# Patient Record
Sex: FEMALE | Race: WHITE | NOT HISPANIC OR LATINO | Employment: FULL TIME | URBAN - METROPOLITAN AREA
[De-identification: names, ages, dates, MRNs, and addresses within clinical notes are randomized per-mention and may not be internally consistent; named-entity substitution may affect disease eponyms.]

---

## 2023-08-15 ENCOUNTER — APPOINTMENT (OUTPATIENT)
Dept: RADIOLOGY | Facility: MEDICAL CENTER | Age: 83
End: 2023-08-15
Attending: EMERGENCY MEDICINE
Payer: MEDICARE

## 2023-08-15 ENCOUNTER — HOSPITAL ENCOUNTER (OUTPATIENT)
Facility: MEDICAL CENTER | Age: 83
End: 2023-09-02
Attending: EMERGENCY MEDICINE | Admitting: STUDENT IN AN ORGANIZED HEALTH CARE EDUCATION/TRAINING PROGRAM
Payer: MEDICARE

## 2023-08-15 DIAGNOSIS — E43 SEVERE PROTEIN-CALORIE MALNUTRITION (HCC): ICD-10-CM

## 2023-08-15 DIAGNOSIS — S09.90XA CLOSED HEAD INJURY, INITIAL ENCOUNTER: ICD-10-CM

## 2023-08-15 DIAGNOSIS — N30.00 ACUTE CYSTITIS WITHOUT HEMATURIA: ICD-10-CM

## 2023-08-15 DIAGNOSIS — R11.0 CHRONIC NAUSEA: ICD-10-CM

## 2023-08-15 DIAGNOSIS — I10 PRIMARY HYPERTENSION: ICD-10-CM

## 2023-08-15 DIAGNOSIS — F32.1 CURRENT MODERATE EPISODE OF MAJOR DEPRESSIVE DISORDER WITHOUT PRIOR EPISODE (HCC): ICD-10-CM

## 2023-08-15 DIAGNOSIS — I48.0 PAROXYSMAL ATRIAL FIBRILLATION (HCC): ICD-10-CM

## 2023-08-15 DIAGNOSIS — R62.7 FAILURE TO THRIVE IN ADULT: ICD-10-CM
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
## 2023-08-15 DIAGNOSIS — F32.2 CURRENT SEVERE EPISODE OF MAJOR DEPRESSIVE DISORDER WITHOUT PSYCHOTIC FEATURES WITHOUT PRIOR EPISODE (HCC): ICD-10-CM

## 2023-08-15 DIAGNOSIS — R55 NEAR SYNCOPE: ICD-10-CM

## 2023-08-15 PROBLEM — I16.0 HYPERTENSIVE URGENCY: Status: ACTIVE | Noted: 2023-08-15

## 2023-08-15 PROBLEM — F43.9 SITUATIONAL STRESS: Status: ACTIVE | Noted: 2023-08-15

## 2023-08-15 PROBLEM — G47.33 OSA (OBSTRUCTIVE SLEEP APNEA): Status: ACTIVE | Noted: 2023-08-15

## 2023-08-15 PROBLEM — R73.9 HYPERGLYCEMIA: Status: ACTIVE | Noted: 2023-08-15

## 2023-08-15 PROBLEM — I16.0 HYPERTENSIVE URGENCY: Status: RESOLVED | Noted: 2023-08-15 | Resolved: 2023-08-15

## 2023-08-15 LAB
ALBUMIN SERPL BCP-MCNC: 4.4 G/DL (ref 3.2–4.9)
ALBUMIN/GLOB SERPL: 1.7 G/DL
ALP SERPL-CCNC: 95 U/L (ref 30–99)
ALT SERPL-CCNC: 15 U/L (ref 2–50)
ANION GAP SERPL CALC-SCNC: 10 MMOL/L (ref 7–16)
AST SERPL-CCNC: 21 U/L (ref 12–45)
BASOPHILS # BLD AUTO: 0.3 % (ref 0–1.8)
BASOPHILS # BLD: 0.03 K/UL (ref 0–0.12)
BILIRUB SERPL-MCNC: 1 MG/DL (ref 0.1–1.5)
BUN SERPL-MCNC: 24 MG/DL (ref 8–22)
CALCIUM ALBUM COR SERPL-MCNC: 9.4 MG/DL (ref 8.5–10.5)
CALCIUM SERPL-MCNC: 9.7 MG/DL (ref 8.5–10.5)
CHLORIDE SERPL-SCNC: 99 MMOL/L (ref 96–112)
CO2 SERPL-SCNC: 28 MMOL/L (ref 20–33)
CREAT SERPL-MCNC: 1.04 MG/DL (ref 0.5–1.4)
EKG IMPRESSION: NORMAL
EOSINOPHIL # BLD AUTO: 0.16 K/UL (ref 0–0.51)
EOSINOPHIL NFR BLD: 1.6 % (ref 0–6.9)
ERYTHROCYTE [DISTWIDTH] IN BLOOD BY AUTOMATED COUNT: 48.4 FL (ref 35.9–50)
GFR SERPLBLD CREATININE-BSD FMLA CKD-EPI: 53 ML/MIN/1.73 M 2
GLOBULIN SER CALC-MCNC: 2.6 G/DL (ref 1.9–3.5)
GLUCOSE SERPL-MCNC: 127 MG/DL (ref 65–99)
HCT VFR BLD AUTO: 39.9 % (ref 37–47)
HGB BLD-MCNC: 12.8 G/DL (ref 12–16)
IMM GRANULOCYTES # BLD AUTO: 0.04 K/UL (ref 0–0.11)
IMM GRANULOCYTES NFR BLD AUTO: 0.4 % (ref 0–0.9)
LYMPHOCYTES # BLD AUTO: 1.52 K/UL (ref 1–4.8)
LYMPHOCYTES NFR BLD: 15.3 % (ref 22–41)
MCH RBC QN AUTO: 31 PG (ref 27–33)
MCHC RBC AUTO-ENTMCNC: 32.1 G/DL (ref 32.2–35.5)
MCV RBC AUTO: 96.6 FL (ref 81.4–97.8)
MONOCYTES # BLD AUTO: 0.65 K/UL (ref 0–0.85)
MONOCYTES NFR BLD AUTO: 6.5 % (ref 0–13.4)
NEUTROPHILS # BLD AUTO: 7.54 K/UL (ref 1.82–7.42)
NEUTROPHILS NFR BLD: 75.9 % (ref 44–72)
NRBC # BLD AUTO: 0 K/UL
NRBC BLD-RTO: 0 /100 WBC (ref 0–0.2)
PLATELET # BLD AUTO: 259 K/UL (ref 164–446)
PMV BLD AUTO: 9.4 FL (ref 9–12.9)
POTASSIUM SERPL-SCNC: 4.8 MMOL/L (ref 3.6–5.5)
PROT SERPL-MCNC: 7 G/DL (ref 6–8.2)
RBC # BLD AUTO: 4.13 M/UL (ref 4.2–5.4)
SODIUM SERPL-SCNC: 137 MMOL/L (ref 135–145)
TROPONIN T SERPL-MCNC: 13 NG/L (ref 6–19)
WBC # BLD AUTO: 9.9 K/UL (ref 4.8–10.8)

## 2023-08-15 PROCEDURE — 36415 COLL VENOUS BLD VENIPUNCTURE: CPT

## 2023-08-15 PROCEDURE — 99285 EMERGENCY DEPT VISIT HI MDM: CPT

## 2023-08-15 PROCEDURE — G0378 HOSPITAL OBSERVATION PER HR: HCPCS

## 2023-08-15 PROCEDURE — A9270 NON-COVERED ITEM OR SERVICE: HCPCS | Performed by: NURSE PRACTITIONER

## 2023-08-15 PROCEDURE — 700111 HCHG RX REV CODE 636 W/ 250 OVERRIDE (IP): Performed by: NURSE PRACTITIONER

## 2023-08-15 PROCEDURE — 80053 COMPREHEN METABOLIC PANEL: CPT

## 2023-08-15 PROCEDURE — 93005 ELECTROCARDIOGRAM TRACING: CPT | Performed by: EMERGENCY MEDICINE

## 2023-08-15 PROCEDURE — 70450 CT HEAD/BRAIN W/O DYE: CPT

## 2023-08-15 PROCEDURE — 96372 THER/PROPH/DIAG INJ SC/IM: CPT

## 2023-08-15 PROCEDURE — 99222 1ST HOSP IP/OBS MODERATE 55: CPT | Mod: AI | Performed by: NURSE PRACTITIONER

## 2023-08-15 PROCEDURE — 71045 X-RAY EXAM CHEST 1 VIEW: CPT

## 2023-08-15 PROCEDURE — 85025 COMPLETE CBC W/AUTO DIFF WBC: CPT

## 2023-08-15 PROCEDURE — 84484 ASSAY OF TROPONIN QUANT: CPT

## 2023-08-15 PROCEDURE — 700102 HCHG RX REV CODE 250 W/ 637 OVERRIDE(OP): Performed by: NURSE PRACTITIONER

## 2023-08-15 PROCEDURE — 94760 N-INVAS EAR/PLS OXIMETRY 1: CPT

## 2023-08-15 PROCEDURE — 72125 CT NECK SPINE W/O DYE: CPT

## 2023-08-15 RX ORDER — OMEPRAZOLE 20 MG/1
20 CAPSULE, DELAYED RELEASE ORAL 2 TIMES DAILY
Status: DISCONTINUED | OUTPATIENT
Start: 2023-08-15 | End: 2023-09-02 | Stop reason: HOSPADM

## 2023-08-15 RX ORDER — POLYETHYLENE GLYCOL 3350 17 G/17G
1 POWDER, FOR SOLUTION ORAL
Status: DISCONTINUED | OUTPATIENT
Start: 2023-08-15 | End: 2023-08-19

## 2023-08-15 RX ORDER — FLUTICASONE PROPIONATE 50 MCG
1 SPRAY, SUSPENSION (ML) NASAL
Status: DISCONTINUED | OUTPATIENT
Start: 2023-08-15 | End: 2023-08-15

## 2023-08-15 RX ORDER — HYDRALAZINE HYDROCHLORIDE 20 MG/ML
10 INJECTION INTRAMUSCULAR; INTRAVENOUS EVERY 4 HOURS PRN
Status: DISCONTINUED | OUTPATIENT
Start: 2023-08-15 | End: 2023-08-26

## 2023-08-15 RX ORDER — LORAZEPAM 0.5 MG/1
0.5 TABLET ORAL 2 TIMES DAILY PRN
Status: DISCONTINUED | OUTPATIENT
Start: 2023-08-15 | End: 2023-08-26

## 2023-08-15 RX ORDER — VITAMIN B COMPLEX
1000 TABLET ORAL DAILY
Status: DISCONTINUED | OUTPATIENT
Start: 2023-08-16 | End: 2023-09-02 | Stop reason: HOSPADM

## 2023-08-15 RX ORDER — ONDANSETRON 4 MG/1
4 TABLET, ORALLY DISINTEGRATING ORAL EVERY 8 HOURS PRN
COMMUNITY

## 2023-08-15 RX ORDER — ONDANSETRON 4 MG/1
4 TABLET, ORALLY DISINTEGRATING ORAL EVERY 6 HOURS PRN
Status: DISCONTINUED | OUTPATIENT
Start: 2023-08-15 | End: 2023-09-02 | Stop reason: HOSPADM

## 2023-08-15 RX ORDER — ONDANSETRON 2 MG/ML
4 INJECTION INTRAMUSCULAR; INTRAVENOUS EVERY 6 HOURS PRN
Status: DISCONTINUED | OUTPATIENT
Start: 2023-08-15 | End: 2023-09-02 | Stop reason: HOSPADM

## 2023-08-15 RX ORDER — LOSARTAN POTASSIUM 100 MG/1
100 TABLET ORAL DAILY
Status: ON HOLD | COMMUNITY
End: 2023-08-28

## 2023-08-15 RX ORDER — HEPARIN SODIUM 5000 [USP'U]/ML
5000 INJECTION, SOLUTION INTRAVENOUS; SUBCUTANEOUS EVERY 8 HOURS
Status: DISCONTINUED | OUTPATIENT
Start: 2023-08-15 | End: 2023-08-17

## 2023-08-15 RX ORDER — LEVOTHYROXINE SODIUM 0.1 MG/1
100 TABLET ORAL
COMMUNITY

## 2023-08-15 RX ORDER — DOCUSATE SODIUM 100 MG/1
100 CAPSULE, LIQUID FILLED ORAL
Status: ON HOLD | COMMUNITY
End: 2023-08-28

## 2023-08-15 RX ORDER — PANTOPRAZOLE SODIUM 40 MG/1
40 TABLET, DELAYED RELEASE ORAL 2 TIMES DAILY
COMMUNITY

## 2023-08-15 RX ORDER — DABIGATRAN ETEXILATE MESYLATE 110 MG/1
110 CAPSULE ORAL 2 TIMES DAILY
COMMUNITY

## 2023-08-15 RX ORDER — BISACODYL 10 MG
10 SUPPOSITORY, RECTAL RECTAL
Status: DISCONTINUED | OUTPATIENT
Start: 2023-08-15 | End: 2023-09-02 | Stop reason: HOSPADM

## 2023-08-15 RX ORDER — AMOXICILLIN 250 MG
2 CAPSULE ORAL 2 TIMES DAILY PRN
Status: DISCONTINUED | OUTPATIENT
Start: 2023-08-15 | End: 2023-08-19

## 2023-08-15 RX ORDER — FLUTICASONE PROPIONATE 50 MCG
1 SPRAY, SUSPENSION (ML) NASAL
COMMUNITY

## 2023-08-15 RX ORDER — LEVOTHYROXINE SODIUM 0.1 MG/1
100 TABLET ORAL
Status: DISCONTINUED | OUTPATIENT
Start: 2023-08-16 | End: 2023-09-02 | Stop reason: HOSPADM

## 2023-08-15 RX ADMIN — HEPARIN SODIUM 5000 UNITS: 5000 INJECTION, SOLUTION INTRAVENOUS; SUBCUTANEOUS at 21:33

## 2023-08-15 RX ADMIN — OMEPRAZOLE 20 MG: 20 CAPSULE, DELAYED RELEASE ORAL at 21:33

## 2023-08-15 ASSESSMENT — COGNITIVE AND FUNCTIONAL STATUS - GENERAL
TURNING FROM BACK TO SIDE WHILE IN FLAT BAD: A LITTLE
MOVING TO AND FROM BED TO CHAIR: A LITTLE
STANDING UP FROM CHAIR USING ARMS: A LITTLE
TOILETING: A LITTLE
SUGGESTED CMS G CODE MODIFIER DAILY ACTIVITY: CJ
SUGGESTED CMS G CODE MODIFIER MOBILITY: CK
DRESSING REGULAR LOWER BODY CLOTHING: A LITTLE
DAILY ACTIVITIY SCORE: 20
CLIMB 3 TO 5 STEPS WITH RAILING: A LOT
WALKING IN HOSPITAL ROOM: A LITTLE
HELP NEEDED FOR BATHING: A LITTLE
MOVING FROM LYING ON BACK TO SITTING ON SIDE OF FLAT BED: A LOT
MOBILITY SCORE: 16
DRESSING REGULAR UPPER BODY CLOTHING: A LITTLE

## 2023-08-15 ASSESSMENT — LIFESTYLE VARIABLES
HAVE YOU EVER FELT YOU SHOULD CUT DOWN ON YOUR DRINKING: NO
EVER FELT BAD OR GUILTY ABOUT YOUR DRINKING: NO
ON A TYPICAL DAY WHEN YOU DRINK ALCOHOL HOW MANY DRINKS DO YOU HAVE: 0
TOTAL SCORE: 0
ALCOHOL_USE: NO
EVER HAD A DRINK FIRST THING IN THE MORNING TO STEADY YOUR NERVES TO GET RID OF A HANGOVER: NO
CONSUMPTION TOTAL: NEGATIVE
AVERAGE NUMBER OF DAYS PER WEEK YOU HAVE A DRINK CONTAINING ALCOHOL: 0
TOTAL SCORE: 0
TOTAL SCORE: 0
HOW MANY TIMES IN THE PAST YEAR HAVE YOU HAD 5 OR MORE DRINKS IN A DAY: 0
HAVE PEOPLE ANNOYED YOU BY CRITICIZING YOUR DRINKING: NO

## 2023-08-15 ASSESSMENT — ENCOUNTER SYMPTOMS
DIAPHORESIS: 0
SORE THROAT: 0
ORTHOPNEA: 0
FALLS: 1
BLOOD IN STOOL: 0
WEAKNESS: 0
HEADACHES: 0
FLANK PAIN: 0
SHORTNESS OF BREATH: 0
FEVER: 0
MYALGIAS: 0
HEARTBURN: 1
VOMITING: 0
WEIGHT LOSS: 1
SPUTUM PRODUCTION: 0
NAUSEA: 1
PALPITATIONS: 0
NECK PAIN: 0
EYES NEGATIVE: 1
COUGH: 0
DIZZINESS: 1
CONSTIPATION: 1
WHEEZING: 0
ABDOMINAL PAIN: 0
CHILLS: 0
NERVOUS/ANXIOUS: 1
SINUS PAIN: 0
BACK PAIN: 0
DEPRESSION: 1

## 2023-08-15 ASSESSMENT — PATIENT HEALTH QUESTIONNAIRE - PHQ9
4. FEELING TIRED OR HAVING LITTLE ENERGY: NEARLY EVERY DAY
5. POOR APPETITE OR OVEREATING: NEARLY EVERY DAY
SUM OF ALL RESPONSES TO PHQ9 QUESTIONS 1 AND 2: 6
9. THOUGHTS THAT YOU WOULD BE BETTER OFF DEAD, OR OF HURTING YOURSELF: NOT AT ALL
SUM OF ALL RESPONSES TO PHQ QUESTIONS 1-9: 19
3. TROUBLE FALLING OR STAYING ASLEEP OR SLEEPING TOO MUCH: NEARLY EVERY DAY
6. FEELING BAD ABOUT YOURSELF - OR THAT YOU ARE A FAILURE OR HAVE LET YOURSELF OR YOUR FAMILY DOWN: SEVERAL DAYS
2. FEELING DOWN, DEPRESSED, IRRITABLE, OR HOPELESS: NEARLY EVERY DAY
1. LITTLE INTEREST OR PLEASURE IN DOING THINGS: NEARLY EVERY DAY
7. TROUBLE CONCENTRATING ON THINGS, SUCH AS READING THE NEWSPAPER OR WATCHING TELEVISION: NEARLY EVERY DAY
8. MOVING OR SPEAKING SO SLOWLY THAT OTHER PEOPLE COULD HAVE NOTICED. OR THE OPPOSITE, BEING SO FIGETY OR RESTLESS THAT YOU HAVE BEEN MOVING AROUND A LOT MORE THAN USUAL: NOT AT ALL

## 2023-08-15 ASSESSMENT — FIBROSIS 4 INDEX: FIB4 SCORE: 1.76

## 2023-08-15 ASSESSMENT — PAIN DESCRIPTION - PAIN TYPE: TYPE: ACUTE PAIN

## 2023-08-15 NOTE — ED NOTES
Med Rec complete per patient and med list on phone   Allergies reviewed  No oral antibiotics in the last 30 days  Preferred pharmacy: [facility] on [address]    Pt states she took a Supplement yesterday evening that helps digest food, pt unable to verify name

## 2023-08-15 NOTE — ED NOTES
Pt reports losing ~30 lbs in the last 6 months. Pt also reports having frequent dizzy spells over the last few months.

## 2023-08-15 NOTE — ED TRIAGE NOTES
Pt bib ems c/o episode of nausea and dizziness that caused her to fall down hit her head. -loc +blood thinners. Pt states she has been having these episodes of nausea and dizziness for months. Pt states she take zofran daily for this. Nad. No obvious trauma. Denies pain. Report off to Tom SINGLETON

## 2023-08-15 NOTE — ED PROVIDER NOTES
ER Provider Note    Scribed for Jelani Oakes D.O. by Elisabet Dick. 8/15/2023  1:08 PM    Primary Care Provider: No primary care provider noted.    CHIEF COMPLAINT  Chief Complaint   Patient presents with    Fall     HPI/ROS  LIMITATION TO HISTORY   None    OUTSIDE HISTORIAN(S)  EMS who gave report    Felipa Chavez is a 82 y.o. female who presents to the Emergency Department as a code TBI. The patient was standing at the [facility] when she suddenly felt nauseous and dizzy causing her to fall on her face. No loss of consciousness and she remembers the incident. This is her third near syncopal episode in the last three months and this is the first time she is getting evaluated at the hospital. She did not lose consciousness in the previous two episodes and her family has been able to catch her before she falls to the ground. She is not on any blood thinners.     ROS as per HPI.    PAST MEDICAL HISTORY  Past Medical History:   Diagnosis Date    Chronic low back pain 11/10/2010    Esophageal stricture 11/10/2010    GERD (gastroesophageal reflux disease) 11/10/2010    HTN (hypertension) 11/10/2010    Hypothyroidism 11/10/2010       SURGICAL HISTORY  Past Surgical History:   Procedure Laterality Date    ABDOMINAL HYSTERECTOMY TOTAL      APPENDECTOMY      CHOLECYSTECTOMY      KNEE ARTHROSCOPY      LAMINOTOMY      ROTATOR CUFF REPAIR         FAMILY HISTORY  Family History   Problem Relation Age of Onset    Cancer Mother         breast    Heart Disease Father 61        mi       SOCIAL HISTORY   reports that she has never smoked. She has never used smokeless tobacco. She reports that she does not drink alcohol and does not use drugs.    CURRENT MEDICATIONS  Previous Medications    LEVOTHYROXINE (SYNTHROID) 75 MCG TABS    TAKE ONE TABLET BY MOUTH EVERY DAY    LISINOPRIL (PRINIVIL) 20 MG TABS    Take 1 Tab by mouth every day. Needs visit before next refill.    OMEPRAZOLE (PRILOSEC) 20 MG CPDR    Take 20 mg by  "mouth 2 times a day.    ONDANSETRON (ZOFRAN ODT) 4 MG TABLET DISPERSIBLE    Take 4 mg by mouth every 6 hours as needed for Nausea/Vomiting.    PROBIOTIC PRODUCT (ALIGN PO)    Take  by mouth every day.       ALLERGIES  Codeine and Pcn [penicillins]    PHYSICAL EXAM  BP (!) 178/74   Pulse 72   Temp 36.7 °C (98 °F) (Temporal)   Resp 17   Ht 1.499 m (4' 11\")   Wt 47.6 kg (105 lb)   SpO2 91%   BMI 21.21 kg/m²     General: No acute distress.  HENT: Normocephalic, Mucus membranes are moist.   Chest: Lungs have even and unlabored respirations, Clear to auscultation.   Cardiovascular: Regular rate and regular rhythm, No peripheral cyanosis.  Abdomen: Non distended.  Neuro: Awake, Conversive, Able to relay recent events.  Psychiatric: Calm and cooperative.     EXTERNAL RECORDS REVIEWED  Review of patient's past medical records show patient has a history of hypertension.     INITIAL ASSESSMENT  Patient presents as a code TBI and was seen stat at the charge desk. History was obtained by paramedics. Patient has a GCS of 15 and is oriented. There are no areas where she is complaining of pain. She will be sent stat to CT to scan her head and cervical spine to evaluate for bleed and fracture due to the mechanism of injury. She has had two episodes of near syncope like this so cardiac evaluation will be done.     ED Observation Status? No; Patient does not meet criteria for ED Observation.     DIAGNOSTIC STUDIES    Labs:   Results for orders placed or performed during the hospital encounter of 08/15/23   CBC WITH DIFFERENTIAL   Result Value Ref Range    WBC 9.9 4.8 - 10.8 K/uL    RBC 4.13 (L) 4.20 - 5.40 M/uL    Hemoglobin 12.8 12.0 - 16.0 g/dL    Hematocrit 39.9 37.0 - 47.0 %    MCV 96.6 81.4 - 97.8 fL    MCH 31.0 27.0 - 33.0 pg    MCHC 32.1 (L) 32.2 - 35.5 g/dL    RDW 48.4 35.9 - 50.0 fL    Platelet Count 259 164 - 446 K/uL    MPV 9.4 9.0 - 12.9 fL    Neutrophils-Polys 75.90 (H) 44.00 - 72.00 %    Lymphocytes 15.30 (L) " 22.00 - 41.00 %    Monocytes 6.50 0.00 - 13.40 %    Eosinophils 1.60 0.00 - 6.90 %    Basophils 0.30 0.00 - 1.80 %    Immature Granulocytes 0.40 0.00 - 0.90 %    Nucleated RBC 0.00 0.00 - 0.20 /100 WBC    Neutrophils (Absolute) 7.54 (H) 1.82 - 7.42 K/uL    Lymphs (Absolute) 1.52 1.00 - 4.80 K/uL    Monos (Absolute) 0.65 0.00 - 0.85 K/uL    Eos (Absolute) 0.16 0.00 - 0.51 K/uL    Baso (Absolute) 0.03 0.00 - 0.12 K/uL    Immature Granulocytes (abs) 0.04 0.00 - 0.11 K/uL    NRBC (Absolute) 0.00 K/uL   COMP METABOLIC PANEL   Result Value Ref Range    Sodium 137 135 - 145 mmol/L    Potassium 4.8 3.6 - 5.5 mmol/L    Chloride 99 96 - 112 mmol/L    Co2 28 20 - 33 mmol/L    Anion Gap 10.0 7.0 - 16.0    Glucose 127 (H) 65 - 99 mg/dL    Bun 24 (H) 8 - 22 mg/dL    Creatinine 1.04 0.50 - 1.40 mg/dL    Calcium 9.7 8.5 - 10.5 mg/dL    Correct Calcium 9.4 8.5 - 10.5 mg/dL    AST(SGOT) 21 12 - 45 U/L    ALT(SGPT) 15 2 - 50 U/L    Alkaline Phosphatase 95 30 - 99 U/L    Total Bilirubin 1.0 0.1 - 1.5 mg/dL    Albumin 4.4 3.2 - 4.9 g/dL    Total Protein 7.0 6.0 - 8.2 g/dL    Globulin 2.6 1.9 - 3.5 g/dL    A-G Ratio 1.7 g/dL   TROPONIN   Result Value Ref Range    Troponin T 13 6 - 19 ng/L   ESTIMATED GFR   Result Value Ref Range    GFR (CKD-EPI) 53 (A) >60 mL/min/1.73 m 2   EKG (NOW)   Result Value Ref Range    Report       [facility] Emergency Dept.    Test Date:  2023-08-15  Pt Name:    TEREZA CLAIRE                   Department: ER  MRN:        [id number]                      Room:       BL 22  Gender:     Female                       Technician: [id number]  :        [id number]                   Requested By:MICHELE LEAVITT  Order #:    [id number]                    Reading MD: MICHELE LEAVITT D.O.    Measurements  Intervals                                Axis  Rate:       67                           P:          52  CA:         201                          QRS:        -16  QRSD:       96                           T:           41  QT:         377  QTc:        398    Interpretive Statements  Sinus rhythm  Borderline left axis deviation  No previous ECG available for comparison  Electronically Signed On 08- 15:51:51 PDT by MICHELE LEAVITT D.O.        All labs reviewed by me.      EKG:   I have independently interpreted the above EKG.    Radiology:   The attending emergency physician has independently interpreted the diagnostic imaging associated with this visit and am waiting the final reading from the radiologist.   Preliminary interpretation is as follows: CT-Head shows no bleed.   Radiologist interpretation:   DX-CHEST-PORTABLE (1 VIEW)   Final Result      No acute cardiopulmonary abnormality.      CT-CSPINE WITHOUT PLUS RECONS   Final Result      No acute fracture or dislocation of the cervical spine.      Multilevel disc and facet degeneration.      CT-HEAD W/O   Final Result      1.  Chronic microvascular ischemic type changes.   2.  No acute intracranial abnormality.   3.  Right sphenoid sinusitis         EC-ECHOCARDIOGRAM COMPLETE W/O CONT    (Results Pending)      COURSE & MEDICAL DECISION MAKING     COURSE AND PLAN  1:08 PM - Patient seen and examined at the charge desk as a Code TBI. Discussed plan of care, including imaging and labs. Patient agrees to the plan of care. Ordered for EKG, CT-Head without contrast, CT-C Spine without contrast, DX-Chest, Troponin, CMP, Estimated GFR, and CBC w/ Diff to evaluate her symptoms.     3:40 PM - Patient was reevaluated at bedside. Discussed lab and radiology results with the patient. The patient had the opportunity to ask any questions. The plan for hospitalization was discussed with the patient given their current presentation and diagnostic study results. Patient is understanding and agreeable to the plan for hospitalization.     4:24 PM - I discussed the patient's case and the above findings with Dr. Campbell (Hospitalist) who agrees to evaluate the patient for  hospitalization.    ED Summary: Patient presented as code TBI.  She had a syncope versus near syncopal episode.  The patient had head and C-spine which was negative.  A cardiac evaluation was done to evaluate for her syncopal episodes.  In discussing this with patient and the daughter further, she been having increased episodes of this.  Happens at rest, happens with mild exertion, not specifically from orthostatic changes.  With this I see there is no cardiac evaluation having done in the chart.  The patient will be admitted for further evaluation for syncope versus near syncope.    CRITICAL CARE  The very real possibilty of a deterioration of this patient's condition required the highest level of my preparedness for sudden, emergent intervention. I provided critical care services, which included medication orders, frequent reevaluations of the patient's condition and response to treatment, ordering and reviewing test results, and discussing the case with various consultants. The critical care time associated with the care of the patient was 35 minutes. Review chart for interventions. This time is exclusive of any other billable procedures.      DISPOSITION AND DISCUSSIONS  I have discussed management of the patient with the following physicians and SHAYLA's: Dr. Campbell (Hospitalist).     Discussion of management with other [facility] or appropriate source(s): None.     Barriers to care at this time, including but not limited to: None.     The patient will return for new or worsening symptoms and is stable at the time of discharge.    The patient is referred to a primary physician for blood pressure management, diabetic screening, and for all other preventative health concerns.    DISPOSITION:  Patient will be hospitalized by Dr. Campbell in guarded condition.     FINAL DIAGNOSIS  1. Fall, initial encounter    2. Closed head injury, initial encounter    3. Near syncope      Total Critical Care Time was 35 minutes, as outlined  above.     Elisabet VILLA (Scribe), am scribing for, and in the presence of, Jelani Oakes D.O..    Electronically signed by: Elisabet Dick (Garretibjanice), 8/15/2023    Jelani VILLA D.O. personally performed the services described in this documentation, as scribed by Elisabet Dick in my presence, and it is both accurate and complete.     The note accurately reflects work and decisions made by me.  Jelani Oakes D.O.  8/15/2023  7:12 PM

## 2023-08-16 ENCOUNTER — APPOINTMENT (OUTPATIENT)
Dept: CARDIOLOGY | Facility: MEDICAL CENTER | Age: 83
End: 2023-08-16
Attending: NURSE PRACTITIONER
Payer: MEDICARE

## 2023-08-16 LAB
ANION GAP SERPL CALC-SCNC: 9 MMOL/L (ref 7–16)
BASOPHILS # BLD AUTO: 0.5 % (ref 0–1.8)
BASOPHILS # BLD: 0.04 K/UL (ref 0–0.12)
BUN SERPL-MCNC: 27 MG/DL (ref 8–22)
CALCIUM SERPL-MCNC: 8.8 MG/DL (ref 8.5–10.5)
CHLORIDE SERPL-SCNC: 100 MMOL/L (ref 96–112)
CO2 SERPL-SCNC: 27 MMOL/L (ref 20–33)
CREAT SERPL-MCNC: 1.11 MG/DL (ref 0.5–1.4)
EOSINOPHIL # BLD AUTO: 0.18 K/UL (ref 0–0.51)
EOSINOPHIL NFR BLD: 2.1 % (ref 0–6.9)
ERYTHROCYTE [DISTWIDTH] IN BLOOD BY AUTOMATED COUNT: 48.7 FL (ref 35.9–50)
EST. AVERAGE GLUCOSE BLD GHB EST-MCNC: 123 MG/DL
GFR SERPLBLD CREATININE-BSD FMLA CKD-EPI: 49 ML/MIN/1.73 M 2
GLUCOSE SERPL-MCNC: 97 MG/DL (ref 65–99)
HBA1C MFR BLD: 5.9 % (ref 4–5.6)
HCT VFR BLD AUTO: 37.6 % (ref 37–47)
HGB BLD-MCNC: 12 G/DL (ref 12–16)
IMM GRANULOCYTES # BLD AUTO: 0.01 K/UL (ref 0–0.11)
IMM GRANULOCYTES NFR BLD AUTO: 0.1 % (ref 0–0.9)
LV EJECT FRACT  99904: 65
LV EJECT FRACT MOD 2C 99903: 62.52
LV EJECT FRACT MOD 4C 99902: 66.72
LV EJECT FRACT MOD BP 99901: 64.93
LYMPHOCYTES # BLD AUTO: 3.05 K/UL (ref 1–4.8)
LYMPHOCYTES NFR BLD: 35.1 % (ref 22–41)
MAGNESIUM SERPL-MCNC: 2 MG/DL (ref 1.5–2.5)
MCH RBC QN AUTO: 30.8 PG (ref 27–33)
MCHC RBC AUTO-ENTMCNC: 31.9 G/DL (ref 32.2–35.5)
MCV RBC AUTO: 96.7 FL (ref 81.4–97.8)
MONOCYTES # BLD AUTO: 1.17 K/UL (ref 0–0.85)
MONOCYTES NFR BLD AUTO: 13.5 % (ref 0–13.4)
NEUTROPHILS # BLD AUTO: 4.24 K/UL (ref 1.82–7.42)
NEUTROPHILS NFR BLD: 48.7 % (ref 44–72)
NRBC # BLD AUTO: 0 K/UL
NRBC BLD-RTO: 0 /100 WBC (ref 0–0.2)
PLATELET # BLD AUTO: 239 K/UL (ref 164–446)
PMV BLD AUTO: 9.6 FL (ref 9–12.9)
POTASSIUM SERPL-SCNC: 4.8 MMOL/L (ref 3.6–5.5)
RBC # BLD AUTO: 3.89 M/UL (ref 4.2–5.4)
SODIUM SERPL-SCNC: 136 MMOL/L (ref 135–145)
TSH SERPL DL<=0.005 MIU/L-ACNC: 1.07 UIU/ML (ref 0.38–5.33)
VIT B12 SERPL-MCNC: 1038 PG/ML (ref 211–911)
WBC # BLD AUTO: 8.7 K/UL (ref 4.8–10.8)

## 2023-08-16 PROCEDURE — G0378 HOSPITAL OBSERVATION PER HR: HCPCS

## 2023-08-16 PROCEDURE — 83036 HEMOGLOBIN GLYCOSYLATED A1C: CPT

## 2023-08-16 PROCEDURE — A9270 NON-COVERED ITEM OR SERVICE: HCPCS | Performed by: NURSE PRACTITIONER

## 2023-08-16 PROCEDURE — 36415 COLL VENOUS BLD VENIPUNCTURE: CPT

## 2023-08-16 PROCEDURE — 93306 TTE W/DOPPLER COMPLETE: CPT | Mod: 26 | Performed by: INTERNAL MEDICINE

## 2023-08-16 PROCEDURE — A9270 NON-COVERED ITEM OR SERVICE: HCPCS | Performed by: INTERNAL MEDICINE

## 2023-08-16 PROCEDURE — 96372 THER/PROPH/DIAG INJ SC/IM: CPT

## 2023-08-16 PROCEDURE — 83735 ASSAY OF MAGNESIUM: CPT

## 2023-08-16 PROCEDURE — 82652 VIT D 1 25-DIHYDROXY: CPT

## 2023-08-16 PROCEDURE — 700102 HCHG RX REV CODE 250 W/ 637 OVERRIDE(OP): Performed by: INTERNAL MEDICINE

## 2023-08-16 PROCEDURE — 700111 HCHG RX REV CODE 636 W/ 250 OVERRIDE (IP): Performed by: NURSE PRACTITIONER

## 2023-08-16 PROCEDURE — 82607 VITAMIN B-12: CPT

## 2023-08-16 PROCEDURE — 700102 HCHG RX REV CODE 250 W/ 637 OVERRIDE(OP): Performed by: NURSE PRACTITIONER

## 2023-08-16 PROCEDURE — 80048 BASIC METABOLIC PNL TOTAL CA: CPT

## 2023-08-16 PROCEDURE — 99232 SBSQ HOSP IP/OBS MODERATE 35: CPT | Performed by: INTERNAL MEDICINE

## 2023-08-16 PROCEDURE — 84443 ASSAY THYROID STIM HORMONE: CPT

## 2023-08-16 PROCEDURE — 85025 COMPLETE CBC W/AUTO DIFF WBC: CPT

## 2023-08-16 PROCEDURE — 93306 TTE W/DOPPLER COMPLETE: CPT

## 2023-08-16 RX ORDER — AMLODIPINE BESYLATE 5 MG/1
2.5 TABLET ORAL
Status: DISCONTINUED | OUTPATIENT
Start: 2023-08-16 | End: 2023-08-17

## 2023-08-16 RX ORDER — PROCHLORPERAZINE EDISYLATE 5 MG/ML
10 INJECTION INTRAMUSCULAR; INTRAVENOUS EVERY 6 HOURS PRN
Status: DISCONTINUED | OUTPATIENT
Start: 2023-08-16 | End: 2023-08-26

## 2023-08-16 RX ORDER — ACETAMINOPHEN 325 MG/1
650 TABLET ORAL EVERY 6 HOURS PRN
Status: DISCONTINUED | OUTPATIENT
Start: 2023-08-16 | End: 2023-08-26

## 2023-08-16 RX ADMIN — OMEPRAZOLE 20 MG: 20 CAPSULE, DELAYED RELEASE ORAL at 05:26

## 2023-08-16 RX ADMIN — HEPARIN SODIUM 5000 UNITS: 5000 INJECTION, SOLUTION INTRAVENOUS; SUBCUTANEOUS at 22:00

## 2023-08-16 RX ADMIN — AMLODIPINE BESYLATE 2.5 MG: 5 TABLET ORAL at 15:00

## 2023-08-16 RX ADMIN — ONDANSETRON 4 MG: 4 TABLET, ORALLY DISINTEGRATING ORAL at 05:24

## 2023-08-16 RX ADMIN — LEVOTHYROXINE SODIUM 100 MCG: 0.1 TABLET ORAL at 05:27

## 2023-08-16 RX ADMIN — HEPARIN SODIUM 5000 UNITS: 5000 INJECTION, SOLUTION INTRAVENOUS; SUBCUTANEOUS at 15:00

## 2023-08-16 RX ADMIN — ACETAMINOPHEN 650 MG: 325 TABLET, FILM COATED ORAL at 23:42

## 2023-08-16 RX ADMIN — HEPARIN SODIUM 5000 UNITS: 5000 INJECTION, SOLUTION INTRAVENOUS; SUBCUTANEOUS at 05:25

## 2023-08-16 RX ADMIN — OMEPRAZOLE 20 MG: 20 CAPSULE, DELAYED RELEASE ORAL at 17:08

## 2023-08-16 RX ADMIN — ACETAMINOPHEN 650 MG: 325 TABLET, FILM COATED ORAL at 17:08

## 2023-08-16 RX ADMIN — SENNOSIDES AND DOCUSATE SODIUM 2 TABLET: 50; 8.6 TABLET ORAL at 05:26

## 2023-08-16 RX ADMIN — Medication 1000 UNITS: at 05:26

## 2023-08-16 ASSESSMENT — ENCOUNTER SYMPTOMS
SINUS PAIN: 0
SHORTNESS OF BREATH: 0
NECK PAIN: 0
EYES NEGATIVE: 1
HEADACHES: 0
SORE THROAT: 0
VOMITING: 0
WEAKNESS: 0
WHEEZING: 0
WEIGHT LOSS: 1
FEVER: 0
DEPRESSION: 1
DIAPHORESIS: 0
HEARTBURN: 1
PALPITATIONS: 0
NERVOUS/ANXIOUS: 1
NAUSEA: 1
MYALGIAS: 0
FLANK PAIN: 0
BACK PAIN: 0
BLOOD IN STOOL: 0
COUGH: 0
ORTHOPNEA: 0
CONSTIPATION: 1
DIZZINESS: 1
FALLS: 1
SPUTUM PRODUCTION: 0
ABDOMINAL PAIN: 0
CHILLS: 0

## 2023-08-16 ASSESSMENT — PAIN DESCRIPTION - PAIN TYPE: TYPE: ACUTE PAIN

## 2023-08-16 ASSESSMENT — FIBROSIS 4 INDEX: FIB4 SCORE: 1.91

## 2023-08-16 NOTE — ASSESSMENT & PLAN NOTE
Per patient report and security footage at bank she did not lose consciousness  Recurrent near-syncope events precipitated by nausea  Likely vasovagal due to chronic constipation and nausea  CTH negative for acute process  TTE without significant valvular disease  EKG and telemetry NSR without Afib RVR

## 2023-08-16 NOTE — DIETARY
"Nutrition services: Day 0 of admit.  Felipa Chavez is a 82 y.o. female with admitting DX of syncope and collapse    Consult received for MST 4 on nutrition screening. RD attempted to meet with pt at bedside but pt was on the phone. RD communicated with RN. Per RN, pt has been eating okay and no nutrition concerns that RN is aware of.     Assessment:  Height: 149.9 cm (4' 11\")  Weight: 45.8 kg (100 lb 15.5 oz)- stand up scale  Body mass index is 20.39 kg/m²., BMI classification: normal weight  Diet/Intake: Cardiac Diet. PO intake >50% x2 meals per ADL's    Evaluation:   Pt admitted for syncope and collapse with stress, hyperglycemia, JEREMIAH, chronic nausea, HTN, GERD, hypothyroidism  Based on weight history in chart, no other weight history to review in the past 1 year.   Labs: Bun 27, GFR 49, A1C 5.9  Meds: Synthroid, Ativan, omeprazole, Zofran, Compazine, bowel regimen, vitamin D3  Skin: No staged wounds. 1+ edema RLE and LLE  GI: Last BM PTA    Malnutrition Risk: Unable to fully assess at this time,     Recommendations/Plan:  Continue current PO diet   Encourage intake of meals  Document intake of all meals as % taken in ADL's to provide interdisciplinary communication across all shifts.   Monitor weight.  Nutrition rep will continue to see patient for ongoing meal and snack preferences.     RD following.     "

## 2023-08-16 NOTE — CARE PLAN
The patient is Stable - Low risk of patient condition declining or worsening    Shift Goals  Clinical Goals: tele monitor, promote sleep, fall prevention  Patient Goals: Sleep    Progress made toward(s) clinical / shift goals:  ortho Bps      Problem: Knowledge Deficit - Standard  Goal: Patient and family/care givers will demonstrate understanding of plan of care, disease process/condition, diagnostic tests and medications  Outcome: Progressing     Problem: Fall Risk  Goal: Patient will remain free from falls  Outcome: Progressing     Problem: Depression  Goal: Patient and family/caregiver will verbalize accurate information about at least two of the possible causes of depression, three-four of the signs and symptoms of depression  Outcome: Progressing       Patient is not progressing towards the following goals:

## 2023-08-16 NOTE — CARE PLAN
The patient is Stable - Low risk of patient condition declining or worsening    Shift Goals  Clinical Goals: tele monitor, promote sleep, fall prevention  Patient Goals: Sleep    Progress made toward(s) clinical / shift goals:    Problem: Knowledge Deficit - Standard  Goal: Patient and family/care givers will demonstrate understanding of plan of care, disease process/condition, diagnostic tests and medications  Outcome: Progressing     Problem: Fall Risk  Goal: Patient will remain free from falls  Outcome: Progressing     Problem: Depression  Goal: Patient and family/caregiver will verbalize accurate information about at least two of the possible causes of depression, three-four of the signs and symptoms of depression  Outcome: Progressing       Patient is not progressing towards the following goals:

## 2023-08-16 NOTE — DISCHARGE PLANNING
In the case of an emergency, pt's legal NOK are her 6 children, but she provided the information for her granddaughter Airam [phone].     RN NAVI met with Felipa at bedside and obtained the information used in this assessment. She currently lives in a home with her granddaughter, but plans to return to [facility] soon. She sold her home approx 4 years ago and was moving around with her . She was living with her sister for close to 3 years in [place], then spent 3-4 months with a grandson in [place] where her  passed away, then a few months back in [place], prior to coming here to live with her granddaughter about a month ago. Prior to current hospitalization, pt was independent with ADLS/IADLS. Pt drives and is able to attend necessary MD appointments. She does not have a PCP given her frequent moving. She plans to return to [place] after this admission. She has a place to stay in [place] and her friend Erik from [place] will drive with her in her camper back to [facility]. Felipa denies any needs/questions at this time.      Care Transition Team Assessment    Information Source  Orientation Level: Oriented X4  Information Given By: Patient  Who is responsible for making decisions for patient? : Patient    Readmission Evaluation  Is this a readmission?: No    Elopement Risk  Legal Hold: No  Ambulatory or Self Mobile in Wheelchair: Yes  Disoriented: No  Psychiatric Symptoms: None  History of Wandering: No  Elopement this Admit: No  Vocalizing Wanting to Leave: No  Displays Behaviors, Body Language Wanting to Leave: No-Not at Risk for Elopement  Elopement Risk: Not at Risk for Elopement    Interdisciplinary Discharge Planning  Does Admitting Nurse Feel This Could be a Complex Discharge?: No  Primary Care Physician: n/a  Lives with - Patient's Self Care Capacity: Related Adult (grand daughter, Airam)  Patient or legal guardian wants to designate a caregiver: No  Support Systems: Children, Family Member(s)  Housing /  Facility: [address] [place] [address]  Do You Take your Prescribed Medications Regularly: Yes  Able to Return to Previous ADL's: Yes  Mobility Issues: No  Prior Services: None  Patient Prefers to be Discharged to:: home  Assistance Needed: No  Durable Medical Equipment: Not Applicable    Discharge Preparedness  What is your plan after discharge?: Home with help  What are your discharge supports?: Child  Prior Functional Level: Ambulatory, Drives Self, Independent with Activities of Daily Living, Independent with Medication Management    Functional Assesment  Prior Functional Level: Ambulatory, Drives Self, Independent with Activities of Daily Living, Independent with Medication Management    Finances  Financial Barriers to Discharge: No  Prescription Coverage: Yes    Vision / Hearing Impairment  Vision Impairment : Yes  Right Eye Vision: Impaired, Wears Glasses  Left Eye Vision: Impaired, Wears Glasses  Hearing Impairment : No    Values / Beliefs / Concerns  Values / Beliefs Concerns : No    Advance Directive  Advance Directive?: None  Advance Directive offered?: AD Booklet refused    Domestic Abuse  Have you ever been the victim of abuse or violence?: No  Physical Abuse or Sexual Abuse: No  Verbal Abuse or Emotional Abuse: No  Possible Abuse/Neglect Reported to:: Not Applicable    Psychological Assessment  History of Substance Abuse: None  History of Psychiatric Problems: No  Non-compliant with Treatment: No    Discharge Risks or Barriers  Discharge risks or barriers?: No PCP    Anticipated Discharge Information  Discharge Disposition: Discharged to home/self care (01)

## 2023-08-16 NOTE — PROGRESS NOTES
[facility] Medicine Daily Progress Note    Date of Service  2023    Chief Complaint  Felipa Chavez is a 82 y.o. female admitted 8/15/2023 with fall     Hospital Course  This is a 81 y/o F with PMHX of hypothyroidism, hypertension, CAD, JEREMIAH with intermittent use of CPAP and depression who presented to the ED on 8/15/2023 after having an episode of syncope and collapse.  she was [place] the bank, she suddenly felt nauseated and dizzy and then passed out.  She recalls lying on the floor feeling confused, unclear what happened. She denies experiencing anything other than dizziness or nausea prior to falling, denies lightheadedness, chest pain, palpitations, shortness of breath, or diaphoresis  She reports significant stress since her   recently and has lost 30 pounds over the last 6-month since he passed away.  She has since had to live with different daughters and grandkids, but this has not been working out, and she has been significantly depressed over feeling like she has no place to live. She has not seen a provider since last February or March when she lived in [place].  She is currently living locally with her granddaughter, but home life is incredibly stressful for her.  She reports of being compliant with her medications.    CT head  obtained in the ED is negative for any acute findings, but shows right sphenoid sinusitis.  CT of her cervical spine is negative for acute fracture or dislocation, but does show multilevel disc and facet degeneration.  Chest x-ray is negative  Patient admitted for further work up and treatment     Interval Problem Update  Patient seen and examind, afebrile, still feels weak and tired, has some nausea , denies chest pain  BP still not well controlled.adding amlodipine  Echo pending    I have discussed this patient's plan of care and discharge plan at IDT rounds today with Case Management, Nursing, Nursing leadership, and other members of the IDT  team.    Consultants/Specialty  None     Code Status  Full Code    Disposition  The patient is not medically cleared for discharge to home or a post-acute facility.      I have placed the appropriate orders for post-discharge needs.    Review of Systems  Review of Systems   Constitutional:  Positive for malaise/fatigue and weight loss. Negative for chills, diaphoresis and fever.   HENT:  Negative for congestion, ear pain, sinus pain and sore throat.    Eyes: Negative.    Respiratory:  Negative for cough, sputum production, shortness of breath and wheezing.    Cardiovascular:  Positive for leg swelling (Reports intermittent leg swelling that resolves after she lays down for a while). Negative for chest pain, palpitations and orthopnea.   Gastrointestinal:  Positive for constipation (Chronic), heartburn (Chronic) and nausea. Negative for abdominal pain, blood in stool and vomiting.   Genitourinary:  Negative for dysuria, flank pain, frequency, hematuria and urgency.   Musculoskeletal:  Positive for falls (Positive for syncope and collapse). Negative for back pain, myalgias and neck pain.   Skin:  Negative for itching and rash.   Neurological:  Positive for dizziness. Negative for weakness and headaches.   Psychiatric/Behavioral:  Positive for depression. The patient is nervous/anxious.    All other systems reviewed and are negative.       Physical Exam  Temp:  [36 °C (96.8 °F)-37.1 °C (98.8 °F)] 36 °C (96.8 °F)  Pulse:  [67-86] 74  Resp:  [18-25] 18  BP: (110-213)/(56-82) 144/56  SpO2:  [88 %-99 %] 94 %    Physical Exam  Vitals and nursing note reviewed.   Constitutional:       General: She is not in acute distress.     Appearance: She is not ill-appearing.      Comments: Frail   HENT:      Head: Normocephalic and atraumatic.      Nose: Nose normal. No congestion or rhinorrhea.      Mouth/Throat:      Mouth: Mucous membranes are dry.      Pharynx: Oropharynx is clear.   Eyes:      Extraocular Movements: Extraocular  movements intact.      Pupils: Pupils are equal, round, and reactive to light.   Cardiovascular:      Rate and Rhythm: Normal rate and regular rhythm.      Pulses: Normal pulses.      Heart sounds: No murmur heard.     No friction rub. No gallop.   Pulmonary:      Effort: Pulmonary effort is normal. No respiratory distress.      Breath sounds: No wheezing, rhonchi or rales.   Abdominal:      General: Abdomen is flat. There is no distension.      Palpations: Abdomen is soft.      Tenderness: There is no abdominal tenderness. There is no guarding or rebound.   Musculoskeletal:         General: Normal range of motion.      Cervical back: Normal range of motion.      Right lower leg: No edema.      Left lower leg: No edema.   Skin:     General: Skin is warm and dry.      Capillary Refill: Capillary refill takes less than 2 seconds.      Coloration: Skin is not jaundiced.      Findings: No bruising or lesion.   Neurological:      Mental Status: She is alert and oriented to person, place, and time.   Psychiatric:         Mood and Affect: Mood normal.         Behavior: Behavior normal.         Fluids    Intake/Output Summary (Last 24 hours) at 8/16/2023 1416  Last data filed at 8/16/2023 0501  Gross per 24 hour   Intake --   Output 150 ml   Net -150 ml       Laboratory  Recent Labs     08/15/23  1345 08/16/23  0206   WBC 9.9 8.7   RBC 4.13* 3.89*   HEMOGLOBIN 12.8 12.0   HEMATOCRIT 39.9 37.6   MCV 96.6 96.7   MCH 31.0 30.8   MCHC 32.1* 31.9*   RDW 48.4 48.7   PLATELETCT 259 239   MPV 9.4 9.6     Recent Labs     08/15/23  1345 08/16/23  0206   SODIUM 137 136   POTASSIUM 4.8 4.8   CHLORIDE 99 100   CO2 28 27   GLUCOSE 127* 97   BUN 24* 27*   CREATININE 1.04 1.11   CALCIUM 9.7 8.8                   Imaging  DX-CHEST-PORTABLE (1 VIEW)   Final Result      No acute cardiopulmonary abnormality.      CT-CSPINE WITHOUT PLUS RECONS   Final Result      No acute fracture or dislocation of the cervical spine.      Multilevel disc and  facet degeneration.      CT-HEAD W/O   Final Result      1.  Chronic microvascular ischemic type changes.   2.  No acute intracranial abnormality.   3.  Right sphenoid sinusitis         EC-ECHOCARDIOGRAM COMPLETE W/O CONT    (Results Pending)        Assessment/Plan  * Syncope and collapse- (present on admission)  Assessment & Plan  Episode of syncope and collapse while at the bank today.  2-month history of near syncope.  All episodes associated with nausea  -continue close monitoring on telemetry for any arrhythmias and/or decompensation  Echo has been ordered and pending     JEREMIAH (obstructive sleep apnea)- (present on admission)  Assessment & Plan  Noncompliant with CPAP, as she she says she is unable to sleep with the mask over her face.  She reports waking with extreme fatigue and is tired throughout the day.  Prior to the CPAP, she was using oxygen at night.  Request not to use CPAP here as well  - Supplemental oxygen 2 L at at bedtime  - Encourage follow-up for CPAP adjustment    Hyperglycemia- (present on admission)  Assessment & Plan  Blood sugar mildly elevated.  Reports intermittent peripheral neuropathy, which she was suffering from during my exam  -A1c    Situational stress- (present on admission)  Assessment & Plan  Patient's   recently, and she has not really had a place to live since, and has been living with various daughters and grandchildren.  She has had weight loss of about 30 pounds over the last 6 months, has had months of nausea and now with syncope.  Stress may be contributing to her current symptoms.  -Patient would benefit from case management intervention  -Ativan p.o. as needed     Hypothyroidism- (present on admission)  Assessment & Plan  - Continue Synthroid  -TSH levl    GERD (gastroesophageal reflux disease)- (present on admission)  Assessment & Plan  - Continue Protonix    HTN (hypertension)- (present on admission)  Assessment & Plan  BP still not well controlled SBP  140-150  adding amlodipine to her regimen   Close monitoring and adjust as needed           VTE prophylaxis:    heparin ppx      I have performed a physical exam and reviewed and updated ROS and Plan today (8/16/2023). In review of yesterday's note (8/15/2023), there are no changes except as documented above.

## 2023-08-16 NOTE — H&P
Hospital Medicine History & Physical Note    Date of Service  8/15/2023    Primary Care Physician  Pcp Not In Computer        Code Status  Full Code    Chief Complaint  Chief Complaint   Patient presents with    Fall       History of Presenting Illness  Felipa Chavez is a 82 y.o. female with a past medical history of hypothyroidism, hypertension, CAD, JEREMIAH with intermittent use of CPAP and depression who presented to the ED on 8/15/2023 after having an episode of syncope and collapse.  The patient tells me that while she was at the bank, she suddenly felt nauseated and dizzy and then passed out.  She recalls lying on the floor feeling confused, unclear what happened. She denies experiencing anything other than dizziness or nausea prior to falling, denies lightheadedness, chest pain, palpitations, shortness of breath, or diaphoresis. She reports a history of intermittent nausea over the last couple years that she has had multiple evaluations for, including EGD and colonoscopy. She rarely has vomiting associated with this. Over the last couple months, her nausea has been associated with dizziness and, most recently, syncope. Last month, she had 2 episodes of nausea and dizziness and near syncope, both episodes within a week of part. The last episode, she was leaning on a shopping cart while at [facility] when she suddenly had nausea and dizziness and feeling like she was going to pass out.  She was able to get assistance from another customer in the store, and did not pass out or fall on that occasion.  She denies recent cold or flulike symptoms.  She has not been eating well, has chronic constipation.  She denies noting blood in her stool.  She denies dysuria.  She has been told in the past that she has had an irregular heart beat. She reports significant stress since her   recently and has lost 30 pounds over the last 6-month since he passed away.  She has since had to live with different daughters and  grandkids, but this has not been working out, and she has been significantly depressed over feeling like she has no place to live. She has not seen a provider since last February or March when she lived in [place].  She is currently living locally with her granddaughter, but home life is incredibly stressful for her.  She reports of being compliant with her medications.  However, she is supposed to use a CPAP at night for her sleep apnea, but feels she is unable to get any sleep with this.  She wakes feeling very tired and fatigued.  She has not seen a provider to adjust any of her settings or follow-up since last November.    Head CT obtained in the ED is negative for any acute findings, but shows right sphenoid sinusitis.  CT of her cervical spine is negative for acute fracture or dislocation, but does show multilevel disc and facet degeneration.  Chest x-ray is negative.  CBC is unremarkable.  CMP shows glucose 127, BUN 24, eGFR 53.  Troponin is normal.  EKG is sinus rhythm, 67 bpm, no acute ST-T changes or arrhythmias.    I discussed the plan of care with patient.    Review of Systems  Review of Systems   Constitutional:  Positive for malaise/fatigue and weight loss. Negative for chills, diaphoresis and fever.   HENT:  Negative for congestion, ear pain, sinus pain and sore throat.    Eyes: Negative.    Respiratory:  Negative for cough, sputum production, shortness of breath and wheezing.    Cardiovascular:  Positive for leg swelling (Reports intermittent leg swelling that resolves after she lays down for a while). Negative for chest pain, palpitations and orthopnea.   Gastrointestinal:  Positive for constipation (Chronic), heartburn (Chronic) and nausea. Negative for abdominal pain, blood in stool and vomiting.   Genitourinary:  Negative for dysuria, flank pain, frequency, hematuria and urgency.   Musculoskeletal:  Positive for falls (Positive for syncope and collapse). Negative for back pain, myalgias and  neck pain.   Skin:  Negative for itching and rash.   Neurological:  Positive for dizziness. Negative for weakness and headaches.   Psychiatric/Behavioral:  Positive for depression. The patient is nervous/anxious.        Past Medical History   has a past medical history of Chronic low back pain (11/10/2010), Esophageal stricture (11/10/2010), GERD (gastroesophageal reflux disease) (11/10/2010), HTN (hypertension) (11/10/2010), and Hypothyroidism (11/10/2010).  JEREMIAH, Depression    Surgical History   has a past surgical history that includes laminotomy; knee arthroscopy; abdominal hysterectomy total; rotator cuff repair; cholecystectomy; and appendectomy.     Family History  family history includes Cancer in her mother; Heart Disease (age of onset: 61) in her father.   Family history reviewed with patient. There is no family history that is pertinent to the chief complaint.     Social History   reports that she has never smoked. She has never used smokeless tobacco. She reports that she does not drink alcohol and does not use drugs.    Allergies  Allergies   Allergen Reactions    Codeine     Pcn [Penicillins]      As a child       Medications  Prior to Admission Medications   Prescriptions Last Dose Informant Patient Reported? Taking?   Cholecalciferol (VITAMIN D3 PO) 8/14/2023 at 1500 Patient Yes Yes   Sig: Take 1 Tablet by mouth every day.   Cyanocobalamin (VITAMIN B 12 PO) 8/14/2023 at 1500 Patient Yes Yes   Sig: Take 1 Tablet by mouth every day.   Dabigatran Etexilate Mesylate (PRADAXA) 110 MG Cap 8/15/2023 at AM Patient Yes Yes   Sig: Take 110 mg by mouth 2 times a day.   MAGNESIUM PO 8/14/2023 at 1500 Patient Yes Yes   Sig: Take 1 Tablet by mouth every day.   docusate sodium (COLACE) 100 MG Cap 8/14/2023 at PM Patient Yes Yes   Sig: Take 100 mg by mouth at bedtime.   fluticasone (FLONASE) 50 MCG/ACT nasal spray 8/14/2023 at PM Patient Yes Yes   Sig: Administer 1 Spray into affected nostril(S) at bedtime.    levothyroxine (SYNTHROID) 100 MCG Tab 8/15/2023 at AM Patient Yes Yes   Sig: Take 100 mcg by mouth every morning on an empty stomach.   losartan (COZAAR) 100 MG Tab 8/15/2023 at AM Patient Yes Yes   Sig: Take 100 mg by mouth every day.   metoprolol tartrate (LOPRESSOR) 25 MG Tab 8/15/2023 at AM Patient Yes Yes   Sig: Take 25 mg by mouth 2 times a day.   ondansetron (ZOFRAN ODT) 4 MG TABLET DISPERSIBLE 8/15/2023 at AM Patient Yes Yes   Sig: Take 4 mg by mouth every 8 hours as needed for Nausea/Vomiting.   pantoprazole (PROTONIX) 40 MG Tablet Delayed Response 8/14/2023 at PM Patient Yes Yes   Sig: Take 40 mg by mouth 2 times a day.      Facility-Administered Medications: None       Physical Exam  Temp:  [36.7 °C (98 °F)-36.8 °C (98.2 °F)] 36.8 °C (98.2 °F)  Pulse:  [67-86] 86  Resp:  [17-25] 20  BP: (115-213)/(59-82) 140/61  SpO2:  [88 %-96 %] 91 %  Blood Pressure : 138/66   Temperature: 36.7 °C (98 °F)   Pulse: 79   Respiration: (!) 21   Pulse Oximetry: 88 %       Physical Exam  Constitutional:       General: She is not in acute distress.     Appearance: She is not ill-appearing.      Comments: Frail   HENT:      Head: Normocephalic and atraumatic.      Nose: Nose normal. No congestion or rhinorrhea.      Mouth/Throat:      Mouth: Mucous membranes are dry.      Pharynx: Oropharynx is clear.   Eyes:      Extraocular Movements: Extraocular movements intact.      Pupils: Pupils are equal, round, and reactive to light.   Cardiovascular:      Rate and Rhythm: Normal rate and regular rhythm.      Pulses: Normal pulses.      Heart sounds: No murmur heard.     No friction rub. No gallop.   Pulmonary:      Effort: Pulmonary effort is normal. No respiratory distress.      Breath sounds: No wheezing, rhonchi or rales.   Abdominal:      General: Abdomen is flat. There is no distension.      Palpations: Abdomen is soft.      Tenderness: There is no abdominal tenderness. There is no guarding.   Musculoskeletal:         General:  Normal range of motion.      Cervical back: Normal range of motion.      Right lower leg: No edema.      Left lower leg: No edema.   Skin:     General: Skin is warm and dry.      Capillary Refill: Capillary refill takes less than 2 seconds.      Coloration: Skin is not jaundiced.      Findings: No bruising or lesion.   Neurological:      Mental Status: She is alert and oriented to person, place, and time.   Psychiatric:         Mood and Affect: Mood normal.         Behavior: Behavior normal.         Laboratory:  Recent Labs     08/15/23  1345   WBC 9.9   RBC 4.13*   HEMOGLOBIN 12.8   HEMATOCRIT 39.9   MCV 96.6   MCH 31.0   MCHC 32.1*   RDW 48.4   PLATELETCT 259   MPV 9.4     Recent Labs     08/15/23  1345   SODIUM 137   POTASSIUM 4.8   CHLORIDE 99   CO2 28   GLUCOSE 127*   BUN 24*   CREATININE 1.04   CALCIUM 9.7     Recent Labs     08/15/23  1345   ALTSGPT 15   ASTSGOT 21   ALKPHOSPHAT 95   TBILIRUBIN 1.0   GLUCOSE 127*         No results for input(s): NTPROBNP in the last 72 hours.      Recent Labs     08/15/23  1345   TROPONINT 13       Imaging:  DX-CHEST-PORTABLE (1 VIEW)   Final Result      No acute cardiopulmonary abnormality.      CT-CSPINE WITHOUT PLUS RECONS   Final Result      No acute fracture or dislocation of the cervical spine.      Multilevel disc and facet degeneration.      CT-HEAD W/O   Final Result      1.  Chronic microvascular ischemic type changes.   2.  No acute intracranial abnormality.   3.  Right sphenoid sinusitis         EC-ECHOCARDIOGRAM COMPLETE W/O CONT    (Results Pending)     EKG 8/15/2023  Measurements   Intervals                                Axis   Rate:      67                           P:          52   KY:         201                         QRS:     -16   QRSD:    96                           T:          41   QT:         377   QTc:       398     Interpretive Statements   Sinus rhythm   Borderline left axis deviation         ASSESSMENT/PLAN:  * Syncope and collapse- (present on  admission)  Assessment & Plan  Episode of syncope and collapse while at the bank today.  2-month history of near syncope.  All episodes associated with nausea  -Telemetry monitor  -Orthostatic blood pressures every shift  -Echocardiogram  -TSH, B12, folate  - recommend Holter monitor, as patient reports a history of being told she has arrhythmias    HTN (hypertension)- (present on admission)  Assessment & Plan  Blood pressure in the ED up to 213/77, followed by 115/59.  Blood pressure returned to normal on its own without intervention in the ED. She does report significant amount of stress, so this may be related.  - Continue home antihypertensives    JEREMIAH (obstructive sleep apnea)- (present on admission)  Assessment & Plan  Noncompliant with CPAP, as she she says she is unable to sleep with the mask over her face.  She reports waking with extreme fatigue and is tired throughout the day.  Prior to the CPAP, she was using oxygen at night.  Request not to use CPAP here as well  - Supplemental oxygen 2 L at at bedtime  - Encourage follow-up for CPAP adjustment    Situational stress- (present on admission)  Assessment & Plan  Patient's   recently, and she has not really had a place to live since, and has been living with various daughters and grandchildren.  She has had weight loss of about 30 pounds over the last 6 months, has had months of nausea and now with syncope.  Stress may be contributing to her current symptoms.  -Patient would benefit from case management intervention  -Ativan p.o. as needed     GERD (gastroesophageal reflux disease)- (present on admission)  Assessment & Plan  - Continue Protonix    Hyperglycemia- (present on admission)  Assessment & Plan  Blood sugar mildly elevated.  Reports intermittent peripheral neuropathy, which she was suffering from during my exam  -A1c    Hypothyroidism- (present on admission)  Assessment & Plan  - Continue Synthroid  -TSH levl      Justification for  Admission Status  I anticipate this patient is appropriate for observation status at this time because need for overnight observation with telemetry monitor, labs and echocardiogram to determine etiology of syncope and collapse    Patient will need a Telemetry bed on MEDICAL service. The need is secondary to need to evaluate arrhythmia contributing to recent syncope and collapse.    VTE prophylaxis: SCDs/TEDs and heparin ppx

## 2023-08-16 NOTE — ASSESSMENT & PLAN NOTE
Episode of syncope and collapse while at the bank today.  2-month history of near syncope.  All episodes associated with nausea, likely vasovagal  No arrhythmias noted on telemetry  Echo revealed LVEF 65% aortic valve sclerosis without stenosis  PT OT

## 2023-08-16 NOTE — ASSESSMENT & PLAN NOTE
A1c 5.9  Weight loss not indicated due to severe protein-calorie malnutrition  Repeat A1c in 3 months with PCP

## 2023-08-16 NOTE — PROGRESS NOTES
0700 - Report received from Zaira SINGLETON at patient's bedside. Patient resting in bed quietly with no complaints at this time. Telemetry monitor intact et functioning. Call light and belongings within reach, safety measures intact, white board updated.     0900 - Ambulated to bathroom. C/o generalized weakness, tolerated ambulation with personal walker.     1300 - Ambulated to bathroom, then sitting up in chair. Tolerated well.     1625 - Patient returned to bed. C/o severe pain to right neck/shoulder. Requested pain medication order from provider.     1700 - Patient resting in bed quietly.    1900 - Reported off to Morena SINGLETON

## 2023-08-17 PROBLEM — I48.91 ATRIAL FIBRILLATION (HCC): Status: ACTIVE | Noted: 2023-08-17

## 2023-08-17 LAB
ANION GAP SERPL CALC-SCNC: 9 MMOL/L (ref 7–16)
BUN SERPL-MCNC: 23 MG/DL (ref 8–22)
CALCIUM SERPL-MCNC: 8.7 MG/DL (ref 8.5–10.5)
CHLORIDE SERPL-SCNC: 97 MMOL/L (ref 96–112)
CO2 SERPL-SCNC: 27 MMOL/L (ref 20–33)
CREAT SERPL-MCNC: 1 MG/DL (ref 0.5–1.4)
ERYTHROCYTE [DISTWIDTH] IN BLOOD BY AUTOMATED COUNT: 48.1 FL (ref 35.9–50)
GFR SERPLBLD CREATININE-BSD FMLA CKD-EPI: 55 ML/MIN/1.73 M 2
GLUCOSE SERPL-MCNC: 115 MG/DL (ref 65–99)
HCT VFR BLD AUTO: 38.2 % (ref 37–47)
HGB BLD-MCNC: 12.4 G/DL (ref 12–16)
MCH RBC QN AUTO: 31.2 PG (ref 27–33)
MCHC RBC AUTO-ENTMCNC: 32.5 G/DL (ref 32.2–35.5)
MCV RBC AUTO: 96 FL (ref 81.4–97.8)
PLATELET # BLD AUTO: 242 K/UL (ref 164–446)
PMV BLD AUTO: 9.7 FL (ref 9–12.9)
POTASSIUM SERPL-SCNC: 4.5 MMOL/L (ref 3.6–5.5)
RBC # BLD AUTO: 3.98 M/UL (ref 4.2–5.4)
SODIUM SERPL-SCNC: 133 MMOL/L (ref 135–145)
WBC # BLD AUTO: 6 K/UL (ref 4.8–10.8)

## 2023-08-17 PROCEDURE — 36415 COLL VENOUS BLD VENIPUNCTURE: CPT

## 2023-08-17 PROCEDURE — G0378 HOSPITAL OBSERVATION PER HR: HCPCS

## 2023-08-17 PROCEDURE — 96374 THER/PROPH/DIAG INJ IV PUSH: CPT

## 2023-08-17 PROCEDURE — 99232 SBSQ HOSP IP/OBS MODERATE 35: CPT | Performed by: INTERNAL MEDICINE

## 2023-08-17 PROCEDURE — A9270 NON-COVERED ITEM OR SERVICE: HCPCS | Performed by: INTERNAL MEDICINE

## 2023-08-17 PROCEDURE — 700102 HCHG RX REV CODE 250 W/ 637 OVERRIDE(OP): Performed by: INTERNAL MEDICINE

## 2023-08-17 PROCEDURE — 85027 COMPLETE CBC AUTOMATED: CPT

## 2023-08-17 PROCEDURE — 96372 THER/PROPH/DIAG INJ SC/IM: CPT | Mod: XU

## 2023-08-17 PROCEDURE — 80048 BASIC METABOLIC PNL TOTAL CA: CPT

## 2023-08-17 PROCEDURE — 700102 HCHG RX REV CODE 250 W/ 637 OVERRIDE(OP): Performed by: NURSE PRACTITIONER

## 2023-08-17 PROCEDURE — A9270 NON-COVERED ITEM OR SERVICE: HCPCS | Performed by: NURSE PRACTITIONER

## 2023-08-17 PROCEDURE — 700111 HCHG RX REV CODE 636 W/ 250 OVERRIDE (IP): Performed by: NURSE PRACTITIONER

## 2023-08-17 RX ORDER — DABIGATRAN ETEXILATE 150 MG/1
150 CAPSULE ORAL 2 TIMES DAILY
Status: DISCONTINUED | OUTPATIENT
Start: 2023-08-17 | End: 2023-08-22

## 2023-08-17 RX ORDER — IBUPROFEN 600 MG/1
600 TABLET ORAL
Status: DISCONTINUED | OUTPATIENT
Start: 2023-08-17 | End: 2023-08-26

## 2023-08-17 RX ORDER — LOSARTAN POTASSIUM 50 MG/1
100 TABLET ORAL
Status: DISCONTINUED | OUTPATIENT
Start: 2023-08-17 | End: 2023-08-17

## 2023-08-17 RX ORDER — AMLODIPINE BESYLATE 5 MG/1
5 TABLET ORAL ONCE
Status: COMPLETED | OUTPATIENT
Start: 2023-08-17 | End: 2023-08-17

## 2023-08-17 RX ORDER — AMLODIPINE BESYLATE 10 MG/1
10 TABLET ORAL
Status: DISCONTINUED | OUTPATIENT
Start: 2023-08-18 | End: 2023-09-02 | Stop reason: HOSPADM

## 2023-08-17 RX ADMIN — AMLODIPINE BESYLATE 5 MG: 5 TABLET ORAL at 10:01

## 2023-08-17 RX ADMIN — SENNOSIDES AND DOCUSATE SODIUM 2 TABLET: 50; 8.6 TABLET ORAL at 05:37

## 2023-08-17 RX ADMIN — HEPARIN SODIUM 5000 UNITS: 5000 INJECTION, SOLUTION INTRAVENOUS; SUBCUTANEOUS at 05:42

## 2023-08-17 RX ADMIN — ACETAMINOPHEN 650 MG: 325 TABLET, FILM COATED ORAL at 08:44

## 2023-08-17 RX ADMIN — OMEPRAZOLE 20 MG: 20 CAPSULE, DELAYED RELEASE ORAL at 17:23

## 2023-08-17 RX ADMIN — AMLODIPINE BESYLATE 2.5 MG: 5 TABLET ORAL at 05:42

## 2023-08-17 RX ADMIN — ACETAMINOPHEN 650 MG: 325 TABLET, FILM COATED ORAL at 15:27

## 2023-08-17 RX ADMIN — ONDANSETRON 4 MG: 4 TABLET, ORALLY DISINTEGRATING ORAL at 22:52

## 2023-08-17 RX ADMIN — DABIGATRAN ETEXILATE MESYLATE 150 MG: 150 CAPSULE ORAL at 17:23

## 2023-08-17 RX ADMIN — METOPROLOL TARTRATE 25 MG: 25 TABLET, FILM COATED ORAL at 12:29

## 2023-08-17 RX ADMIN — ONDANSETRON 4 MG: 2 INJECTION INTRAMUSCULAR; INTRAVENOUS at 08:40

## 2023-08-17 RX ADMIN — ACETAMINOPHEN 650 MG: 325 TABLET, FILM COATED ORAL at 22:52

## 2023-08-17 RX ADMIN — OMEPRAZOLE 20 MG: 20 CAPSULE, DELAYED RELEASE ORAL at 05:42

## 2023-08-17 RX ADMIN — LEVOTHYROXINE SODIUM 100 MCG: 0.1 TABLET ORAL at 05:42

## 2023-08-17 RX ADMIN — SENNOSIDES AND DOCUSATE SODIUM 2 TABLET: 50; 8.6 TABLET ORAL at 14:17

## 2023-08-17 RX ADMIN — Medication 1000 UNITS: at 05:43

## 2023-08-17 RX ADMIN — METOPROLOL TARTRATE 25 MG: 25 TABLET, FILM COATED ORAL at 17:23

## 2023-08-17 ASSESSMENT — PAIN DESCRIPTION - PAIN TYPE
TYPE: ACUTE PAIN

## 2023-08-17 ASSESSMENT — ENCOUNTER SYMPTOMS
COUGH: 0
CONSTIPATION: 1
FEVER: 0
MYALGIAS: 0
CHILLS: 0
EYES NEGATIVE: 1
SHORTNESS OF BREATH: 0
DIZZINESS: 1
WEIGHT LOSS: 1
NAUSEA: 1
WHEEZING: 0
ORTHOPNEA: 0
HEADACHES: 0
PALPITATIONS: 0
WEAKNESS: 0
SPUTUM PRODUCTION: 0
DEPRESSION: 1
NERVOUS/ANXIOUS: 1
SINUS PAIN: 0
ABDOMINAL PAIN: 0
BLOOD IN STOOL: 0
DIAPHORESIS: 0
SORE THROAT: 0
NECK PAIN: 0
BACK PAIN: 0
HEARTBURN: 1
FALLS: 1
FLANK PAIN: 0
VOMITING: 0

## 2023-08-17 ASSESSMENT — FIBROSIS 4 INDEX: FIB4 SCORE: 1.88

## 2023-08-17 NOTE — PROGRESS NOTES
Assumed care of patient. Bedside report received from Antolin SINGLETON. Updated POC, call light within reach, fall precautions in place. Bed locked, and in lowest position, bed alarm on and working. Assessment completed, patient is A&Ox4, states no pain at this time. Patient instructed to call for assistance. All questions answered, no further needs at this time.

## 2023-08-17 NOTE — CARE PLAN
The patient is Stable - Low risk of patient condition declining or worsening    Shift Goals  Clinical Goals: hemodynamic stability, montior heart rate and vitals  Patient Goals: Pain control  Family Goals: VILLA      Problem: Knowledge Deficit - Standard  Goal: Patient and family/care givers will demonstrate understanding of plan of care, disease process/condition, diagnostic tests and medications  Outcome: Progressing  Note: Educated patient on POC, verbalizes understanding.      Problem: Fall Risk  Goal: Patient will remain free from falls  Outcome: Progressing     Problem: Pain - Standard  Goal: Alleviation of pain or a reduction in pain to the patient’s comfort goal  Outcome: Progressing  Note: New meds ordered, resting, and using food for pain relief.        Progress made toward(s) clinical / shift goals:  Progressing

## 2023-08-17 NOTE — CARE PLAN
The patient is Stable - Low risk of patient condition declining or worsening    Shift Goals  Clinical Goals: monitor BP, monitor pain  Patient Goals: pain control, rest  Family Goals: VILLA    Progress made toward(s) clinical / shift goals:    Problem: Knowledge Deficit - Standard  Goal: Patient and family/care givers will demonstrate understanding of plan of care, disease process/condition, diagnostic tests and medications  Description: Target End Date:  1-3 days or as soon as patient condition allows    Document in Patient Education    1.  Patient and family/caregiver oriented to unit, equipment, visitation policy and means for communicating concern  2.  Complete/review Learning Assessment  3.  Assess knowledge level of disease process/condition, treatment plan, diagnostic tests and medications  4.  Explain disease process/condition, treatment plan, diagnostic tests and medications  Outcome: Progressing  Note: Pt updated on POC, all questions answered at this time.     Problem: Fall Risk  Goal: Patient will remain free from falls  Description: Target End Date:  Prior to discharge or change in level of care    Document interventions on the [facility] Fall Risk Assessment    1.  Assess for fall risk factors  2.  Implement fall precautions  Outcome: Progressing  Note: Patient utilizing four wheeled, personal walker. Patient is a 1x assist. Patient educated to use call light if she needs to use the restroom. Bed in lowest, locked position. Treaded socks in place.     Problem: Pain - Standard  Goal: Alleviation of pain or a reduction in pain to the patient’s comfort goal  Description: Target End Date:  Prior to discharge or change in level of care    Document on Vitals flowsheet    1.  Document pain using the appropriate pain scale per order or unit policy  2.  Educate and implement non-pharmacologic comfort measures (i.e. relaxation, distraction, massage, cold/heat therapy, etc.)  3.  Pain management medications as  ordered  4.  Reassess pain after pain med administration per policy  5.  If opiods administered assess patient's response to pain medication is appropriate per POSS sedation scale  6.  Follow pain management plan developed in collaboration with patient and interdisciplinary team (including palliative care or pain specialists if applicable)  Outcome: Progressing  Flowsheets (Taken 8/17/2023 1527)  Pain Rating Scale (NPRS): 6  Note: Pt declaring moderate pain all day in neck, back, hip and thigh regions. See MAR for implemented pharmacological therapy. Patient repositioning self to alleviate pain as well.

## 2023-08-17 NOTE — PROGRESS NOTES
Pt having difficulty with chewing and swallowing meals. Diet altered to soft and bite size -- level 6.

## 2023-08-17 NOTE — PROGRESS NOTES
Bedside report received from night RN, pt care assumed. Pt is resting comfortably, A&Ox4, SR on the monitor. Updated on POC, questions answered. Bed in lowest, locked position, treaded socks on, call light and belongings within reach.

## 2023-08-17 NOTE — PROGRESS NOTES
Monitor Summary:   Rhythm: Sinus Rhythm  Rate: 59-93  Measurement: .17/.09/.44  Ectopy: PSVT up to 180, rare PVC's    12 hour chart check complete

## 2023-08-17 NOTE — PROGRESS NOTES
[facility] Medicine Daily Progress Note    Date of Service  2023    Chief Complaint  Felipa Chavez is a 82 y.o. female admitted 8/15/2023 with fall     Hospital Course  This is a 81 y/o F with PMHX of hypothyroidism, hypertension, CAD, JEREMIAH with intermittent use of CPAP and depression who presented to the ED on 8/15/2023 after having an episode of syncope and collapse.  [place], she suddenly felt nauseated and dizzy and then passed out.  She recalls lying on the floor feeling confused, unclear what happened. She denies experiencing anything other than dizziness or nausea prior to falling, denies lightheadedness, chest pain, palpitations, shortness of breath, or diaphoresis  She reports significant stress since her   recently and has lost 30 pounds over the last 6-month since he passed away.  She has since had to live with different daughters and grandkids, but this has not been working out, and she has been significantly depressed over feeling like she has no place to live. She has not seen a provider since last February or March when she lived in [place].  She is currently living locally with her granddaughter, but home life is incredibly stressful for her.  She reports of being compliant with her medications.    CT head  obtained in the ED is negative for any acute findings, but shows right sphenoid sinusitis.  CT of her cervical spine is negative for acute fracture or dislocation, but does show multilevel disc and facet degeneration.  Chest x-ray is negative  Patient admitted for further work up and treatment     Interval Problem Update  Patient seen and examind, afebrile, still feels weak and tired, has some nausea , denies chest pain  BP still not well controlled.adding amlodipine  Echo pending  : Patient resting in bed afebrile, BP not well controlled today, SBP of 180 increased amlodipine dose, also starting back metoprolol also for her A.fibb restarting pradaxa.  PT/OT eval   I  have discussed this patient's plan of care and discharge plan at IDT rounds today with Case Management, Nursing, Nursing leadership, and other members of the IDT team.    Consultants/Specialty  None     Code Status  Full Code    Disposition  The patient is not medically cleared for discharge to home or a post-acute facility.      I have placed the appropriate orders for post-discharge needs.    Review of Systems  Review of Systems   Constitutional:  Positive for malaise/fatigue and weight loss. Negative for chills, diaphoresis and fever.   HENT:  Negative for congestion, ear pain, sinus pain and sore throat.    Eyes: Negative.    Respiratory:  Negative for cough, sputum production, shortness of breath and wheezing.    Cardiovascular:  Positive for leg swelling (Reports intermittent leg swelling that resolves after she lays down for a while). Negative for chest pain, palpitations and orthopnea.   Gastrointestinal:  Positive for constipation (Chronic), heartburn (Chronic) and nausea. Negative for abdominal pain, blood in stool and vomiting.   Genitourinary:  Negative for dysuria, flank pain, frequency, hematuria and urgency.   Musculoskeletal:  Positive for falls (Positive for syncope and collapse). Negative for back pain, myalgias and neck pain.   Skin:  Negative for itching and rash.   Neurological:  Positive for dizziness. Negative for weakness and headaches.   Psychiatric/Behavioral:  Positive for depression. The patient is nervous/anxious.    All other systems reviewed and are negative.       Physical Exam  Temp:  [36.2 °C (97.2 °F)-37.1 °C (98.8 °F)] 36.5 °C (97.7 °F)  Pulse:  [71-87] 84  Resp:  [18-20] 18  BP: (118-184)/(65-92) 156/74  SpO2:  [90 %-98 %] 92 %    Physical Exam  Vitals and nursing note reviewed.   Constitutional:       General: She is not in acute distress.     Appearance: She is not ill-appearing.      Comments: Frail   HENT:      Head: Normocephalic and atraumatic.      Nose: Nose normal. No  congestion or rhinorrhea.      Mouth/Throat:      Mouth: Mucous membranes are dry.      Pharynx: Oropharynx is clear.   Eyes:      Extraocular Movements: Extraocular movements intact.      Pupils: Pupils are equal, round, and reactive to light.   Cardiovascular:      Rate and Rhythm: Normal rate and regular rhythm.      Pulses: Normal pulses.      Heart sounds: No murmur heard.     No friction rub. No gallop.   Pulmonary:      Effort: Pulmonary effort is normal. No respiratory distress.      Breath sounds: No wheezing, rhonchi or rales.   Abdominal:      General: Abdomen is flat. There is no distension.      Palpations: Abdomen is soft.      Tenderness: There is no abdominal tenderness. There is no guarding or rebound.   Musculoskeletal:         General: Normal range of motion.      Cervical back: Normal range of motion.      Right lower leg: No edema.      Left lower leg: No edema.   Skin:     General: Skin is warm and dry.      Capillary Refill: Capillary refill takes less than 2 seconds.      Coloration: Skin is not jaundiced.      Findings: No bruising or lesion.   Neurological:      Mental Status: She is alert and oriented to person, place, and time.   Psychiatric:         Mood and Affect: Mood normal.         Behavior: Behavior normal.         Fluids    Intake/Output Summary (Last 24 hours) at 8/17/2023 1531  Last data filed at 8/16/2023 2100  Gross per 24 hour   Intake 1220 ml   Output --   Net 1220 ml       Laboratory  Recent Labs     08/15/23  1345 08/16/23  0206 08/17/23  0244   WBC 9.9 8.7 6.0   RBC 4.13* 3.89* 3.98*   HEMOGLOBIN 12.8 12.0 12.4   HEMATOCRIT 39.9 37.6 38.2   MCV 96.6 96.7 96.0   MCH 31.0 30.8 31.2   MCHC 32.1* 31.9* 32.5   RDW 48.4 48.7 48.1   PLATELETCT 259 239 242   MPV 9.4 9.6 9.7     Recent Labs     08/15/23  1345 08/16/23  0206 08/17/23  0244   SODIUM 137 136 133*   POTASSIUM 4.8 4.8 4.5   CHLORIDE 99 100 97   CO2 28 27 27   GLUCOSE 127* 97 115*   BUN 24* 27* 23*   CREATININE 1.04  1.11 1.00   CALCIUM 9.7 8.8 8.7                   Imaging  EC-ECHOCARDIOGRAM COMPLETE W/O CONT   Final Result      DX-CHEST-PORTABLE (1 VIEW)   Final Result      No acute cardiopulmonary abnormality.      CT-CSPINE WITHOUT PLUS RECONS   Final Result      No acute fracture or dislocation of the cervical spine.      Multilevel disc and facet degeneration.      CT-HEAD W/O   Final Result      1.  Chronic microvascular ischemic type changes.   2.  No acute intracranial abnormality.   3.  Right sphenoid sinusitis              Assessment/Plan  * Syncope and collapse- (present on admission)  Assessment & Plan  Episode of syncope and collapse while at the bank today.  2-month history of near syncope.  All episodes associated with nausea  -continue close monitoring on telemetry for any arrhythmias and/or decompensation  Echo has been ordered and pending     Atrial fibrillation (HCC)  Assessment & Plan  Restarting back her metoprolol  Also restarting back her pradaxa     JEREMIAH (obstructive sleep apnea)- (present on admission)  Assessment & Plan  Noncompliant with CPAP, as she she says she is unable to sleep with the mask over her face.  She reports waking with extreme fatigue and is tired throughout the day.  Prior to the CPAP, she was using oxygen at night.  Request not to use CPAP here as well  - Supplemental oxygen 2 L at at bedtime  - Encourage follow-up for CPAP adjustment    Hyperglycemia- (present on admission)  Assessment & Plan  Blood sugar mildly elevated.  Reports intermittent peripheral neuropathy, which she was suffering from during my exam  -A1c    Situational stress- (present on admission)  Assessment & Plan  Patient's   recently, and she has not really had a place to live since, and has been living with various daughters and grandchildren.  She has had weight loss of about 30 pounds over the last 6 months, has had months of nausea and now with syncope.  Stress may be contributing to her current  symptoms.  -Patient would benefit from case management intervention  -Ativan p.o. as needed     Hypothyroidism- (present on admission)  Assessment & Plan  - Continue Synthroid  -TSH levl    GERD (gastroesophageal reflux disease)- (present on admission)  Assessment & Plan  - Continue Protonix    HTN (hypertension)- (present on admission)  Assessment & Plan  BP still not well controlled   Increased amlodipine and starting back metoprolol   Close monitoring and adjust as needed           VTE prophylaxis:    therapeutic anticoagulation with pradaxa 150 mg BID      I have performed a physical exam and reviewed and updated ROS and Plan today (8/17/2023). In review of yesterday's note (8/16/2023), there are no changes except as documented above.

## 2023-08-18 LAB
1,25(OH)2D3 SERPL-MCNC: 53.2 PG/ML (ref 19.9–79.3)
ANION GAP SERPL CALC-SCNC: 8 MMOL/L (ref 7–16)
BUN SERPL-MCNC: 21 MG/DL (ref 8–22)
CALCIUM SERPL-MCNC: 8.7 MG/DL (ref 8.5–10.5)
CHLORIDE SERPL-SCNC: 97 MMOL/L (ref 96–112)
CO2 SERPL-SCNC: 27 MMOL/L (ref 20–33)
CREAT SERPL-MCNC: 1.06 MG/DL (ref 0.5–1.4)
ERYTHROCYTE [DISTWIDTH] IN BLOOD BY AUTOMATED COUNT: 45.7 FL (ref 35.9–50)
GFR SERPLBLD CREATININE-BSD FMLA CKD-EPI: 52 ML/MIN/1.73 M 2
GLUCOSE SERPL-MCNC: 122 MG/DL (ref 65–99)
HCT VFR BLD AUTO: 38.3 % (ref 37–47)
HGB BLD-MCNC: 12.8 G/DL (ref 12–16)
MCH RBC QN AUTO: 31.1 PG (ref 27–33)
MCHC RBC AUTO-ENTMCNC: 33.4 G/DL (ref 32.2–35.5)
MCV RBC AUTO: 93.2 FL (ref 81.4–97.8)
PLATELET # BLD AUTO: 238 K/UL (ref 164–446)
PMV BLD AUTO: 9.1 FL (ref 9–12.9)
POTASSIUM SERPL-SCNC: 4.2 MMOL/L (ref 3.6–5.5)
RBC # BLD AUTO: 4.11 M/UL (ref 4.2–5.4)
SODIUM SERPL-SCNC: 132 MMOL/L (ref 135–145)
WBC # BLD AUTO: 5.3 K/UL (ref 4.8–10.8)

## 2023-08-18 PROCEDURE — A9270 NON-COVERED ITEM OR SERVICE: HCPCS | Performed by: NURSE PRACTITIONER

## 2023-08-18 PROCEDURE — 700111 HCHG RX REV CODE 636 W/ 250 OVERRIDE (IP): Performed by: NURSE PRACTITIONER

## 2023-08-18 PROCEDURE — 85027 COMPLETE CBC AUTOMATED: CPT

## 2023-08-18 PROCEDURE — 96375 TX/PRO/DX INJ NEW DRUG ADDON: CPT

## 2023-08-18 PROCEDURE — G0378 HOSPITAL OBSERVATION PER HR: HCPCS

## 2023-08-18 PROCEDURE — 700102 HCHG RX REV CODE 250 W/ 637 OVERRIDE(OP): Performed by: NURSE PRACTITIONER

## 2023-08-18 PROCEDURE — 99232 SBSQ HOSP IP/OBS MODERATE 35: CPT | Performed by: INTERNAL MEDICINE

## 2023-08-18 PROCEDURE — 97163 PT EVAL HIGH COMPLEX 45 MIN: CPT

## 2023-08-18 PROCEDURE — 36415 COLL VENOUS BLD VENIPUNCTURE: CPT

## 2023-08-18 PROCEDURE — 700102 HCHG RX REV CODE 250 W/ 637 OVERRIDE(OP): Performed by: INTERNAL MEDICINE

## 2023-08-18 PROCEDURE — A9270 NON-COVERED ITEM OR SERVICE: HCPCS | Performed by: INTERNAL MEDICINE

## 2023-08-18 PROCEDURE — 80048 BASIC METABOLIC PNL TOTAL CA: CPT

## 2023-08-18 PROCEDURE — 700111 HCHG RX REV CODE 636 W/ 250 OVERRIDE (IP): Performed by: INTERNAL MEDICINE

## 2023-08-18 RX ADMIN — DABIGATRAN ETEXILATE MESYLATE 150 MG: 150 CAPSULE ORAL at 17:01

## 2023-08-18 RX ADMIN — METOPROLOL TARTRATE 25 MG: 25 TABLET, FILM COATED ORAL at 17:01

## 2023-08-18 RX ADMIN — METOPROLOL TARTRATE 25 MG: 25 TABLET, FILM COATED ORAL at 05:09

## 2023-08-18 RX ADMIN — SENNOSIDES AND DOCUSATE SODIUM 2 TABLET: 50; 8.6 TABLET ORAL at 17:07

## 2023-08-18 RX ADMIN — DABIGATRAN ETEXILATE MESYLATE 150 MG: 150 CAPSULE ORAL at 05:10

## 2023-08-18 RX ADMIN — OMEPRAZOLE 20 MG: 20 CAPSULE, DELAYED RELEASE ORAL at 17:01

## 2023-08-18 RX ADMIN — ONDANSETRON 4 MG: 4 TABLET, ORALLY DISINTEGRATING ORAL at 05:14

## 2023-08-18 RX ADMIN — OMEPRAZOLE 20 MG: 20 CAPSULE, DELAYED RELEASE ORAL at 05:09

## 2023-08-18 RX ADMIN — PROCHLORPERAZINE EDISYLATE 10 MG: 5 INJECTION, SOLUTION INTRAMUSCULAR; INTRAVENOUS at 07:44

## 2023-08-18 RX ADMIN — AMLODIPINE BESYLATE 10 MG: 10 TABLET ORAL at 05:09

## 2023-08-18 RX ADMIN — LEVOTHYROXINE SODIUM 100 MCG: 0.1 TABLET ORAL at 05:10

## 2023-08-18 RX ADMIN — Medication 1000 UNITS: at 05:09

## 2023-08-18 ASSESSMENT — GAIT ASSESSMENTS
GAIT LEVEL OF ASSIST: CONTACT GUARD ASSIST
DISTANCE (FEET): 50
ASSISTIVE DEVICE: 4 WHEEL WALKER

## 2023-08-18 ASSESSMENT — ENCOUNTER SYMPTOMS
SORE THROAT: 0
SINUS PAIN: 0
NECK PAIN: 0
BACK PAIN: 0
CONSTIPATION: 1
PALPITATIONS: 0
NAUSEA: 1
VOMITING: 0
SPUTUM PRODUCTION: 0
HEADACHES: 0
WEAKNESS: 0
MYALGIAS: 0
WHEEZING: 0
DEPRESSION: 1
WEIGHT LOSS: 1
FEVER: 0
COUGH: 0
FALLS: 1
EYES NEGATIVE: 1
DIZZINESS: 1
FLANK PAIN: 0
SHORTNESS OF BREATH: 0
NERVOUS/ANXIOUS: 1
DIAPHORESIS: 0
BLOOD IN STOOL: 0
ABDOMINAL PAIN: 0
ORTHOPNEA: 0
HEARTBURN: 1
CHILLS: 0

## 2023-08-18 ASSESSMENT — COGNITIVE AND FUNCTIONAL STATUS - GENERAL
WALKING IN HOSPITAL ROOM: A LITTLE
SUGGESTED CMS G CODE MODIFIER MOBILITY: CK
MOVING TO AND FROM BED TO CHAIR: A LITTLE
MOVING FROM LYING ON BACK TO SITTING ON SIDE OF FLAT BED: A LOT
STANDING UP FROM CHAIR USING ARMS: A LITTLE
MOBILITY SCORE: 15
TURNING FROM BACK TO SIDE WHILE IN FLAT BAD: A LITTLE
CLIMB 3 TO 5 STEPS WITH RAILING: TOTAL

## 2023-08-18 ASSESSMENT — PAIN DESCRIPTION - PAIN TYPE
TYPE: ACUTE PAIN
TYPE: ACUTE PAIN

## 2023-08-18 ASSESSMENT — FIBROSIS 4 INDEX: FIB4 SCORE: 1.91

## 2023-08-18 NOTE — CARE PLAN
The patient is Stable - Low risk of patient condition declining or worsening    Shift Goals  Clinical Goals: Pain control, monitor BP  Patient Goals: Reduce nausea, rest  Family Goals:  (not present)    Progress made toward(s) clinical / shift goals:    Problem: Knowledge Deficit - Standard  Goal: Patient and family/care givers will demonstrate understanding of plan of care, disease process/condition, diagnostic tests and medications  Description: Target End Date:  1-3 days or as soon as patient condition allows    Document in Patient Education    1.  Patient and family/caregiver oriented to unit, equipment, visitation policy and means for communicating concern  2.  Complete/review Learning Assessment  3.  Assess knowledge level of disease process/condition, treatment plan, diagnostic tests and medications  4.  Explain disease process/condition, treatment plan, diagnostic tests and medications  Outcome: Progressing  Note: Patient updated on POC, all questions answered at this time.     Problem: Fall Risk  Goal: Patient will remain free from falls  Description: Target End Date:  Prior to discharge or change in level of care    Document interventions on the [facility] Fall Risk Assessment    1.  Assess for fall risk factors  2.  Implement fall precautions  Outcome: Progressing  Note: Pt educated to utilize call light if she needs to ambulate or use the restroom. Patient is compliant with this. Bed is in lowest, locked position, bed alarm on. Treaded socks on patient.     Problem: Skin Integrity  Goal: Skin integrity is maintained or improved  Description: Target End Date:  Prior to discharge or change in level of care    Document interventions on Skin Risk/Vikram flowsheet groups and corresponding LDA    1.  Assess and monitor skin integrity, appearance and/or temperature  2.  Assess risk factors for impaired skin integrity and/or pressures ulcers  3.  Implement precautions to protect skin integrity in collaboration  with interdisciplinary team  4.  Implement pressure ulcer prevention protocol if at risk for skin breakdown  5.  Confirm wound care consult if at risk for skin breakdown  6.  Ensure patient use of pressure relieving devices  (Low air loss bed, waffle overlay, heel protectors, ROHO cushion, etc)  Outcome: Progressing  Note: Patient's skin remains free from pressure injuries. Patient turns self from side to side in bed and ambulates frequently throughout the day.

## 2023-08-18 NOTE — CARE PLAN
The patient is Watcher - Medium risk of patient condition declining or worsening    Shift Goals  Clinical Goals: Pain control and monitor BP  Patient Goals: Sleep and pain reduction  Family Goals:  (not present)    Progress made toward(s) clinical / shift goals:    Problem: Fall Risk  Goal: Patient will remain free from falls  Outcome: Progressing  Note: Call light and personal belongings placed within reach. Bed in lowest position. Treaded socks on patient. Patient advised to call for assistance.     Problem: Depression  Goal: Patient and family/caregiver will verbalize accurate information about at least two of the possible causes of depression, three-four of the signs and symptoms of depression  Outcome: Progressing  Note: Discussed symptoms of depression and allowed patient to express feelings.       Patient is not progressing towards the following goals:

## 2023-08-18 NOTE — PROGRESS NOTES
Bedside report received. Patient A/Ox 4. VS 's. No oxygen requirements currently. Telemetry monitoring SR. No complaints of pain at this time. POC discussed with patient. Pt verbalizes understanding. Call light and belongings within reach. Bed locked and lowest position, alarm and fall precautions in place.

## 2023-08-18 NOTE — PROGRESS NOTES
[facility] Medicine Daily Progress Note    Date of Service  2023    Chief Complaint  Felipa Chavez is a 82 y.o. female admitted 8/15/2023 with fall     Hospital Course  This is a 81 y/o F with PMHX of hypothyroidism, hypertension, CAD, JEREMIAH with intermittent use of CPAP and depression who presented to the ED on 8/15/2023 after having an episode of syncope and collapse.  [place], she suddenly felt nauseated and dizzy and then passed out.  She recalls lying on the floor feeling confused, unclear what happened. She denies experiencing anything other than dizziness or nausea prior to falling, denies lightheadedness, chest pain, palpitations, shortness of breath, or diaphoresis  She reports significant stress since her   recently and has lost 30 pounds over the last 6-month since he passed away.  She has since had to live with different daughters and grandkids, but this has not been working out, and she has been significantly depressed over feeling like she has no place to live. She has not seen a provider since last February or March when she lived in [place].  She is currently living locally with her granddaughter, but home life is incredibly stressful for her.  She reports of being compliant with her medications.    CT head  obtained in the ED is negative for any acute findings, but shows right sphenoid sinusitis.  CT of her cervical spine is negative for acute fracture or dislocation, but does show multilevel disc and facet degeneration.  Chest x-ray is negative  Patient admitted for further work up and treatment     Interval Problem Update  Patient seen and examind, afebrile, still feels weak and tired, has some nausea , denies chest pain  BP still not well controlled.adding amlodipine  Echo pending  : Patient resting in bed afebrile, BP not well controlled today, SBP of 180 increased amlodipine dose, also starting back metoprolol also for her A.fibb restarting pradaxa.  PT/OT eval    8/18: Patient seen and examined, feeling weka still with some nausea cont on Po and IV antiemetics,  POOJA slowly improving.   PT/OT lolis     I have discussed this patient's plan of care and discharge plan at IDT rounds today with Case Management, Nursing, Nursing leadership, and other members of the IDT team.    Consultants/Specialty  None     Code Status  Full Code    Disposition  The patient is not medically cleared for discharge to home or a post-acute facility.      I have placed the appropriate orders for post-discharge needs.    Review of Systems  Review of Systems   Constitutional:  Positive for malaise/fatigue and weight loss. Negative for chills, diaphoresis and fever.   HENT:  Negative for congestion, ear pain, sinus pain and sore throat.    Eyes: Negative.    Respiratory:  Negative for cough, sputum production, shortness of breath and wheezing.    Cardiovascular:  Positive for leg swelling (Reports intermittent leg swelling that resolves after she lays down for a while). Negative for chest pain, palpitations and orthopnea.   Gastrointestinal:  Positive for constipation (Chronic), heartburn (Chronic) and nausea. Negative for abdominal pain, blood in stool and vomiting.   Genitourinary:  Negative for dysuria, flank pain, frequency, hematuria and urgency.   Musculoskeletal:  Positive for falls (Positive for syncope and collapse). Negative for back pain, myalgias and neck pain.   Skin:  Negative for itching and rash.   Neurological:  Positive for dizziness. Negative for weakness and headaches.   Psychiatric/Behavioral:  Positive for depression. The patient is nervous/anxious.    All other systems reviewed and are negative.       Physical Exam  Temp:  [36.6 °C (97.9 °F)-37.3 °C (99.1 °F)] 37.3 °C (99.1 °F)  Pulse:  [70-89] 74  Resp:  [17-18] 17  BP: (110-194)/(70-91) 135/73  SpO2:  [89 %-93 %] 93 %    Physical Exam  Vitals and nursing note reviewed.   Constitutional:       General: She is not in acute  distress.     Appearance: She is not ill-appearing.      Comments: Frail   HENT:      Head: Normocephalic and atraumatic.      Nose: Nose normal. No congestion or rhinorrhea.      Mouth/Throat:      Mouth: Mucous membranes are dry.      Pharynx: Oropharynx is clear.   Eyes:      Extraocular Movements: Extraocular movements intact.      Pupils: Pupils are equal, round, and reactive to light.   Cardiovascular:      Rate and Rhythm: Normal rate and regular rhythm.      Pulses: Normal pulses.      Heart sounds: No murmur heard.     No friction rub. No gallop.   Pulmonary:      Effort: Pulmonary effort is normal. No respiratory distress.      Breath sounds: No wheezing, rhonchi or rales.   Abdominal:      General: Abdomen is flat. There is no distension.      Palpations: Abdomen is soft.      Tenderness: There is no abdominal tenderness. There is no guarding or rebound.   Musculoskeletal:         General: Normal range of motion.      Cervical back: Normal range of motion.      Right lower leg: No edema.      Left lower leg: No edema.   Skin:     General: Skin is warm and dry.      Capillary Refill: Capillary refill takes less than 2 seconds.      Coloration: Skin is not jaundiced.      Findings: No bruising or lesion.   Neurological:      Mental Status: She is alert and oriented to person, place, and time.   Psychiatric:         Mood and Affect: Mood normal.         Behavior: Behavior normal.         Fluids  No intake or output data in the 24 hours ending 08/18/23 1337      Laboratory  Recent Labs     08/16/23 0206 08/17/23 0244 08/18/23 0253   WBC 8.7 6.0 5.3   RBC 3.89* 3.98* 4.11*   HEMOGLOBIN 12.0 12.4 12.8   HEMATOCRIT 37.6 38.2 38.3   MCV 96.7 96.0 93.2   MCH 30.8 31.2 31.1   MCHC 31.9* 32.5 33.4   RDW 48.7 48.1 45.7   PLATELETCT 239 242 238   MPV 9.6 9.7 9.1     Recent Labs     08/16/23 0206 08/17/23  0244 08/18/23  0253   SODIUM 136 133* 132*   POTASSIUM 4.8 4.5 4.2   CHLORIDE 100 97 97   CO2 27 27 27    GLUCOSE 97 115* 122*   BUN 27* 23* 21   CREATININE 1.11 1.00 1.06   CALCIUM 8.8 8.7 8.7                   Imaging  EC-ECHOCARDIOGRAM COMPLETE W/O CONT   Final Result      DX-CHEST-PORTABLE (1 VIEW)   Final Result      No acute cardiopulmonary abnormality.      CT-CSPINE WITHOUT PLUS RECONS   Final Result      No acute fracture or dislocation of the cervical spine.      Multilevel disc and facet degeneration.      CT-HEAD W/O   Final Result      1.  Chronic microvascular ischemic type changes.   2.  No acute intracranial abnormality.   3.  Right sphenoid sinusitis              Assessment/Plan  * Syncope and collapse- (present on admission)  Assessment & Plan  Episode of syncope and collapse while at the bank today.  2-month history of near syncope.  All episodes associated with nausea  -continue close monitoring on telemetry for any arrhythmias and/or decompensation  Echo has been ordered and pending     Atrial fibrillation (HCC)  Assessment & Plan  Restarting back her metoprolol  Also restarting back her pradaxa     JEREMIAH (obstructive sleep apnea)- (present on admission)  Assessment & Plan  Noncompliant with CPAP, as she she says she is unable to sleep with the mask over her face.  She reports waking with extreme fatigue and is tired throughout the day.  Prior to the CPAP, she was using oxygen at night.  Request not to use CPAP here as well  - Supplemental oxygen 2 L at at bedtime  - Encourage follow-up for CPAP adjustment    Hyperglycemia- (present on admission)  Assessment & Plan  Blood sugar mildly elevated.  Reports intermittent peripheral neuropathy, which she was suffering from during my exam  -A1c    Situational stress- (present on admission)  Assessment & Plan  Patient's   recently, and she has not really had a place to live since, and has been living with various daughters and grandchildren.  She has had weight loss of about 30 pounds over the last 6 months, has had months of nausea and now with  syncope.  Stress may be contributing to her current symptoms.  -Patient would benefit from case management intervention  -Ativan p.o. as needed     Hypothyroidism- (present on admission)  Assessment & Plan  - Continue Synthroid  -TSH levl    GERD (gastroesophageal reflux disease)- (present on admission)  Assessment & Plan  - Continue Protonix    HTN (hypertension)- (present on admission)  Assessment & Plan  BP still not well controlled   Increased amlodipine and starting back metoprolol   Close monitoring and adjust as needed           VTE prophylaxis:    therapeutic anticoagulation with pradaxa 150 mg BID      I have performed a physical exam and reviewed and updated ROS and Plan today (8/18/2023). In review of yesterday's note (8/17/2023), there are no changes except as documented above.

## 2023-08-18 NOTE — PROGRESS NOTES
Bedside report received from night RN, pt care assumed, assessment completed. Pt is A&Ox4, pain 0/10, nauseous, SR on the monitor. Updated on POC, questions answered. Bed in lowest, locked position, treaded socks on, call light and belongings within reach.

## 2023-08-19 ENCOUNTER — APPOINTMENT (OUTPATIENT)
Dept: RADIOLOGY | Facility: MEDICAL CENTER | Age: 83
End: 2023-08-19
Attending: INTERNAL MEDICINE
Payer: MEDICARE

## 2023-08-19 LAB
ANION GAP SERPL CALC-SCNC: 11 MMOL/L (ref 7–16)
BUN SERPL-MCNC: 26 MG/DL (ref 8–22)
CALCIUM SERPL-MCNC: 9.1 MG/DL (ref 8.5–10.5)
CHLORIDE SERPL-SCNC: 96 MMOL/L (ref 96–112)
CO2 SERPL-SCNC: 23 MMOL/L (ref 20–33)
CREAT SERPL-MCNC: 0.84 MG/DL (ref 0.5–1.4)
ERYTHROCYTE [DISTWIDTH] IN BLOOD BY AUTOMATED COUNT: 46.3 FL (ref 35.9–50)
GFR SERPLBLD CREATININE-BSD FMLA CKD-EPI: 68 ML/MIN/1.73 M 2
GLUCOSE SERPL-MCNC: 107 MG/DL (ref 65–99)
HCT VFR BLD AUTO: 38.8 % (ref 37–47)
HGB BLD-MCNC: 12.9 G/DL (ref 12–16)
MCH RBC QN AUTO: 31.3 PG (ref 27–33)
MCHC RBC AUTO-ENTMCNC: 33.2 G/DL (ref 32.2–35.5)
MCV RBC AUTO: 94.2 FL (ref 81.4–97.8)
PLATELET # BLD AUTO: 262 K/UL (ref 164–446)
PMV BLD AUTO: 9.5 FL (ref 9–12.9)
POTASSIUM SERPL-SCNC: 4.3 MMOL/L (ref 3.6–5.5)
RBC # BLD AUTO: 4.12 M/UL (ref 4.2–5.4)
SODIUM SERPL-SCNC: 130 MMOL/L (ref 135–145)
WBC # BLD AUTO: 5.6 K/UL (ref 4.8–10.8)

## 2023-08-19 PROCEDURE — 36415 COLL VENOUS BLD VENIPUNCTURE: CPT

## 2023-08-19 PROCEDURE — 85027 COMPLETE CBC AUTOMATED: CPT

## 2023-08-19 PROCEDURE — 74018 RADEX ABDOMEN 1 VIEW: CPT

## 2023-08-19 PROCEDURE — 80048 BASIC METABOLIC PNL TOTAL CA: CPT

## 2023-08-19 PROCEDURE — 99232 SBSQ HOSP IP/OBS MODERATE 35: CPT | Performed by: INTERNAL MEDICINE

## 2023-08-19 PROCEDURE — 96376 TX/PRO/DX INJ SAME DRUG ADON: CPT

## 2023-08-19 PROCEDURE — G0378 HOSPITAL OBSERVATION PER HR: HCPCS

## 2023-08-19 PROCEDURE — A9270 NON-COVERED ITEM OR SERVICE: HCPCS | Performed by: INTERNAL MEDICINE

## 2023-08-19 PROCEDURE — 700102 HCHG RX REV CODE 250 W/ 637 OVERRIDE(OP): Performed by: NURSE PRACTITIONER

## 2023-08-19 PROCEDURE — 700102 HCHG RX REV CODE 250 W/ 637 OVERRIDE(OP): Performed by: INTERNAL MEDICINE

## 2023-08-19 PROCEDURE — 700111 HCHG RX REV CODE 636 W/ 250 OVERRIDE (IP): Performed by: NURSE PRACTITIONER

## 2023-08-19 PROCEDURE — 700111 HCHG RX REV CODE 636 W/ 250 OVERRIDE (IP): Performed by: INTERNAL MEDICINE

## 2023-08-19 PROCEDURE — A9270 NON-COVERED ITEM OR SERVICE: HCPCS | Performed by: NURSE PRACTITIONER

## 2023-08-19 RX ORDER — AMOXICILLIN 250 MG
2 CAPSULE ORAL 2 TIMES DAILY
Status: DISCONTINUED | OUTPATIENT
Start: 2023-08-19 | End: 2023-09-02 | Stop reason: HOSPADM

## 2023-08-19 RX ORDER — POLYETHYLENE GLYCOL 3350 17 G/17G
1 POWDER, FOR SOLUTION ORAL
Status: DISCONTINUED | OUTPATIENT
Start: 2023-08-19 | End: 2023-08-26

## 2023-08-19 RX ORDER — BISACODYL 10 MG
10 SUPPOSITORY, RECTAL RECTAL DAILY
Status: DISCONTINUED | OUTPATIENT
Start: 2023-08-19 | End: 2023-08-19

## 2023-08-19 RX ADMIN — AMLODIPINE BESYLATE 10 MG: 10 TABLET ORAL at 04:29

## 2023-08-19 RX ADMIN — LEVOTHYROXINE SODIUM 100 MCG: 0.1 TABLET ORAL at 04:30

## 2023-08-19 RX ADMIN — SENNOSIDES AND DOCUSATE SODIUM 2 TABLET: 50; 8.6 TABLET ORAL at 16:43

## 2023-08-19 RX ADMIN — METOPROLOL TARTRATE 25 MG: 25 TABLET, FILM COATED ORAL at 04:29

## 2023-08-19 RX ADMIN — Medication 1000 UNITS: at 04:30

## 2023-08-19 RX ADMIN — SENNOSIDES AND DOCUSATE SODIUM 2 TABLET: 50; 8.6 TABLET ORAL at 09:34

## 2023-08-19 RX ADMIN — DABIGATRAN ETEXILATE MESYLATE 150 MG: 150 CAPSULE ORAL at 16:43

## 2023-08-19 RX ADMIN — ONDANSETRON 4 MG: 4 TABLET, ORALLY DISINTEGRATING ORAL at 09:36

## 2023-08-19 RX ADMIN — POLYETHYLENE GLYCOL 3350 1 PACKET: 17 POWDER, FOR SOLUTION ORAL at 12:40

## 2023-08-19 RX ADMIN — METOPROLOL TARTRATE 25 MG: 25 TABLET, FILM COATED ORAL at 16:43

## 2023-08-19 RX ADMIN — PROCHLORPERAZINE EDISYLATE 10 MG: 5 INJECTION, SOLUTION INTRAMUSCULAR; INTRAVENOUS at 15:42

## 2023-08-19 RX ADMIN — DABIGATRAN ETEXILATE MESYLATE 150 MG: 150 CAPSULE ORAL at 04:30

## 2023-08-19 RX ADMIN — OMEPRAZOLE 20 MG: 20 CAPSULE, DELAYED RELEASE ORAL at 04:30

## 2023-08-19 RX ADMIN — OMEPRAZOLE 20 MG: 20 CAPSULE, DELAYED RELEASE ORAL at 16:43

## 2023-08-19 ASSESSMENT — ENCOUNTER SYMPTOMS
FLANK PAIN: 0
WEAKNESS: 0
MYALGIAS: 0
VOMITING: 0
SHORTNESS OF BREATH: 0
HEADACHES: 0
DEPRESSION: 1
ORTHOPNEA: 0
BACK PAIN: 0
SPUTUM PRODUCTION: 0
BLOOD IN STOOL: 0
WHEEZING: 0
NECK PAIN: 0
EYES NEGATIVE: 1
PALPITATIONS: 0
COUGH: 0
DIZZINESS: 1
SORE THROAT: 0
HEARTBURN: 1
NAUSEA: 1
WEIGHT LOSS: 1
CHILLS: 0
NERVOUS/ANXIOUS: 1
CONSTIPATION: 1
ABDOMINAL PAIN: 0
SINUS PAIN: 0
FALLS: 1
DIAPHORESIS: 0
FEVER: 0

## 2023-08-19 ASSESSMENT — PAIN DESCRIPTION - PAIN TYPE: TYPE: ACUTE PAIN

## 2023-08-19 ASSESSMENT — FIBROSIS 4 INDEX: FIB4 SCORE: 1.74

## 2023-08-19 NOTE — DISCHARGE PLANNING
RNCM met with patient to get choice for SNF. Patient stated that she has Medicare and [facility]. RNCM will attempt to contact [facility] tomorrow (8/20/23) to see about insurance what type she does have. Also to see who [facility] is contracted with here in [place]. It may have to wait until Monday (8/21/23) if RNCM is unable to contact [facility] on a Sunday.

## 2023-08-19 NOTE — CARE PLAN
The patient is Watcher - Medium risk of patient condition declining or worsening    Shift Goals  Clinical Goals: Safe ambulation, reduce nausea  Patient Goals: Sleep and reduce nausea  Family Goals:  (Not present)    Progress made toward(s) clinical / shift goals:    Problem: Knowledge Deficit - Standard  Goal: Patient and family/care givers will demonstrate understanding of plan of care, disease process/condition, diagnostic tests and medications  Outcome: Progressing  Note: Discussed treatment plan and medications with patient. Educated patient on the importance of safety and mobility. Informed patient on POC. Patient verbalized understanding.     Problem: Fall Risk  Goal: Patient will remain free from falls  Outcome: Progressing  Note: Call light and personal belongings placed within reach. Bed in lowest position. Treaded socks on patient. Bed alarm on. Patient advised to call for assistance.       Patient is not progressing towards the following goals:

## 2023-08-19 NOTE — PROGRESS NOTES
Bedside report received. Patient A/Ox 4. VS -130's . No oxygen requirements at this time. No complaints of pain currently. POC discussed with patient. Pt verbalizes understanding. Call light and belongings within reach. Bed locked and lowest position, alarm and fall precautions in place.

## 2023-08-19 NOTE — PROGRESS NOTES
[facility] Medicine Daily Progress Note    Date of Service  2023    Chief Complaint  Felipa Chavez is a 82 y.o. female admitted 8/15/2023 with fall     Hospital Course  This is a 83 y/o F with PMHX of hypothyroidism, hypertension, CAD, JEREMIAH with intermittent use of CPAP and depression who presented to the ED on 8/15/2023 after having an episode of syncope and collapse.  she was at the bank, she suddenly felt nauseated and dizzy and then passed out.  She recalls lying on the floor feeling confused, unclear what happened. She denies experiencing anything other than dizziness or nausea prior to falling, denies lightheadedness, chest pain, palpitations, shortness of breath, or diaphoresis  She reports significant stress since her   recently and has lost 30 pounds over the last 6-month since he passed away.  She has since had to live with different daughters and grandkids, but this has not been working out, and she has been significantly depressed over feeling like she has no place to live. She has not seen a provider since last February or March when she lived in [place].  She is currently living locally with her granddaughter, but home life is incredibly stressful for her.  She reports of being compliant with her medications.    CT head  obtained in the ED is negative for any acute findings, but shows right sphenoid sinusitis.  CT of her cervical spine is negative for acute fracture or dislocation, but does show multilevel disc and facet degeneration.  Chest x-ray is negative  Patient admitted for further work up and treatment     Interval Problem Update  Patient seen and examind, afebrile, still feels weak and tired, has some nausea , denies chest pain  BP still not well controlled.adding amlodipine  Echo pending  : Patient resting in bed afebrile, BP not well controlled today, SBP of 180 increased amlodipine dose, also starting back metoprolol also for her A.fibb restarting pradaxa.  PT/OT eval    8/18: Patient seen and examined, feeling weka still with some nausea cont on Po and IV antiemetics,  POOJA slowly improving.   PT/OT lolis   8/19: Patient seen and examined, was complaining of abdominal pain, I have ordered a abdominal xray which was personally reviewed and shows stool but no obstruction  Will start on bowel protocol   Will need placement     I have discussed this patient's plan of care and discharge plan at IDT rounds today with Case Management, Nursing, Nursing leadership, and other members of the IDT team.    Consultants/Specialty  None     Code Status  Full Code    Disposition  The patient is not medically cleared for discharge to home or a post-acute facility.      I have placed the appropriate orders for post-discharge needs.    Review of Systems  Review of Systems   Constitutional:  Positive for malaise/fatigue and weight loss. Negative for chills, diaphoresis and fever.   HENT:  Negative for congestion, ear pain, sinus pain and sore throat.    Eyes: Negative.    Respiratory:  Negative for cough, sputum production, shortness of breath and wheezing.    Cardiovascular:  Positive for leg swelling (Reports intermittent leg swelling that resolves after she lays down for a while). Negative for chest pain, palpitations and orthopnea.   Gastrointestinal:  Positive for constipation (Chronic), heartburn (Chronic) and nausea. Negative for abdominal pain, blood in stool and vomiting.   Genitourinary:  Negative for dysuria, flank pain, frequency, hematuria and urgency.   Musculoskeletal:  Positive for falls (Positive for syncope and collapse). Negative for back pain, myalgias and neck pain.   Skin:  Negative for itching and rash.   Neurological:  Positive for dizziness. Negative for weakness and headaches.   Psychiatric/Behavioral:  Positive for depression. The patient is nervous/anxious.    All other systems reviewed and are negative.       Physical Exam  Temp:  [36.3 °C (97.3 °F)-37.3 °C (99.1 °F)] 37.2  °C (99 °F)  Pulse:  [76-89] 83  Resp:  [18-20] 20  BP: (112-171)/(67-77) 135/70  SpO2:  [89 %-94 %] 92 %    Physical Exam  Vitals and nursing note reviewed.   Constitutional:       General: She is not in acute distress.     Appearance: She is not ill-appearing.      Comments: Frail   HENT:      Head: Normocephalic and atraumatic.      Nose: Nose normal. No congestion or rhinorrhea.      Mouth/Throat:      Mouth: Mucous membranes are dry.      Pharynx: Oropharynx is clear.   Eyes:      Extraocular Movements: Extraocular movements intact.      Pupils: Pupils are equal, round, and reactive to light.   Cardiovascular:      Rate and Rhythm: Normal rate and regular rhythm.      Pulses: Normal pulses.      Heart sounds: No murmur heard.     No friction rub. No gallop.   Pulmonary:      Effort: Pulmonary effort is normal. No respiratory distress.      Breath sounds: No wheezing, rhonchi or rales.   Abdominal:      General: Abdomen is flat. There is no distension.      Palpations: Abdomen is soft.      Tenderness: There is no abdominal tenderness. There is no guarding or rebound.   Musculoskeletal:         General: Normal range of motion.      Cervical back: Normal range of motion.      Right lower leg: No edema.      Left lower leg: No edema.   Skin:     General: Skin is warm and dry.      Capillary Refill: Capillary refill takes less than 2 seconds.      Coloration: Skin is not jaundiced.      Findings: No bruising or lesion.   Neurological:      Mental Status: She is alert and oriented to person, place, and time.   Psychiatric:         Mood and Affect: Mood normal.         Behavior: Behavior normal.         Fluids    Intake/Output Summary (Last 24 hours) at 8/19/2023 1432  Last data filed at 8/19/2023 1115  Gross per 24 hour   Intake 120 ml   Output --   Net 120 ml         Laboratory  Recent Labs     08/17/23  0244 08/18/23  0253 08/19/23  0317   WBC 6.0 5.3 5.6   RBC 3.98* 4.11* 4.12*   HEMOGLOBIN 12.4 12.8 12.9    HEMATOCRIT 38.2 38.3 38.8   MCV 96.0 93.2 94.2   MCH 31.2 31.1 31.3   MCHC 32.5 33.4 33.2   RDW 48.1 45.7 46.3   PLATELETCT 242 238 262   MPV 9.7 9.1 9.5       Recent Labs     08/17/23  0244 08/18/23  0253 08/19/23  0317   SODIUM 133* 132* 130*   POTASSIUM 4.5 4.2 4.3   CHLORIDE 97 97 96   CO2 27 27 23   GLUCOSE 115* 122* 107*   BUN 23* 21 26*   CREATININE 1.00 1.06 0.84   CALCIUM 8.7 8.7 9.1                     Imaging  [id number] VIEW   Final Result      No acute process. Large amount of stool.      EC-ECHOCARDIOGRAM COMPLETE W/O CONT   Final Result      DX-CHEST-PORTABLE (1 VIEW)   Final Result      No acute cardiopulmonary abnormality.      CT-CSPINE WITHOUT PLUS RECONS   Final Result      No acute fracture or dislocation of the cervical spine.      Multilevel disc and facet degeneration.      CT-HEAD W/O   Final Result      1.  Chronic microvascular ischemic type changes.   2.  No acute intracranial abnormality.   3.  Right sphenoid sinusitis                Assessment/Plan  * Syncope and collapse- (present on admission)  Assessment & Plan  Episode of syncope and collapse [place] at the bank today.  2-month history of near syncope.  All episodes associated with nausea  -continue close monitoring on telemetry for any arrhythmias and/or decompensation  Echo has been ordered and pending     Atrial fibrillation (HCC)  Assessment & Plan  Restarting back her metoprolol  Also restarting back her pradaxa     JEREMIAH (obstructive sleep apnea)- (present on admission)  Assessment & Plan  Noncompliant with CPAP, as she she says she is unable to sleep with the mask over her face.  She reports waking with extreme fatigue and is tired throughout the day.  Prior to the CPAP, she was using oxygen at night.  Request not to use CPAP here as well  - Supplemental oxygen 2 L at at bedtime  - Encourage follow-up for CPAP adjustment    Hyperglycemia- (present on admission)  Assessment & Plan  Blood sugar mildly elevated.  Reports  intermittent peripheral neuropathy, which she was suffering from during my exam  -A1c    Situational stress- (present on admission)  Assessment & Plan  Patient's   recently, and she has not really had a place to live since, and has been living with various daughters and grandchildren.  She has had weight loss of about 30 pounds over the last 6 months, has had months of nausea and now with syncope.  Stress may be contributing to her current symptoms.  -Patient would benefit from case management intervention  -Ativan p.o. as needed     Hypothyroidism- (present on admission)  Assessment & Plan  - Continue Synthroid  -TSH levl    GERD (gastroesophageal reflux disease)- (present on admission)  Assessment & Plan  - Continue Protonix    HTN (hypertension)- (present on admission)  Assessment & Plan  BP still not well controlled   Increased amlodipine and starting back metoprolol   Close monitoring and adjust as needed           VTE prophylaxis:    therapeutic anticoagulation with pradaxa 150 mg BID      I have performed a physical exam and reviewed and updated ROS and Plan today (2023). In review of yesterday's note (2023), there are no changes except as documented above.

## 2023-08-20 PROCEDURE — 700102 HCHG RX REV CODE 250 W/ 637 OVERRIDE(OP): Performed by: NURSE PRACTITIONER

## 2023-08-20 PROCEDURE — G0378 HOSPITAL OBSERVATION PER HR: HCPCS

## 2023-08-20 PROCEDURE — 97535 SELF CARE MNGMENT TRAINING: CPT

## 2023-08-20 PROCEDURE — 700102 HCHG RX REV CODE 250 W/ 637 OVERRIDE(OP): Performed by: INTERNAL MEDICINE

## 2023-08-20 PROCEDURE — A9270 NON-COVERED ITEM OR SERVICE: HCPCS | Performed by: INTERNAL MEDICINE

## 2023-08-20 PROCEDURE — 97166 OT EVAL MOD COMPLEX 45 MIN: CPT

## 2023-08-20 PROCEDURE — 700111 HCHG RX REV CODE 636 W/ 250 OVERRIDE (IP): Performed by: NURSE PRACTITIONER

## 2023-08-20 PROCEDURE — A9270 NON-COVERED ITEM OR SERVICE: HCPCS | Performed by: NURSE PRACTITIONER

## 2023-08-20 PROCEDURE — 99232 SBSQ HOSP IP/OBS MODERATE 35: CPT | Performed by: INTERNAL MEDICINE

## 2023-08-20 RX ORDER — ENEMA 19; 7 G/133ML; G/133ML
1 ENEMA RECTAL ONCE
Status: ACTIVE | OUTPATIENT
Start: 2023-08-20 | End: 2023-08-21

## 2023-08-20 RX ORDER — LACTULOSE 20 G/30ML
30 SOLUTION ORAL 3 TIMES DAILY
Status: DISCONTINUED | OUTPATIENT
Start: 2023-08-20 | End: 2023-08-26

## 2023-08-20 RX ADMIN — LACTULOSE 30 ML: 20 SOLUTION ORAL at 16:35

## 2023-08-20 RX ADMIN — OMEPRAZOLE 20 MG: 20 CAPSULE, DELAYED RELEASE ORAL at 04:55

## 2023-08-20 RX ADMIN — LACTULOSE 30 ML: 20 SOLUTION ORAL at 11:19

## 2023-08-20 RX ADMIN — METOPROLOL TARTRATE 25 MG: 25 TABLET, FILM COATED ORAL at 04:54

## 2023-08-20 RX ADMIN — SENNOSIDES AND DOCUSATE SODIUM 2 TABLET: 50; 8.6 TABLET ORAL at 04:55

## 2023-08-20 RX ADMIN — SENNOSIDES AND DOCUSATE SODIUM 2 TABLET: 50; 8.6 TABLET ORAL at 16:35

## 2023-08-20 RX ADMIN — MAGNESIUM HYDROXIDE 30 ML: 1200 LIQUID ORAL at 16:56

## 2023-08-20 RX ADMIN — ACETAMINOPHEN 650 MG: 325 TABLET, FILM COATED ORAL at 16:49

## 2023-08-20 RX ADMIN — LEVOTHYROXINE SODIUM 100 MCG: 0.1 TABLET ORAL at 04:55

## 2023-08-20 RX ADMIN — AMLODIPINE BESYLATE 10 MG: 10 TABLET ORAL at 04:55

## 2023-08-20 RX ADMIN — Medication 1000 UNITS: at 04:55

## 2023-08-20 RX ADMIN — BISACODYL 10 MG: 10 SUPPOSITORY RECTAL at 00:52

## 2023-08-20 RX ADMIN — DABIGATRAN ETEXILATE MESYLATE 150 MG: 150 CAPSULE ORAL at 04:55

## 2023-08-20 RX ADMIN — ONDANSETRON 4 MG: 4 TABLET, ORALLY DISINTEGRATING ORAL at 16:35

## 2023-08-20 RX ADMIN — METOPROLOL TARTRATE 25 MG: 25 TABLET, FILM COATED ORAL at 16:35

## 2023-08-20 RX ADMIN — DABIGATRAN ETEXILATE MESYLATE 150 MG: 150 CAPSULE ORAL at 16:35

## 2023-08-20 ASSESSMENT — ENCOUNTER SYMPTOMS
HEADACHES: 0
ORTHOPNEA: 0
NAUSEA: 1
EYES NEGATIVE: 1
COUGH: 0
WEIGHT LOSS: 1
CONSTIPATION: 1
WHEEZING: 0
BACK PAIN: 0
BLOOD IN STOOL: 0
SPUTUM PRODUCTION: 0
FEVER: 0
PALPITATIONS: 0
DEPRESSION: 1
FLANK PAIN: 0
HEARTBURN: 1
NECK PAIN: 0
SHORTNESS OF BREATH: 0
WEAKNESS: 0
CHILLS: 0
VOMITING: 0
SORE THROAT: 0
DIZZINESS: 1
NERVOUS/ANXIOUS: 1
MYALGIAS: 0
FALLS: 1
SINUS PAIN: 0
DIAPHORESIS: 0
ABDOMINAL PAIN: 0

## 2023-08-20 ASSESSMENT — COGNITIVE AND FUNCTIONAL STATUS - GENERAL
TOILETING: A LOT
HELP NEEDED FOR BATHING: A LITTLE
DRESSING REGULAR UPPER BODY CLOTHING: A LITTLE
DRESSING REGULAR LOWER BODY CLOTHING: A LOT
DAILY ACTIVITIY SCORE: 17
PERSONAL GROOMING: A LITTLE
SUGGESTED CMS G CODE MODIFIER DAILY ACTIVITY: CK

## 2023-08-20 ASSESSMENT — FIBROSIS 4 INDEX: FIB4 SCORE: 1.74

## 2023-08-20 ASSESSMENT — PAIN DESCRIPTION - PAIN TYPE: TYPE: ACUTE PAIN

## 2023-08-20 ASSESSMENT — ACTIVITIES OF DAILY LIVING (ADL): TOILETING: INDEPENDENT

## 2023-08-20 NOTE — PROGRESS NOTES
[facility] Medicine Daily Progress Note    Date of Service  2023    Chief Complaint  Felipa Chavez is a 82 y.o. female admitted 8/15/2023 with fall     Hospital Course  This is a 83 y/o F with PMHX of hypothyroidism, hypertension, CAD, JEREMIAH with intermittent use of CPAP and depression who presented to the ED on 8/15/2023 after having an episode of syncope and collapse.  she was at the bank, she suddenly felt nauseated and dizzy and then passed out.  She recalls lying on the floor feeling confused, unclear what happened. She denies experiencing anything other than dizziness or nausea prior to falling, denies lightheadedness, chest pain, palpitations, shortness of breath, or diaphoresis  She reports significant stress since her   recently and has lost 30 pounds over the last 6-month since he passed away.  She has since had to live with different daughters and grandkids, but this has not been working out, and she has been significantly depressed over feeling like she has no place to live. She has not seen a provider since last February or March when she lived in [place].  She is currently living locally with her granddaughter, but home life is incredibly stressful for her.  She reports of being compliant with her medications.    CT head  obtained in the ED is negative for any acute findings, but shows right sphenoid sinusitis.  CT of her cervical spine is negative for acute fracture or dislocation, but does show multilevel disc and facet degeneration.  Chest x-ray is negative  Patient admitted for further work up and treatment     Interval Problem Update  Patient seen and examind, afebrile, still feels weak and tired, has some nausea , denies chest pain  BP still not well controlled.adding amlodipine  Echo pending  : Patient resting in bed afebrile, BP not well controlled today, SBP of 180 increased amlodipine dose, also starting back metoprolol also for her A.fibb restarting pradaxa.  PT/OT eval    8/18: Patient seen and examined, feeling weka still with some nausea cont on Po and IV antiemetics,  POOJA slowly improving.   PT/OT eval   8/19: Patient seen and examined, was complaining of abdominal pain, I have ordered a abdominal xray which was personally reviewed and shows stool but no obstruction  Will start on bowel protocol   Will need placement   8/20: Patient resting in bed afebrile, still having some abdominal cramp, no BM still cnstipated will add lactulose today   PT/OT will need placement   I have discussed this patient's plan of care and discharge plan at IDT rounds today with Case Management, Nursing, Nursing leadership, and other members of the IDT team.    Consultants/Specialty  None     Code Status  Full Code    Disposition  The patient is medically cleared for discharge to home or a post-acute facility.      I have placed the appropriate orders for post-discharge needs.    Review of Systems  Review of Systems   Constitutional:  Positive for malaise/fatigue and weight loss. Negative for chills, diaphoresis and fever.   HENT:  Negative for congestion, ear pain, sinus pain and sore throat.    Eyes: Negative.    Respiratory:  Negative for cough, sputum production, shortness of breath and wheezing.    Cardiovascular:  Positive for leg swelling (Reports intermittent leg swelling that resolves after she lays down for a while). Negative for chest pain, palpitations and orthopnea.   Gastrointestinal:  Positive for constipation (Chronic), heartburn (Chronic) and nausea. Negative for abdominal pain, blood in stool and vomiting.   Genitourinary:  Negative for dysuria, flank pain, frequency, hematuria and urgency.   Musculoskeletal:  Positive for falls (Positive for syncope and collapse). Negative for back pain, myalgias and neck pain.   Skin:  Negative for itching and rash.   Neurological:  Positive for dizziness. Negative for weakness and headaches.   Psychiatric/Behavioral:  Positive for depression. The  patient is nervous/anxious.    All other systems reviewed and are negative.       Physical Exam  Temp:  [36.5 °C (97.7 °F)-37.2 °C (99 °F)] 36.8 °C (98.2 °F)  Pulse:  [71-97] 78  Resp:  [14-18] 18  BP: ()/(58-83) 95/58  SpO2:  [90 %-95 %] 93 %    Physical Exam  Vitals and nursing note reviewed.   Constitutional:       General: She is not in acute distress.     Appearance: She is not ill-appearing.      Comments: Frail   HENT:      Head: Normocephalic and atraumatic.      Nose: Nose normal. No congestion or rhinorrhea.      Mouth/Throat:      Mouth: Mucous membranes are dry.      Pharynx: Oropharynx is clear.   Eyes:      Extraocular Movements: Extraocular movements intact.      Pupils: Pupils are equal, round, and reactive to light.   Cardiovascular:      Rate and Rhythm: Normal rate and regular rhythm.      Pulses: Normal pulses.      Heart sounds: No murmur heard.     No friction rub. No gallop.   Pulmonary:      Effort: Pulmonary effort is normal. No respiratory distress.      Breath sounds: No wheezing, rhonchi or rales.   Abdominal:      General: Abdomen is flat. There is no distension.      Palpations: Abdomen is soft.      Tenderness: There is no abdominal tenderness. There is no guarding or rebound.   Musculoskeletal:         General: Normal range of motion.      Cervical back: Normal range of motion.      Right lower leg: No edema.      Left lower leg: No edema.   Skin:     General: Skin is warm and dry.      Capillary Refill: Capillary refill takes less than 2 seconds.      Coloration: Skin is not jaundiced.      Findings: No bruising or lesion.   Neurological:      Mental Status: She is alert and oriented to person, place, and time.   Psychiatric:         Mood and Affect: Mood normal.         Behavior: Behavior normal.         Fluids    Intake/Output Summary (Last 24 hours) at 8/20/2023 1428  Last data filed at 8/20/2023 0600  Gross per 24 hour   Intake 10 ml   Output 1 ml   Net 9 ml            Laboratory  Recent Labs     08/18/23 0253 08/19/23 0317   WBC 5.3 5.6   RBC 4.11* 4.12*   HEMOGLOBIN 12.8 12.9   HEMATOCRIT 38.3 38.8   MCV 93.2 94.2   MCH 31.1 31.3   MCHC 33.4 33.2   RDW 45.7 46.3   PLATELETCT 238 262   MPV 9.1 9.5       Recent Labs     08/18/23 0253 08/19/23 0317   SODIUM 132* 130*   POTASSIUM 4.2 4.3   CHLORIDE 97 96   CO2 27 23   GLUCOSE 122* 107*   BUN 21 26*   CREATININE 1.06 0.84   CALCIUM 8.7 9.1                     Imaging  [id number]-[id number]-2 VIEW   Final Result      No acute process. Large amount of stool.      EC-ECHOCARDIOGRAM COMPLETE W/O CONT   Final Result      DX-CHEST-PORTABLE (1 VIEW)   Final Result      No acute cardiopulmonary abnormality.      CT-CSPINE WITHOUT PLUS RECONS   Final Result      No acute fracture or dislocation of the cervical spine.      Multilevel disc and facet degeneration.      CT-HEAD W/O   Final Result      1.  Chronic microvascular ischemic type changes.   2.  No acute intracranial abnormality.   3.  Right sphenoid sinusitis                Assessment/Plan  * Syncope and collapse- (present on admission)  Assessment & Plan  Episode of syncope and collapse while at the bank today.  2-month history of near syncope.  All episodes associated with nausea  -continue close monitoring on telemetry for any arrhythmias and/or decompensation  Echo has been ordered and pending     Atrial fibrillation (HCC)  Assessment & Plan  Restarting back her metoprolol  Also restarting back her pradaxa     JEREMIHA (obstructive sleep apnea)- (present on admission)  Assessment & Plan  Noncompliant with CPAP, as she she says she is unable to sleep with the mask over her face.  She reports waking with extreme fatigue and is tired throughout the day.  Prior to the CPAP, she was using oxygen at night.  Request not to use CPAP here as well  - Supplemental oxygen 2 L at at bedtime  - Encourage follow-up for CPAP adjustment    Hyperglycemia- (present on admission)  Assessment &  Plan  Blood sugar mildly elevated.  Reports intermittent peripheral neuropathy, which she was suffering from during my exam  -A1c    Situational stress- (present on admission)  Assessment & Plan  Patient's   recently, and she has not really had a place to live since, and has been living with various daughters and grandchildren.  She has had weight loss of about 30 pounds over the last 6 months, has had months of nausea and now with syncope.  Stress may be contributing to her current symptoms.  -Patient would benefit from case management intervention  -Ativan p.o. as needed     Hypothyroidism- (present on admission)  Assessment & Plan  - Continue Synthroid  -TSH levl    GERD (gastroesophageal reflux disease)- (present on admission)  Assessment & Plan  - Continue Protonix    HTN (hypertension)- (present on admission)  Assessment & Plan  BP still not well controlled   Increased amlodipine and starting back metoprolol   Close monitoring and adjust as needed           VTE prophylaxis:    therapeutic anticoagulation with pradaxa 150 mg BID      I have performed a physical exam and reviewed and updated ROS and Plan today (2023). In review of yesterday's note (2023), there are no changes except as documented above.

## 2023-08-20 NOTE — PROGRESS NOTES
Assumed care of patient; A&Ox4, irritable. Pt having multiple small hard stools. Pt weak today and needing bed side commode. Pt resting comfortably in bed. Will continue to monitor.

## 2023-08-20 NOTE — CARE PLAN
The patient is Watcher - Medium risk of patient condition declining or worsening    Shift Goals  Clinical Goals: Bowel movement and safe ambulation  Patient Goals: Relief from constipation  Family Goals:  (Not present)    Progress made toward(s) clinical / shift goals:    Problem: Knowledge Deficit - Standard  Goal: Patient and family/care givers will demonstrate understanding of plan of care, disease process/condition, diagnostic tests and medications  Outcome: Progressing  Note: POC discussed with patient. Patient informed on treatment plan and medications. Patient verbalized understanding.     Problem: Fall Risk  Goal: Patient will remain free from falls  Outcome: Progressing  Note: Call light and personal belongings placed within reach. Appropriate assistive device used when ambulating. Bed in lowest position. Treaded socks on. Bed alarm on.        Patient is not progressing towards the following goals:

## 2023-08-20 NOTE — THERAPY
Occupational Therapy   Initial Evaluation     Patient Name: Felipa Chavez  Age:  82 y.o., Sex:  female  Medical Record #: [id number]  Today's Date: 8/20/2023     Precautions: Fall Risk    Assessment    Patient is 82 y.o. female admitted after syncope in the bank, pt reports completing her own errands and managing self-care tasks independently prior to admit. Pt presents to OT eval limited by pain, nausea, decreased functional mobility and activity tolerance which impact pt's ability to complete ADLs/IADLs at baseline. Pt reports living in an upstairs bedroom at her granddaughters house, today she was not able to tolerate any additional activity than transfer to bedside commode and back to bed. Pt would be unsafe to DC back home in current condition as she is a high fall risk and high risk for readmission 2/2 debility. Strongly recommend post-acute placement. Will continue to follow.       Occupational Therapy Initial Treatment Plan   Treatment Interventions: Self Care / Activities of Daily Living, Neuro Re-Education / Balance, Therapeutic Exercises, Therapeutic Activity, Adaptive Equipment  Treatment Frequency: 3 Times per Week  Duration: Until Therapy Goals Met    DC Equipment Recommendations: Front-Wheel Walker  Discharge Recommendations: Recommend post-acute placement for additional occupational therapy services prior to discharge home     Objective     08/20/23 0951   Prior Living Situation   Prior Services None   Housing / Facility 2 Story House   Steps Into Home 2   Steps In Home 16   Bathroom Set up Bathtub / Shower Combination;Shower Chair   Equipment Owned 4-Wheel Walker;Tub / Shower Seat   Lives with - Patient's Self Care Capacity Child Less than 18 Years of Age;Adult Children   Comments pt lives with her granddaughter Airam and her 8 & 11 year old great grandkids   Prior Level of ADL Function   Self Feeding Independent   Grooming / Hygiene Independent   Bathing Independent   Dressing Independent   Toileting  "Independent   Prior Level of IADL Function   Medication Management Independent   Laundry Independent   Kitchen Mobility Independent   Finances Independent   Home Management Independent   Shopping Independent   Prior Level Of Mobility Independent Without Device in Community   Driving / Transportation Driving Independent   Comments pt reports doing her own shopping, admitting syncope occured at the bank while she was running errands   Precautions   Precautions Fall Risk   Pain 0 - 10 Group   Therapist Pain Assessment Post Activity Pain Same as Prior to Activity;Nurse Notified  (c/o nausea and fatigue)   Cognition    Level of Consciousness Alert   Strength Upper Body   Comments ot reports R >  L strength \"for a long time\"   Coordination Upper Body   Coordination WDL   Balance Assessment   Sitting Balance (Static) Fair +   Sitting Balance (Dynamic) Fair   Standing Balance (Static) Poor +   Standing Balance (Dynamic) Poor   Weight Shift Sitting Fair   Weight Shift Standing Poor   Bed Mobility    Sit to Supine Moderate Assist   Scooting Minimal Assist   ADL Assessment   Eating Modified Independent   Grooming Supervision;Seated   Upper Body Dressing Minimal Assist   Lower Body Dressing Moderate Assist   Toileting Moderate Assist   How much help from another person does the patient currently need...   Putting on and taking off regular lower body clothing? 2   Bathing (including washing, rinsing, and drying)? 3   Toileting, which includes using a toilet, bedpan, or urinal? 2   Putting on and taking off regular upper body clothing? 3   Taking care of personal grooming such as brushing teeth? 3   Eating meals? 4   6 Clicks Daily Activity Score 17   Functional Mobility   Sit to Stand Minimal Assist   Bed, Chair, Wheelchair Transfer Minimal Assist   Toilet Transfers Minimal Assist   Mobility 4WW   Activity Tolerance   Sitting in Chair 15min (BSC)   Sitting Edge of Bed 3min   Standing transfer only   Comments pt reports her " current activity tolerance is less than perviously this admit   Patient / Family Goals   Patient / Family Goal #1 get stronger   Short Term Goals   Short Term Goal # 1 pt will complete toilet transfer with SPV   Short Term Goal # 2 pt will complete pericare at SPV level   Short Term Goal # 3 pt will tolerate >5min standing grooming with SPV   Education Group   Education Provided Role of Occupational Therapist;Activities of Daily Living   Role of Occupational Therapist Patient Response Patient;Acceptance;Explanation;Verbal Demonstration   ADL Patient Response Patient;Acceptance;Explanation;Action Demonstration;Verbal Demonstration   Occupational Therapy Initial Treatment Plan    Treatment Interventions Self Care / Activities of Daily Living;Neuro Re-Education / Balance;Therapeutic Exercises;Therapeutic Activity;Adaptive Equipment   Treatment Frequency 3 Times per Week   Duration Until Therapy Goals Met   Problem List   Problem List Decreased Active Daily Living Skills;Decreased Homemaking Skills;Decreased Functional Mobility;Decreased Activity Tolerance;Safety Awareness Deficits / Cognition;Impaired Cognitive Function   Anticipated Discharge Equipment and Recommendations   DC Equipment Recommendations Front-Wheel Walker   Discharge Recommendations Recommend post-acute placement for additional occupational therapy services prior to discharge home

## 2023-08-20 NOTE — DISCHARGE PLANNING
RNCM met with patient on 8/19/23 to get choice for SNF. Patient stated that she had [facility] along with Medicare. RNCM messaged UR RN- Gabriella BERG to clarify. Per Gabriella- Patients [facility] termed on 1/1/23. Patient has Medicare A & B. Patient is also admitted under OBS status. Patient would require IN patient status and 2 (or 3) midnight stays, in order to be considered for SNF placement. Hospitalist, patient and bedside RN advised.  Also per Gabriella- The Internal Physician Advisors meet daily to review OBS patients. If patient meets criteria then she can be rolled to IN patient status. If not then patient will receive HH for RN, P/T and O/T therapies, at d/c.    09:08- RNCM left voicemail for grand daughter Airam # [phone]. We need her address for HH. Patient was able to tell me that Airam lives in [place], [place]. Per patient- any HH that takes Medicare. RNCM has sent choice form to [facility] to place in media for [facility].    12:07 RNCM spoke with patient's granddaughter- iAram her address is [address] [address]. Per Airam- there is no one to take care of patient if discharged back to her home. There is no one in the family willing to care for her. P/T is recommending SNF however, patient is in OBS status.     14:08- JANE has e-mail leadership regarding difficulties for discharge planning.

## 2023-08-20 NOTE — PROGRESS NOTES
Bedside report received. Patient A/Ox 4. VS 's. No oxygen requirements currently. Complains of nausea and declines to be medicated per MAR at this time. POC discussed with patient. Pt verbalizes understanding. Call light and belongings within reach. Bed locked and lowest position, alarm and fall precautions in place.

## 2023-08-21 PROCEDURE — 99233 SBSQ HOSP IP/OBS HIGH 50: CPT | Performed by: INTERNAL MEDICINE

## 2023-08-21 PROCEDURE — 92610 EVALUATE SWALLOWING FUNCTION: CPT

## 2023-08-21 PROCEDURE — A9270 NON-COVERED ITEM OR SERVICE: HCPCS | Performed by: INTERNAL MEDICINE

## 2023-08-21 PROCEDURE — 700102 HCHG RX REV CODE 250 W/ 637 OVERRIDE(OP): Performed by: INTERNAL MEDICINE

## 2023-08-21 PROCEDURE — A9270 NON-COVERED ITEM OR SERVICE: HCPCS | Performed by: NURSE PRACTITIONER

## 2023-08-21 PROCEDURE — G0378 HOSPITAL OBSERVATION PER HR: HCPCS

## 2023-08-21 PROCEDURE — 700102 HCHG RX REV CODE 250 W/ 637 OVERRIDE(OP): Performed by: NURSE PRACTITIONER

## 2023-08-21 RX ADMIN — OMEPRAZOLE 20 MG: 20 CAPSULE, DELAYED RELEASE ORAL at 06:23

## 2023-08-21 RX ADMIN — DABIGATRAN ETEXILATE MESYLATE 150 MG: 150 CAPSULE ORAL at 22:02

## 2023-08-21 RX ADMIN — OMEPRAZOLE 20 MG: 20 CAPSULE, DELAYED RELEASE ORAL at 17:44

## 2023-08-21 RX ADMIN — METOPROLOL TARTRATE 25 MG: 25 TABLET, FILM COATED ORAL at 06:23

## 2023-08-21 RX ADMIN — DABIGATRAN ETEXILATE MESYLATE 150 MG: 150 CAPSULE ORAL at 06:24

## 2023-08-21 RX ADMIN — ACETAMINOPHEN 650 MG: 325 TABLET, FILM COATED ORAL at 22:05

## 2023-08-21 RX ADMIN — LACTULOSE 30 ML: 20 SOLUTION ORAL at 17:44

## 2023-08-21 RX ADMIN — SENNOSIDES AND DOCUSATE SODIUM 2 TABLET: 50; 8.6 TABLET ORAL at 06:23

## 2023-08-21 RX ADMIN — LEVOTHYROXINE SODIUM 100 MCG: 0.1 TABLET ORAL at 06:23

## 2023-08-21 RX ADMIN — Medication 1000 UNITS: at 06:23

## 2023-08-21 RX ADMIN — AMLODIPINE BESYLATE 10 MG: 10 TABLET ORAL at 06:23

## 2023-08-21 RX ADMIN — LACTULOSE 30 ML: 20 SOLUTION ORAL at 06:23

## 2023-08-21 RX ADMIN — SENNOSIDES AND DOCUSATE SODIUM 2 TABLET: 50; 8.6 TABLET ORAL at 17:44

## 2023-08-21 RX ADMIN — METOPROLOL TARTRATE 25 MG: 25 TABLET, FILM COATED ORAL at 17:43

## 2023-08-21 ASSESSMENT — ENCOUNTER SYMPTOMS
NERVOUS/ANXIOUS: 0
SORE THROAT: 0
EYES NEGATIVE: 1
PALPITATIONS: 0
WEIGHT LOSS: 1
COUGH: 0
MYALGIAS: 0
VOMITING: 0
HEARTBURN: 0
FALLS: 0
ORTHOPNEA: 0
WEAKNESS: 0
SHORTNESS OF BREATH: 0
NAUSEA: 0
HEADACHES: 0
FEVER: 0
BACK PAIN: 0
CHILLS: 0
ABDOMINAL PAIN: 0
SPUTUM PRODUCTION: 0
CONSTIPATION: 1
FLANK PAIN: 0
DIAPHORESIS: 0
SINUS PAIN: 0
BLOOD IN STOOL: 0
WHEEZING: 0
DIZZINESS: 0
NECK PAIN: 0

## 2023-08-21 NOTE — CARE PLAN
The patient is Stable - Low risk of patient condition declining or worsening    Shift Goals  Clinical Goals: BM  Patient Goals: Rest  Family Goals: n/a    Progress made toward(s) clinical / shift goals:    Problem: Skin Integrity  Goal: Skin integrity is maintained or improved  Outcome: Progressing     Problem: Fall Risk  Goal: Patient will remain free from falls  Outcome: Progressing       Patient SpO2 going to 80's  oxygen started at 3l via nasal canulla

## 2023-08-21 NOTE — THERAPY
"Speech Language Pathology   Clinical Swallow Evaluation     Patient Name: Felipa Chavez  AGE:  82 y.o., SEX:  female  Medical Record #: [id number]  Date of Service: 8/21/2023      History of Present Illness  83 y/o female presented 8/15 after syncopal episode. Over the past 2mo, has had syncope related to nausea and dizziness. Reported EGD history.     No hx SLP in Epic.    CMHx: Syncope and collapse, HTN, GERD, hypothyroidism, JEREMIAH, chronic nausea, afib  PMHx: Esophageal stricture, HLD, ARF    CXR 8/16:  \"No acute cardiopulmonary abnormality.\"    XR Abdomen 8/19:  \"No acute process. Large amount of stool.\"    General Information:  Vitals  O2 (LPM): 3  O2 Delivery Device: Silicone Nasal Cannula  Level of Consciousness: Alert, Awake  Patient Behaviors: Drowsy, Fatigue  Orientation: Oriented x 4  Follows Directives: Yes    Prior Living Situation & Level of Function:  Prior Services: None  Lives with - Patient's Self Care Capacity: Child Less than 18 Years of Age, Adult Children  Comments: Pt has lived with various family members since her  has passed. Pt is currently living with her granddaughter and great grandkids.  Swallowing: WFL     Oral Mechanism Evaluation:  Dentition: Poor, Natural dentition, Missing posterior dentition   Facial Symmetry: Equal  Facial Sensation: Equal     Labial Observations: Open mouth posture   Lingual Observations: Midline  Motor Speech: WFL       Laryngeal Function:  Secretion Management: Adequate  Voice Quality: Other (see comments), WFL (Low vocal intensity, improved with moderate cueing.  Cough: Perceptually weak     Subjective  Pt cleared by RN for clinical bedside swallow evaluation. Pt was asleep upon arrival; However, was awoken with verbal/tactile cues. Pt reported fatigue and weakness. Pt denied recent Hx of pnuemonia and dysphagia. Pt endorsed Hx of GI. Pt reported acid reflux/GERD and regurgitation managed with medications. Per RN, pt noted to cough on PO. Pt reports \"coughing " "all the time.\" Pt endorses eating a soft and bite size diet at baseline d/t poor/missing dentition.     Assessment  Current Method of Nutrition: Oral diet (regular/ cardiac)  Positioning: Kwong's (60-90 degrees)  Bolus Administration: SLP, Patient  O2 (LPM): 3 O2 Delivery Device: Silicone Nasal Cannula  Factor(s) Affecting Performance: Impaired endurance     Swallowing Trials:  Swallowing Trials  Thin Liquid (TN0): WFL  Pureed (PU4): WFL  Regular (RG7): Impaired    Comments:   Pt was positioned upright midline at 90 degrees prior to PO trials. Patient managed with SLP assistance d/t weakness trials of sequential sips of thin liquids x3, single sips of thin liquids x10, liquidized x5, and regular x3 were completed. Pt demonstrated appropriate bolus containment, stripping, and  A/P transport inferred to be WNL as evidenced via lack of oral pocketing. Mastication was prolonged on regular, pt requesting liquid wash x4. HLE was palpated and present; swallow initiation inferred to be timely with single swallow per bolus. Intermittent coughing appreciated prior to, during, and post PO trials. Unable to determine baseline cough vs. concern for airway invasion. Vocal quality remained clear. No increased WOB or de-stating appreciated. Eructation appreciated x3 following PO concerning for esophageal dysphagia. Pt denied odynophagia and globus.     Pt education provided on the anatomy/physiology of deglutition and s/sx of aspiration. Additional education provided on good, thorough, oral care and mobility, as medically appropriate, to mitigate the risk associated with aspiration. Utilizing the teach back method, pt verbally demonstrated understanding with 100% accuracy. Additional education provided on reflux precautions. Pt required moderate cues to implement during the evaluation with 80% accuracy.      Clinical Impressions  Pt presents with a mild oral dysphagia evidenced via delayed mastication on regular items; consistent " with poor/missing dentition, generalized weakness, and sarcopenia. Intermittent coughing appreciated throughout the evaluation, difficult to determine cough vs. concern for airway invasion. Presentation consistent with presbyphagia. No other s/sx of aspiration appreciated on all PO. Pt is at increased risk for impaired swallow efficiency and airway protection d/t generalized weakness and Hx of esophageal dysphagia. However, pt managing acid reflux/GERD with medications, denies regurgitation/emesis, and declines globus and odynophagia. SLP recommending careful initiation of soft and bite size solid and thin liquid diet with adherence to the following: fully upright at midline during PO intake, slow rate, small volume, alternate bites/sips, and remaining upright after PO for 45 minutes. Skilled ST in the acute setting indicated to monitor diet tolerance and maximize dysphagia outcomes. Should change in status occur, or s/sx of aspiration be appreciated on PO, please downgrade as appropriate and consult SLP. Please hold PO with lethargy.     Recommendations  Diet Consistency: soft and bite size solids (SB6) and thin liquids (TN0)  Instrumentation: None indicated at this time   Medication: Whole with liquid, As tolerated  Supervision: Close supervision - patient may be left alone for less than 5 minutes at a time, Assist with meal tray set up, Careful 1:1 hand feeding (Pt may require A with feeding d/t weakness)  Positioning: Fully upright and midline during oral intake, Remain upright for 45 minutes after oral intake, Meals sitting upright in a chair, as tolerated  Risk Management : Small bites/sips, Alternate bites and sips, Slow rate of intake, Physical mobility, as tolerated  Oral Care: Q6h     SLP Treatment Plan  Treatment Plan: Dysphagia Treatment, Patient/Family/Caregiver Training  SLP Frequency: 2x Per Week  Estimated Duration: Until Therapy Goals Met    Anticipated Discharge Needs  Discharge Recommendations:  "Other (TBD pend clinical progress)   Therapy Recommendations Upon DC: Dysphagia Training, Patient / Family / Caregiver Education      Patient / Family Goals  Patient / Family Goal #1: \"apple juice\"  Short Term Goals  Short Term Goal # 1: Pt will consume soft and bite size solid and thin liquid diet without any overt s/sx of aspiration or change in respiratory status.  Short Term Goal # 2: With min cues, pt will implement reflux precautions during PO intake with >85% accuracy.    Camille Chaudhry, SLP   "

## 2023-08-21 NOTE — PROGRESS NOTES
Patient received in a bed, alert and oriented *4 on room air. Skin assessment done. Bed locked and in lowest position.    2 RN Skin Check    .  4 Eyes Skin Assessment Completed by shaggy RN and MANI escalante.    Head WDL  Ears WDL  Nose WDL  Mouth WDL  Neck WDL  Breast/Chest WDL  Shoulder Blades WDL  Spine WDL  (R) Arm/Elbow/Hand bruising  (L) Arm/Elbow/Hand Bruising  Abdomen WDL  Groin WDL  Scrotum/Coccyx/Buttocks WDL  (R) Leg WDL  (L) Leg WDL  (R) Heel/Foot/Toe WDL  (L) Heel/Foot/Toe WDL              Interventions In Place pillows    Possible Skin Injury No    Pictures Uploaded Into Epic N/A  Wound Consult Placed N/A  RN Wound Prevention Protocol Ordered No      Has bruising on the upper arm from iv placement. Healing well.

## 2023-08-21 NOTE — PROGRESS NOTES
Bedside report received. Pt A&Ox4. Complains of no pain at this time. Pt is medical. POC discussed with pt. Pt verbalizes understanding. Call light and belongings within reach. Bed locked and in lowest position. Alarm and fall precautions in place.

## 2023-08-21 NOTE — CARE PLAN
The patient is Stable - Low risk of patient condition declining or worsening    Shift Goals  Clinical Goals: Promote sleep  Patient Goals: Rest  Family Goals: n/a    Progress made toward(s) clinical / shift goals:  pt updated on the POC, q2hr turns in use, attempted to ambulate the patient this am twice, however, the pt is very unsteady and had to put patient back to bed, utilizing bed pan as of now. PT OT recommending post acute       Problem: Knowledge Deficit - Standard  Goal: Patient and family/care givers will demonstrate understanding of plan of care, disease process/condition, diagnostic tests and medications  Outcome: Progressing     Problem: Skin Integrity  Goal: Skin integrity is maintained or improved  Outcome: Progressing

## 2023-08-21 NOTE — PROGRESS NOTES
[facility] Medicine Daily Progress Note    Date of Service  2023    Chief Complaint  Felipa Chavez is a 82 y.o. female admitted 8/15/2023 with fall     Hospital Course  This is a 83 y/o F with PMHX of hypothyroidism, hypertension, CAD, JEREMIAH with intermittent use of CPAP and depression who presented to the ED on 8/15/2023 after having an episode of syncope and collapse.  [place] the [place], she suddenly felt nauseated and dizzy and then passed out.  She recalls lying on the floor feeling confused, unclear what happened. She denies experiencing anything other than dizziness or nausea prior to falling, denies lightheadedness, chest pain, palpitations, shortness of breath, or diaphoresis  She reports significant stress since her   recently and has lost 30 pounds over the last 6-month since he passed away.  She has since had to live with different daughters and grandkids, but this has not been working out, and she has been significantly depressed over feeling like she has no place to live. She has not seen a provider since last February or March when she lived in [place].  She is currently living locally with her granddaughter, but home life is incredibly stressful for her.  She reports of being compliant with her medications.    CT head  obtained in the ED is negative for any acute findings, but shows right sphenoid sinusitis.  CT of her cervical spine is negative for acute fracture or dislocation, but does show multilevel disc and facet degeneration.  Chest x-ray is negative  Patient admitted for further work up and treatment     Interval Problem Update  Patient seen and examind, afebrile, still feels weak and tired, has some nausea , denies chest pain  BP still not well controlled.adding amlodipine  Echo pending  : Patient resting in bed afebrile, BP not well controlled today, SBP of 180 increased amlodipine dose, also starting back metoprolol also for her A.fibb restarting pradaxa.  PT/OT eval    8/18: Patient seen and examined, feeling weka still with some nausea cont on Po and IV antiemetics,  POOJA slowly improving.   PT/OT eval   8/19: Patient seen and examined, was complaining of abdominal pain, I have ordered a abdominal xray which was personally reviewed and shows stool but no obstruction  Will start on bowel protocol   Will need placement   8/20: Patient resting in bed afebrile, still having some abdominal cramp, no BM still cnstipated will add lactulose today   PT/OT will need placement     8/21 patient seen and evaluated bedside.  She is in no acute distress.  She denies any dizziness or lightheadedness.  Encourage patient to mobilize with assistance.  PT OT.  Will likely need placement to SNF versus rehab      I have discussed this patient's plan of care and discharge plan at IDT rounds today with Case Management, Nursing, Nursing leadership, and other members of the IDT team.    Consultants/Specialty  None     Code Status  Full Code    Disposition  The patient is medically cleared for discharge to home or a post-acute facility.  Anticipate discharge to: an inpatient rehabilitation hospital    I have placed the appropriate orders for post-discharge needs.    Review of Systems  Review of Systems   Constitutional:  Positive for malaise/fatigue and weight loss. Negative for chills, diaphoresis and fever.   HENT:  Negative for congestion, ear pain, sinus pain and sore throat.    Eyes: Negative.    Respiratory:  Negative for cough, sputum production, shortness of breath and wheezing.    Cardiovascular:  Negative for chest pain, palpitations, orthopnea and leg swelling (Reports intermittent leg swelling that resolves after she lays down for a while).   Gastrointestinal:  Positive for constipation (Chronic). Negative for abdominal pain, blood in stool, heartburn (Chronic), nausea and vomiting.   Genitourinary:  Negative for dysuria, flank pain, frequency, hematuria and urgency.   Musculoskeletal:   Negative for back pain, falls, myalgias and neck pain.   Skin:  Negative for itching and rash.   Neurological:  Negative for dizziness, weakness and headaches.   Psychiatric/Behavioral:  The patient is not nervous/anxious.    All other systems reviewed and are negative.       Physical Exam  Temp:  [36.3 °C (97.3 °F)-37.1 °C (98.8 °F)] 37.1 °C (98.8 °F)  Pulse:  [46-77] 68  Resp:  [15-19] 15  BP: (112-133)/(48-61) 112/55  SpO2:  [76 %-97 %] 94 %    Physical Exam  Vitals and nursing note reviewed.   Constitutional:       General: She is not in acute distress.     Appearance: She is not ill-appearing.      Comments: Frail   HENT:      Head: Normocephalic and atraumatic.      Nose: Nose normal. No congestion or rhinorrhea.      Mouth/Throat:      Mouth: Mucous membranes are dry.      Pharynx: Oropharynx is clear.   Eyes:      Extraocular Movements: Extraocular movements intact.      Pupils: Pupils are equal, round, and reactive to light.   Cardiovascular:      Rate and Rhythm: Normal rate and regular rhythm.      Pulses: Normal pulses.      Heart sounds: No murmur heard.     No friction rub. No gallop.   Pulmonary:      Effort: Pulmonary effort is normal. No respiratory distress.      Breath sounds: No wheezing, rhonchi or rales.   Abdominal:      General: Abdomen is flat. There is no distension.      Palpations: Abdomen is soft.      Tenderness: There is no abdominal tenderness. There is no guarding or rebound.   Musculoskeletal:         General: Normal range of motion.      Cervical back: Normal range of motion.      Right lower leg: No edema.      Left lower leg: No edema.   Skin:     General: Skin is warm and dry.      Capillary Refill: Capillary refill takes less than 2 seconds.      Coloration: Skin is not jaundiced.      Findings: No bruising or lesion.   Neurological:      Mental Status: She is alert and oriented to person, place, and time.   Psychiatric:         Mood and Affect: Mood normal.         Behavior:  Behavior normal.         Fluids  No intake or output data in the 24 hours ending 08/21/23 1658        Laboratory  Recent Labs     08/19/23  0317   WBC 5.6   RBC 4.12*   HEMOGLOBIN 12.9   HEMATOCRIT 38.8   MCV 94.2   MCH 31.3   MCHC 33.2   RDW 46.3   PLATELETCT 262   MPV 9.5       Recent Labs     08/19/23  0317   SODIUM 130*   POTASSIUM 4.3   CHLORIDE 96   CO2 23   GLUCOSE 107*   BUN 26*   CREATININE 0.84   CALCIUM 9.1                     Imaging  [id number] VIEW   Final Result      No acute process. Large amount of stool.      EC-ECHOCARDIOGRAM COMPLETE W/O CONT   Final Result      DX-CHEST-PORTABLE (1 VIEW)   Final Result      No acute cardiopulmonary abnormality.      CT-CSPINE WITHOUT PLUS RECONS   Final Result      No acute fracture or dislocation of the cervical spine.      Multilevel disc and facet degeneration.      CT-HEAD W/O   Final Result      1.  Chronic microvascular ischemic type changes.   2.  No acute intracranial abnormality.   3.  Right sphenoid sinusitis                Assessment/Plan  * Syncope and collapse- (present on admission)  Assessment & Plan  Episode of syncope and collapse while at the bank today.  2-month history of near syncope.  All episodes associated with nausea, likely vasovagal  No arrhythmias noted on telemetry  Echo revealed LVEF 65% aortic valve sclerosis without stenosis  PT OT    Atrial fibrillation (HCC)  Assessment & Plan  Restarting back her metoprolol  Also restarting back her pradaxa     JEREMIAH (obstructive sleep apnea)- (present on admission)  Assessment & Plan  Noncompliant with CPAP, as she she says she is unable to sleep with the mask over her face.  She reports waking with extreme fatigue and is tired throughout the day.  Prior to the CPAP, she was using oxygen at night.  Request not to use CPAP here as well  - Supplemental oxygen 2 L at at bedtime  - Encourage follow-up for CPAP adjustment    Hyperglycemia- (present on admission)  Assessment & Plan  Blood sugar  mildly elevated.  Reports intermittent peripheral neuropathy, which she was suffering from during my exam  -A1c    Situational stress- (present on admission)  Assessment & Plan  Patient's   recently, and she has not really had a place to live since, and has been living with various daughters and grandchildren.  She has had weight loss of about 30 pounds over the last 6 months, has had months of nausea and now with syncope.  Stress may be contributing to her current symptoms.  -Patient would benefit from case management intervention  -Ativan p.o. as needed     Hypothyroidism- (present on admission)  Assessment & Plan  - Continue Synthroid  -TSH levl    GERD (gastroesophageal reflux disease)- (present on admission)  Assessment & Plan  - Continue Protonix    HTN (hypertension)- (present on admission)  Assessment & Plan  BP still not well controlled   Increased amlodipine and starting back metoprolol   Close monitoring and adjust as needed           VTE prophylaxis: Pradaxa    I have performed a physical exam and reviewed and updated ROS and Plan today (2023). In review of yesterday's note (2023), there are no changes except as documented above.    I spent 55 minutes, reviewing the chart, obtaining and/or reviewing separately obtained history. Performing a medically appropriate examination and evaluation.  Counseling and educating the patient. Ordering and reviewing medications, tests, or procedures.  Discussing the case with other healthcare providers.  Documenting clinical information in EPIC. Independently interpreting results and communicating results to patient. Discussing future disposition of care with patient, RN and case management.

## 2023-08-21 NOTE — DIETARY
Nutrition Update:    Day 0 of admit.  Felipa Chavez is a 82 y.o. female with admitting DX of Syncope and collapse [R55].  Patient being followed to optimize nutrition.    Current Diet: Level 6 - Soft and Bite Sized (needs feeding A d/t to weakness); Liquid level: Level 0 - Thin; Second Modifier: (optional): Cardiac     Problem: Nutritional:  Goal: Achieve adequate nutritional intake  Description: Patient will consume >50% of meals  Outcome: Not Met. Recent PO mostly 0% or <25%, likely d/t weakness. SLP evaluated today and diet was changed. Pt likely to eat better with assistance d/t weakness. Spoke with MD Gerard and viviana to add supplement order for added caloric intake. RD to add Boost TID.

## 2023-08-22 ENCOUNTER — HOSPITAL ENCOUNTER (INPATIENT)
Facility: REHABILITATION | Age: 83
End: 2023-08-22
Attending: PHYSICAL MEDICINE & REHABILITATION | Admitting: PHYSICAL MEDICINE & REHABILITATION
Payer: MEDICARE

## 2023-08-22 PROBLEM — Z71.89 ACP (ADVANCE CARE PLANNING): Status: ACTIVE | Noted: 2023-08-22

## 2023-08-22 LAB
ANION GAP SERPL CALC-SCNC: 8 MMOL/L (ref 7–16)
BUN SERPL-MCNC: 60 MG/DL (ref 8–22)
CALCIUM SERPL-MCNC: 8.5 MG/DL (ref 8.5–10.5)
CHLORIDE SERPL-SCNC: 94 MMOL/L (ref 96–112)
CO2 SERPL-SCNC: 27 MMOL/L (ref 20–33)
CREAT SERPL-MCNC: 1.67 MG/DL (ref 0.5–1.4)
ERYTHROCYTE [DISTWIDTH] IN BLOOD BY AUTOMATED COUNT: 48.8 FL (ref 35.9–50)
GFR SERPLBLD CREATININE-BSD FMLA CKD-EPI: 30 ML/MIN/1.73 M 2
GLUCOSE SERPL-MCNC: 105 MG/DL (ref 65–99)
HCT VFR BLD AUTO: 36.1 % (ref 37–47)
HGB BLD-MCNC: 11.9 G/DL (ref 12–16)
MCH RBC QN AUTO: 31.3 PG (ref 27–33)
MCHC RBC AUTO-ENTMCNC: 33 G/DL (ref 32.2–35.5)
MCV RBC AUTO: 95 FL (ref 81.4–97.8)
PLATELET # BLD AUTO: 217 K/UL (ref 164–446)
PMV BLD AUTO: 9.5 FL (ref 9–12.9)
POTASSIUM SERPL-SCNC: 4.3 MMOL/L (ref 3.6–5.5)
RBC # BLD AUTO: 3.8 M/UL (ref 4.2–5.4)
SODIUM SERPL-SCNC: 129 MMOL/L (ref 135–145)
WBC # BLD AUTO: 8.7 K/UL (ref 4.8–10.8)

## 2023-08-22 PROCEDURE — 99497 ADVNCD CARE PLAN 30 MIN: CPT | Performed by: STUDENT IN AN ORGANIZED HEALTH CARE EDUCATION/TRAINING PROGRAM

## 2023-08-22 PROCEDURE — 36415 COLL VENOUS BLD VENIPUNCTURE: CPT

## 2023-08-22 PROCEDURE — G2212 PROLONG OUTPT/OFFICE VIS: HCPCS | Performed by: PHYSICAL MEDICINE & REHABILITATION

## 2023-08-22 PROCEDURE — 700102 HCHG RX REV CODE 250 W/ 637 OVERRIDE(OP): Performed by: INTERNAL MEDICINE

## 2023-08-22 PROCEDURE — 51798 US URINE CAPACITY MEASURE: CPT

## 2023-08-22 PROCEDURE — 700102 HCHG RX REV CODE 250 W/ 637 OVERRIDE(OP): Performed by: NURSE PRACTITIONER

## 2023-08-22 PROCEDURE — 97530 THERAPEUTIC ACTIVITIES: CPT | Mod: XU

## 2023-08-22 PROCEDURE — G0378 HOSPITAL OBSERVATION PER HR: HCPCS

## 2023-08-22 PROCEDURE — A9270 NON-COVERED ITEM OR SERVICE: HCPCS | Performed by: NURSE PRACTITIONER

## 2023-08-22 PROCEDURE — 85027 COMPLETE CBC AUTOMATED: CPT

## 2023-08-22 PROCEDURE — A9270 NON-COVERED ITEM OR SERVICE: HCPCS | Performed by: INTERNAL MEDICINE

## 2023-08-22 PROCEDURE — 99232 SBSQ HOSP IP/OBS MODERATE 35: CPT | Mod: 25 | Performed by: STUDENT IN AN ORGANIZED HEALTH CARE EDUCATION/TRAINING PROGRAM

## 2023-08-22 PROCEDURE — 700111 HCHG RX REV CODE 636 W/ 250 OVERRIDE (IP): Performed by: NURSE PRACTITIONER

## 2023-08-22 PROCEDURE — 97535 SELF CARE MNGMENT TRAINING: CPT

## 2023-08-22 PROCEDURE — 92526 ORAL FUNCTION THERAPY: CPT

## 2023-08-22 PROCEDURE — 99205 OFFICE O/P NEW HI 60 MIN: CPT | Mod: 25 | Performed by: PHYSICAL MEDICINE & REHABILITATION

## 2023-08-22 PROCEDURE — 80048 BASIC METABOLIC PNL TOTAL CA: CPT

## 2023-08-22 RX ORDER — DABIGATRAN ETEXILATE 75 MG/1
75 CAPSULE ORAL 2 TIMES DAILY
Status: DISCONTINUED | OUTPATIENT
Start: 2023-08-22 | End: 2023-09-02 | Stop reason: HOSPADM

## 2023-08-22 RX ADMIN — AMLODIPINE BESYLATE 10 MG: 10 TABLET ORAL at 06:01

## 2023-08-22 RX ADMIN — METOPROLOL TARTRATE 25 MG: 25 TABLET, FILM COATED ORAL at 06:01

## 2023-08-22 RX ADMIN — LEVOTHYROXINE SODIUM 100 MCG: 0.1 TABLET ORAL at 06:01

## 2023-08-22 RX ADMIN — METOPROLOL TARTRATE 25 MG: 25 TABLET, FILM COATED ORAL at 17:57

## 2023-08-22 RX ADMIN — Medication 1000 UNITS: at 06:01

## 2023-08-22 RX ADMIN — OMEPRAZOLE 20 MG: 20 CAPSULE, DELAYED RELEASE ORAL at 06:01

## 2023-08-22 RX ADMIN — DABIGATRAN ETEXILATE MESYLATE 150 MG: 150 CAPSULE ORAL at 06:01

## 2023-08-22 RX ADMIN — LACTULOSE 30 ML: 20 SOLUTION ORAL at 06:04

## 2023-08-22 RX ADMIN — ACETAMINOPHEN 650 MG: 325 TABLET, FILM COATED ORAL at 13:38

## 2023-08-22 RX ADMIN — OMEPRAZOLE 20 MG: 20 CAPSULE, DELAYED RELEASE ORAL at 17:57

## 2023-08-22 RX ADMIN — ONDANSETRON 4 MG: 4 TABLET, ORALLY DISINTEGRATING ORAL at 10:46

## 2023-08-22 ASSESSMENT — COGNITIVE AND FUNCTIONAL STATUS - GENERAL
TURNING FROM BACK TO SIDE WHILE IN FLAT BAD: UNABLE
DRESSING REGULAR UPPER BODY CLOTHING: A LITTLE
MOBILITY SCORE: 6
SUGGESTED CMS G CODE MODIFIER DAILY ACTIVITY: CK
DRESSING REGULAR LOWER BODY CLOTHING: A LOT
STANDING UP FROM CHAIR USING ARMS: TOTAL
CLIMB 3 TO 5 STEPS WITH RAILING: TOTAL
PERSONAL GROOMING: A LITTLE
SUGGESTED CMS G CODE MODIFIER MOBILITY: CN
HELP NEEDED FOR BATHING: A LITTLE
MOVING TO AND FROM BED TO CHAIR: UNABLE
MOVING FROM LYING ON BACK TO SITTING ON SIDE OF FLAT BED: UNABLE
EATING MEALS: A LITTLE
TOILETING: A LITTLE
DAILY ACTIVITIY SCORE: 17
WALKING IN HOSPITAL ROOM: TOTAL

## 2023-08-22 ASSESSMENT — ENCOUNTER SYMPTOMS
CHILLS: 0
HEARTBURN: 1
FEVER: 0
SHORTNESS OF BREATH: 0
VOMITING: 0
NECK PAIN: 0
ORTHOPNEA: 0
DIAPHORESIS: 0
SPUTUM PRODUCTION: 0
BACK PAIN: 0
WHEEZING: 0
DEPRESSION: 1
BLOOD IN STOOL: 0
PALPITATIONS: 0
CONSTIPATION: 1
DIZZINESS: 1
MYALGIAS: 0
SORE THROAT: 0
NERVOUS/ANXIOUS: 1
EYES NEGATIVE: 1
FALLS: 1
ABDOMINAL PAIN: 0
FLANK PAIN: 0
NAUSEA: 1
WEAKNESS: 0
SINUS PAIN: 0
COUGH: 0
WEIGHT LOSS: 1
HEADACHES: 0

## 2023-08-22 ASSESSMENT — GAIT ASSESSMENTS: GAIT LEVEL OF ASSIST: UNABLE TO PARTICIPATE

## 2023-08-22 NOTE — THERAPY
"Occupational Therapy  Daily Treatment     Patient Name: Felipa Chavez  Age:  82 y.o., Sex:  female  Medical Record #: [id number]  Today's Date: 8/22/2023     Precautions  Precautions: Fall Risk, Swallow Precautions    Assessment    Pt participated in seated/standing self care, and functional mobility to the bathroom w/ her personal 4ww. She remains limited by weakness, fatigue, impaired balance.    Plan    Treatment Plan Status: Continue Current Treatment Plan  Type of Treatment: Self Care / Activities of Daily Living, Neuro Re-Education / Balance, Therapeutic Exercises, Therapeutic Activity, Adaptive Equipment  Treatment Frequency: 3 Times per Week  Treatment Duration: Until Therapy Goals Met    DC Equipment Recommendations: Unable to determine at this time  Discharge Recommendations: Recommend post-acute placement for additional occupational therapy services prior to discharge home    Subjective    \"This is how I lay in bed at home.\"     Objective       08/22/23 1344   Cognition    Cognition / Consciousness X   Level of Consciousness Alert   Comments cooperative, a bit delayed at times   Balance   Sitting Balance (Static) Fair +   Sitting Balance (Dynamic) Fair   Standing Balance (Static) Poor +   Standing Balance (Dynamic) Poor   Weight Shift Sitting Fair   Weight Shift Standing Poor   Skilled Intervention Verbal Cuing;Sequencing   Comments w/ 4ww   Bed Mobility    Supine to Sit Minimal Assist   Sit to Supine Minimal Assist   Scooting Minimal Assist   Skilled Intervention Verbal Cuing;Tactile Cuing;Sequencing   Activities of Daily Living   Grooming Supervision;Seated   Toileting Minimal Assist   Skilled Intervention Verbal Cuing;Tactile Cuing;Sequencing   Comments declined further ADLs   How much help from another person does the patient currently need...   Putting on and taking off regular lower body clothing? 2   Bathing (including washing, rinsing, and drying)? 3   Toileting, which includes using a toilet, bedpan, " or urinal? 3   Putting on and taking off regular upper body clothing? 3   Taking care of personal grooming such as brushing teeth? 3   Eating meals? 3   6 Clicks Daily Activity Score 17   Functional Mobility   Sit to Stand Minimal Assist   Bed, Chair, Wheelchair Transfer Minimal Assist   Toilet Transfers Minimal Assist   Mobility EOB>amb to BR>BTB   Skilled Intervention Verbal Cuing;Tactile Cuing;Sequencing   Comments w/ 4ww   Patient / Family Goals   Patient / Family Goal #1 get stronger   Short Term Goals   Short Term Goal # 1 pt will complete toilet transfer with SPV   Goal Outcome # 1 Progressing as expected   Short Term Goal # 2 pt will complete pericare at SPV level   Goal Outcome # 2 Progressing as expected   Short Term Goal # 3 pt will tolerate >5min standing grooming with SPV   Goal Outcome # 3 Goal not met

## 2023-08-22 NOTE — ASSESSMENT & PLAN NOTE
"I have discussed with patient  regarding advance care planning. Patient is aox 4, with capacity to make medical decisions.  We discussed regarding CODE STATUS, CPR and intubation with mechanical ventilation, discussed regarding risk and benefits. patient decided for DNR/DNI. She said, \"just let me go\". patient CODE STATUS has been updated in [facility] with DNR/DNI as per patient's wishes.  I have spent 17 minutes discussing and with patient  8/25 I discussed the CODE STATUS with the granddaughter Airam on the phone.  She confirmed DNR/DNI.  She said the patient decided DNR/DNI prior to admission  "

## 2023-08-22 NOTE — DISCHARGE PLANNING
[facility] Transitional Care Coordination    Referral from:  Dr Fuentes  Insurance Provider on Facesheet: Medicare A/B, no secondary per notes  Potential Rehab Diagnosis: Debility    Chart review indicates patient may have on going medical management and may have therapy needs to possibly meet inpatient rehab facility criteria with the goal of returning to community.    D/C support: Per granddaughter there is no one in the family willing to care for her, patient wants to return to [place] and has a friend who will help with transport.     Physiatry consultation forwarded per protocol.     Physiatry to consult, TCC will follow.     Thank you for the referral.

## 2023-08-22 NOTE — CARE PLAN
The patient is Stable - Low risk of patient condition declining or worsening    Shift Goals  Clinical Goals: hemodynamic stability  Patient Goals: rest  Family Goals: irma    Progress made toward(s) clinical / shift goals:  yes    Patient is not progressing towards the following goals:      Problem: Knowledge Deficit - Standard  Goal: Patient and family/care givers will demonstrate understanding of plan of care, disease process/condition, diagnostic tests and medications  Outcome: Progressing     Problem: Fall Risk  Goal: Patient will remain free from falls  Outcome: Progressing     Problem: Depression  Goal: Patient and family/caregiver will verbalize accurate information about at least two of the possible causes of depression, three-four of the signs and symptoms of depression  Outcome: Progressing     Problem: Pain - Standard  Goal: Alleviation of pain or a reduction in pain to the patient’s comfort goal  Outcome: Progressing     Problem: Skin Integrity  Goal: Skin integrity is maintained or improved  Outcome: Progressing

## 2023-08-22 NOTE — THERAPY
"Speech Language Pathology   Daily Treatment     Patient Name: Felipa Chavez  AGE:  82 y.o., SEX:  female  Medical Record #: [id number]  Date of Service: 2023      Precautions: Fall Risk, Swallow Precautions     Subjective  Pt cleared by RN for dysphagia Tx. Per RN, pt with limited PO intake <25% of meals due to poor appetite. Pt was received asleep; However, was easily awoken with verbal cues. Pt was cooperative with SLP tasks. Stating, \"since my   I've just given up.\" Pt may benefit from psychological evaluation. RN and MD aware.     Assessment  Pt was positioned in Kwong's prior to PO trials. Pt managed trials of single and sequential sips of boost protein shake x15 completed. Pt refused additional PO d/t satiety. Pt demonstrated adequate labial seal. Single swallow appreciated per bolus. No overt s/sx of laryngeal insufficiency appreciated. Delayed eructation appreciated following PO and report of nausea concerning for esophageal dysphagia. Pt endorsed this has been \"happening for years\" d/t GERD/reflux. No emesis/regurgitation or globus reported. SLP reviewed reflux precautions with patient. Pt implemented reflux precautions during Tx with min cues with 75% accuracy.     Clinical Impressions  No clinical s/sx of oropharyngeal dysphagia appreciated on all PO. Pt is at increased risk for impaired swallow efficiency and airway protection d/t generalized weakness and Hx of esophageal dysphagia. However, pt managing acid reflux/GERD with medications and denies regurgitation/emesis/globus. SLP recommending careful initiation of soft and bite size solid and thin liquid diet with adherence to the following: fully upright at midline during PO intake, slow rate, small volume, alternate bites/sips, and remaining upright after PO for 45 minutes. Skilled ST in the acute setting indicated to monitor diet tolerance and maximize dysphagia outcomes. Pt may benefit from GI consult on an OP basis d/t significant Hx of " "GERD/esophageal stricture and eructation appreciated following PO. RN and MD aware.     Recommendations  Treatment Completed: Dysphagia Treatment  Consult Referral(s): Psychological, Other (see comments) (Patient may benefit from GI consult on an OP basis d/t significant Hx of GERD/esophageal stricture and eructation appreciated following PO.)   Diet Consistency: soft and bite size solids (SB6) and thin liquids (TN0)  Instrumentation: None indicated at this time  Medication: Whole with liquid, As tolerated  Supervision: Distant supervision - check on patient 2-3 times per meal, Assist with meal tray set up  Positioning: Fully upright and midline during oral intake, Remain upright for 45 minutes after oral intake, Meals sitting upright in a chair, as tolerated  Risk Management : Small bites/sips, Alternate bites and sips, Slow rate of intake, Physical mobility, as tolerated  Oral Care: Q6h    SLP Treatment Plan  Treatment Plan: Dysphagia Treatment, Patient/Family/Caregiver Training  SLP Frequency: 2x Per Week  Estimated Duration: Until Therapy Goals Met    Anticipated Discharge Needs  Discharge Recommendations: Anticipate that the patient will have no further speech therapy needs after discharge from the hospital  Therapy Recommendations Upon DC: Dysphagia Training, Patient / Family / Caregiver Education    Patient / Family Goals  Patient / Family Goal #1: \"apple juice\"  Goal #1 Outcome: Goal met  Short Term Goals  Short Term Goal # 1: Pt will consume soft and bite size solid and thin liquid diet without any overt s/sx of aspiration or change in respiratory status.  Goal Outcome # 1: Progressing as expected  Short Term Goal # 2: With min cues, pt will implement reflux precautions during PO intake with >85% accuracy.  Goal Outcome # 2 : Progressing as expected    Camille Chaudhry, SLP  "

## 2023-08-22 NOTE — DISCHARGE PLANNING
Case Management Discharge Planning    Admission Date: 8/15/2023  GMLOS:    ALOS: 0    6-Clicks ADL Score: 17  6-Clicks Mobility Score: 15  PT and/or OT Eval ordered: Yes  Post-acute Referrals Ordered: Yes  Post-acute Choice Obtained: Yes  Has referral(s) been sent to post-acute provider:  Yes      Anticipated Discharge Dispo: Discharge Disposition: Discharged to home/self care (01)    DME Needed: No    Action(s) Taken: Updated Provider/Nurse on Discharge Plan    Escalations Completed: None    Medically Clear: Yes    Next Steps: Pt in OBV status, can't got to SNF. Discussed rehab options, chose [facility] first and [facility].    Barriers to Discharge: Pending Placement    Is the patient up for discharge tomorrow: No

## 2023-08-22 NOTE — DISCHARGE PLANNING
Received Choice form @: 4911  Agency/Facility Name: [facility]  Referral sent per Choice form @: 9622

## 2023-08-22 NOTE — PROGRESS NOTES
[facility] Medicine Daily Progress Note    Date of Service  2023    Chief Complaint  Felipa Chavez is a 82 y.o. female admitted 8/15/2023 with fall     Hospital Course  This is a 83 y/o F with PMHX of hypothyroidism, hypertension, CAD, JEREMIAH with intermittent use of CPAP and depression who presented to the ED on 8/15/2023 after having an episode of syncope and collapse.  she [place] the [place], she suddenly felt nauseated and dizzy and then passed out.  She recalls lying on the floor feeling confused, unclear what happened. She denies experiencing anything other than dizziness or nausea prior to falling, denies lightheadedness, chest pain, palpitations, shortness of breath, or diaphoresis  She reports significant stress since her   recently and has lost 30 pounds over the last 6-month since he passed away.  She has since had to live with different daughters and grandkids, but this has not been working out, and she has been significantly depressed over feeling like she has no place to live. She has not seen a provider since last February or March when she lived in [place].  She is currently living locally with her granddaughter, but home life is incredibly stressful for her.  She reports of being compliant with her medications.    CT head  obtained in the ED is negative for any acute findings, but shows right sphenoid sinusitis.  CT of her cervical spine is negative for acute fracture or dislocation, but does show multilevel disc and facet degeneration.  Chest x-ray is negative  Patient admitted for further work up and treatment     Interval Problem Update  Patient seen and examind, afebrile, still feels weak and tired, has some nausea , denies chest pain  BP still not well controlled.adding amlodipine  Echo pending  : Patient resting in bed afebrile, BP not well controlled today, SBP of 180 increased amlodipine dose, also starting back metoprolol also for her A.fibb restarting pradaxa.  PT/OT eval    8/18: Patient seen and examined, feeling weka still with some nausea cont on Po and IV antiemetics,  POOJA slowly improving.   PT/OT eval   8/19: Patient seen and examined, was complaining of abdominal pain, I have ordered a abdominal xray which was personally reviewed and shows stool but no obstruction  Will start on bowel protocol   Will need placement   8/20: Patient resting in bed afebrile, still having some abdominal cramp, no BM still cnstipated will add lactulose today     8/21 patient was seen and examined bedside  Denies pain or nausea vomiting.    Discussed with SLP,  recommended soft diet and outpatient GI follow-up for history of GERD/dysphagia  I consulted psych for her depressed mood      PT/OT will need placement   I have discussed this patient's plan of care and discharge plan at IDT rounds today with Case Management, Nursing, Nursing leadership, and other members of the IDT team.    Consultants/Specialty  None     Code Status  DNAR/DNI    Disposition  The patient is not medically cleared for discharge to home or a post-acute facility.      I have placed the appropriate orders for post-discharge needs.    Review of Systems  Review of Systems   Constitutional:  Positive for malaise/fatigue and weight loss. Negative for chills, diaphoresis and fever.   HENT:  Negative for congestion, ear pain, sinus pain and sore throat.    Eyes: Negative.    Respiratory:  Negative for cough, sputum production, shortness of breath and wheezing.    Cardiovascular:  Positive for leg swelling (Reports intermittent leg swelling that resolves after she lays down for a while). Negative for chest pain, palpitations and orthopnea.   Gastrointestinal:  Positive for constipation (Chronic), heartburn (Chronic) and nausea. Negative for abdominal pain, blood in stool and vomiting.   Genitourinary:  Negative for dysuria, flank pain, frequency, hematuria and urgency.   Musculoskeletal:  Positive for falls (Positive for syncope and  collapse). Negative for back pain, myalgias and neck pain.   Skin:  Negative for itching and rash.   Neurological:  Positive for dizziness. Negative for weakness and headaches.   Psychiatric/Behavioral:  Positive for depression. The patient is nervous/anxious.    All other systems reviewed and are negative.       Physical Exam  Temp:  [35.9 °C (96.6 °F)-36.6 °C (97.9 °F)] 35.9 °C (96.6 °F)  Pulse:  [60-86] 72  Resp:  [16-17] 16  BP: (114-135)/(51-59) 114/51  SpO2:  [88 %-97 %] 93 %    Physical Exam  Vitals and nursing note reviewed.   Constitutional:       General: She is not in acute distress.     Appearance: She is not ill-appearing.      Comments: Frail   HENT:      Head: Normocephalic and atraumatic.      Nose: Nose normal. No congestion or rhinorrhea.      Mouth/Throat:      Mouth: Mucous membranes are dry.      Pharynx: Oropharynx is clear.   Eyes:      Extraocular Movements: Extraocular movements intact.      Pupils: Pupils are equal, round, and reactive to light.   Cardiovascular:      Rate and Rhythm: Normal rate and regular rhythm.      Pulses: Normal pulses.      Heart sounds: No murmur heard.     No friction rub. No gallop.   Pulmonary:      Effort: Pulmonary effort is normal. No respiratory distress.      Breath sounds: No wheezing, rhonchi or rales.   Abdominal:      General: Abdomen is flat. There is no distension.      Palpations: Abdomen is soft.      Tenderness: There is no abdominal tenderness. There is no guarding or rebound.   Musculoskeletal:         General: Normal range of motion.      Cervical back: Normal range of motion.      Right lower leg: No edema.      Left lower leg: No edema.   Skin:     General: Skin is warm and dry.      Capillary Refill: Capillary refill takes less than 2 seconds.      Coloration: Skin is not jaundiced.      Findings: No bruising or lesion.   Neurological:      Mental Status: She is alert and oriented to person, place, and time.   Psychiatric:         Mood and  "Affect: Mood normal.         Behavior: Behavior normal.         Fluids    Intake/Output Summary (Last 24 hours) at 8/22/2023 1706  Last data filed at 8/22/2023 0400  Gross per 24 hour   Intake 240 ml   Output 350 ml   Net -110 ml         Laboratory  Recent Labs     08/22/23  0341   WBC 8.7   RBC 3.80*   HEMOGLOBIN 11.9*   HEMATOCRIT 36.1*   MCV 95.0   MCH 31.3   MCHC 33.0   RDW 48.8   PLATELETCT 217   MPV 9.5     Recent Labs     08/22/23  0341   SODIUM 129*   POTASSIUM 4.3   CHLORIDE 94*   CO2 27   GLUCOSE 105*   BUN 60*   CREATININE 1.67*   CALCIUM 8.5                   Imaging  [id number] VIEW   Final Result      No acute process. Large amount of stool.      EC-ECHOCARDIOGRAM COMPLETE W/O CONT   Final Result      DX-CHEST-PORTABLE (1 VIEW)   Final Result      No acute cardiopulmonary abnormality.      CT-CSPINE WITHOUT PLUS RECONS   Final Result      No acute fracture or dislocation of the cervical spine.      Multilevel disc and facet degeneration.      CT-HEAD W/O   Final Result      1.  Chronic microvascular ischemic type changes.   2.  No acute intracranial abnormality.   3.  Right sphenoid sinusitis                Assessment/Plan  * Syncope and collapse- (present on admission)  Assessment & Plan  Episode of syncope and collapse while at the bank today.  2-month history of near syncope.  All episodes associated with nausea, likely vasovagal  No arrhythmias noted on telemetry  Echo revealed LVEF 65% aortic valve sclerosis without stenosis  PT OT    ACP (advance care planning)  Assessment & Plan  I have discussed with patient  regarding advance care planning. Patient is aox 4, with capacity to make medical decisions.  We discussed regarding CODE STATUS, CPR and intubation with mechanical ventilation, discussed regarding risk and benefits. patient decided for DNR/DNI. She said, \"just let me go\". patient CODE STATUS has been updated in [facility] with DNR/DNI as per patient's wishes.  I have spent 17 minutes " discussing and with patient    Atrial fibrillation (HCC)  Assessment & Plan  Restarting back her metoprolol  Also restarting back her pradaxa     JEREMIAH (obstructive sleep apnea)- (present on admission)  Assessment & Plan  Noncompliant with CPAP, as she she says she is unable to sleep with the mask over her face.  She reports waking with extreme fatigue and is tired throughout the day.  Prior to the CPAP, she was using oxygen at night.  Request not to use CPAP here as well  - Supplemental oxygen 2 L at at bedtime  - Encourage follow-up for CPAP adjustment    Hyperglycemia- (present on admission)  Assessment & Plan  Blood sugar mildly elevated.  Reports intermittent peripheral neuropathy, which she was suffering from during my exam  -A1c    Situational stress- (present on admission)  Assessment & Plan  Patient's   recently, and she has not really had a place to live since, and has been living with various daughters and grandchildren.  She has had weight loss of about 30 pounds over the last 6 months, has had months of nausea and now with syncope.  Stress may be contributing to her current symptoms.  -Patient would benefit from case management intervention  -Ativan p.o. as needed     Hypothyroidism- (present on admission)  Assessment & Plan  - Continue Synthroid  -TSH levl    GERD (gastroesophageal reflux disease)- (present on admission)  Assessment & Plan  - Continue Protonix    Discussed with SLP,  recommended soft diet and outpatient GI follow-up for history of GERD/dysphagia    HTN (hypertension)- (present on admission)  Assessment & Plan  BP still not well controlled   Increased amlodipine and starting back metoprolol   Close monitoring and adjust as needed           VTE prophylaxis: Pradaxa    I have performed a physical exam and reviewed and updated ROS and Plan today (2023). In review of yesterday's note (2023), there are no changes except as documented above.

## 2023-08-22 NOTE — CONSULTS
Physical Medicine and Rehabilitation Consultation          Date of initial consultation: 8/22/2023  Consulting provider: Casie Fuentes M.D.  Reason for consultation: assess for acute inpatient rehab appropriateness  LOS: 0 Day(s)    Chief complaint: Probable fall    HPI: The patient is a 82 y.o. right hand dominant female with a past medical history of hypothyroidism, hypertension, CAD, sleep apnea on CPAP intermittently, depression;  who presented on 8/15/2023  1:03 PM with syncope while at the bank.  Patient suddenly felt nauseated and dizzy, then passed out.  Patient previously reported significant life stressors including her  recently dying, followed by 30 pound weight loss.  Patient has been living with different daughters and grandkids but it has not been working out.  Medically, work-up in the ED was negative for acute findings.  CT head and C-spine negative for acute fracture or dislocation.  Hospitalization has been significant for recalcitrant hypertension.  Etiology for syncope is likely vasovagal.    The patient currently reports overall feeling tired.  Patient was up all night last night due to urinary retention.  Patient reports overall she is doing well, and can mobilize easily when she is not so tired.  Patient reports she is staying with family, and would prefer to stay at an independent living facility or assisted living facility, however is living on a fixed income of $1100 a month and she cannot afford it.  We discussed IPR, patient feels she is able to go directly home, does not want to pursue IPR placement.    ROS  Pertinent positives are mentioned in the HPI, all others reviewed and are negative.    Social Hx:  2 SH  2 DENNIS, 16 steps inside  With: Adult children    THERAPY:  Restrictions: Fall risk, swallow precautions  PT: Functional mobility   8/18: Walking 50 feet with four-wheel walker at contact-guard assist    OT: ADLs  8/20: Mod assist lower by dressing and toileting     SLP:    8/21: Soft and bite-size diet with thin liquids    IMAGING:  Abdominal x-ray 8/19/2023  No acute process.  Large amount of stool.    PROCEDURES:  None    PMH:  Past Medical History:   Diagnosis Date    Chronic low back pain 11/10/2010    Esophageal stricture 11/10/2010    GERD (gastroesophageal reflux disease) 11/10/2010    HTN (hypertension) 11/10/2010    Hypothyroidism 11/10/2010       PSH:  Past Surgical History:   Procedure Laterality Date    ABDOMINAL HYSTERECTOMY TOTAL      APPENDECTOMY      CHOLECYSTECTOMY      KNEE ARTHROSCOPY      LAMINOTOMY      ROTATOR CUFF REPAIR         FHX:  Family History   Problem Relation Age of Onset    Cancer Mother         breast    Heart Disease Father 61        mi       Medications:  Current Facility-Administered Medications   Medication Dose    lactulose 20 GM/30ML solution 30 mL  30 mL    senna-docusate (Pericolace Or Senokot S) 8.6-50 MG per tablet 2 Tablet  2 Tablet    polyethylene glycol/lytes (Miralax) PACKET 1 Packet  1 Packet    magnesium hydroxide (Milk Of Magnesia) suspension 30 mL  30 mL    ibuprofen (Motrin) tablet 600 mg  600 mg    amLODIPine (Norvasc) tablet 10 mg  10 mg    dabigatran (Pradaxa) capsule 150 mg  150 mg    metoprolol tartrate (Lopressor) tablet 25 mg  25 mg    prochlorperazine (Compazine) injection 10 mg  10 mg    acetaminophen (Tylenol) tablet 650 mg  650 mg    bisacodyl (Dulcolax) suppository 10 mg  10 mg    hydrALAZINE (Apresoline) injection 10 mg  10 mg    ondansetron (Zofran) syringe/vial injection 4 mg  4 mg    ondansetron (Zofran ODT) dispertab 4 mg  4 mg    LORazepam (Ativan) tablet 0.5 mg  0.5 mg    vitamin D3 (Cholecalciferol) tablet 1,000 Units  1,000 Units    omeprazole (PriLOSEC) capsule 20 mg  20 mg    levothyroxine (Synthroid) tablet 100 mcg  100 mcg       Allergies:  Allergies   Allergen Reactions    Codeine     Pcn [Penicillins]      As a child         Physical Exam:  Vitals: /58   Pulse 60   Temp 36.6 °C (97.9 °F)  "(Temporal)   Resp 16   Ht 1.499 m (4' 11\")   Wt 40.9 kg (90 lb 2.7 oz)   SpO2 92%   Gen: NAD  Head:  NC/AT  Eyes/ Nose/ Mouth: PERRLA, moist mucous membranes  Cardio: RRR, good distal perfusion, warm extremities  Pulm: normal respiratory effort, no cyanosis   Abd: Soft NTND, negative borborygmi   Ext: No peripheral edema. No calf tenderness. No clubbing.    Mental status: answers questions appropriately follows commands  Speech: fluent, no aphasia or dysarthria      Motor:      Upper Extremity  Myotome R L   Shoulder flexion C5 5 5   Elbow flexion C5 5 5   Wrist extension C6 5 5   Elbow extension C7 5 5   Finger flexion C8 5 5   Finger abduction T1 5 5     Lower Extremity Myotome R L   Hip flexion L2 5 5   Knee extension L3 5 5   Ankle dorsiflexion L4 5 5   Toe extension L5 5 5   Ankle plantarflexion S1 5 5     Labs: Reviewed and significant for   Recent Labs     08/22/23  0341   RBC 3.80*   HEMOGLOBIN 11.9*   HEMATOCRIT 36.1*   PLATELETCT 217     Recent Labs     08/22/23  0341   SODIUM 129*   POTASSIUM 4.3   CHLORIDE 94*   CO2 27   GLUCOSE 105*   BUN 60*   CREATININE 1.67*   CALCIUM 8.5     Recent Results (from the past 24 hour(s))   CBC WITHOUT DIFFERENTIAL    Collection Time: 08/22/23  3:41 AM   Result Value Ref Range    WBC 8.7 4.8 - 10.8 K/uL    RBC 3.80 (L) 4.20 - 5.40 M/uL    Hemoglobin 11.9 (L) 12.0 - 16.0 g/dL    Hematocrit 36.1 (L) 37.0 - 47.0 %    MCV 95.0 81.4 - 97.8 fL    MCH 31.3 27.0 - 33.0 pg    MCHC 33.0 32.2 - 35.5 g/dL    RDW 48.8 35.9 - 50.0 fL    Platelet Count 217 164 - 446 K/uL    MPV 9.5 9.0 - 12.9 fL   Basic Metabolic Panel    Collection Time: 08/22/23  3:41 AM   Result Value Ref Range    Sodium 129 (L) 135 - 145 mmol/L    Potassium 4.3 3.6 - 5.5 mmol/L    Chloride 94 (L) 96 - 112 mmol/L    Co2 27 20 - 33 mmol/L    Glucose 105 (H) 65 - 99 mg/dL    Bun 60 (H) 8 - 22 mg/dL    Creatinine 1.67 (H) 0.50 - 1.40 mg/dL    Calcium 8.5 8.5 - 10.5 mg/dL    Anion Gap 8.0 7.0 - 16.0   ESTIMATED GFR "    Collection Time: 08/22/23  3:41 AM   Result Value Ref Range    GFR (CKD-EPI) 30 (A) >60 mL/min/1.73 m 2         ASSESSMENT:  Patient is a 82 y.o. female admitted with vasovagal syncope      [facility] Code / Diagnosis to Support: 0016 - Debility (Non-Cardiac, Non-Pulmonary)    Rehabilitation: Impaired ADLs and mobility  Patient is requesting to go directly home when medically cleared.    Disposition recommendations:  -Patient is interested in IPR.  Recommend home with home health and outpatient therapies  -Patient has no acute fractures or neurologic deficit  -PMR will sign off, please reconsult or reach out via Voalte if further evaluation or medical management is requested      Medical Complexity:    Syncope  -Secondary to vasovagal syndrome  -No acute fractures or neurologic deficits  -Continue PT OT while in house  -Patient preference is to discharge directly home with home health and support from her family    Hypertension amlodipine 10 mg daily  -Amlodipine 10 mg daily  -Metoprolol 25 mg twice daily    Hyponatremia  -Sodium 129  -Maintain sodium greater than 130    KIYA  -Increase creatinine to 1.67 today  -Decrease GFR to 30  -Renally dose medications  -Echevarria for urinary retention management    DVT PPX: Pradaxa      Thank you for allowing us to participate in the care of this patient.     Patient was seen for >80 minutes on unit/floor of which > 50% of time was spent on counseling and coordination of care regarding the above, including prognosis, risk reduction, benefits of treatment, and options for next stage of care.    Tino Jenkins, DO   Physical Medicine and Rehabilitation     Please note that this dictation was created using voice recognition software. I have made every reasonable attempt to correct obvious errors, but there may be errors of grammar and possibly content that I did not discover before finalizing the note.

## 2023-08-22 NOTE — DISCHARGE PLANNING
He has completed POLST. Faxed to [facility] to be scanned and original placed at head of bed.

## 2023-08-23 LAB
ANION GAP SERPL CALC-SCNC: 8 MMOL/L (ref 7–16)
APPEARANCE UR: ABNORMAL
BACTERIA #/AREA URNS HPF: ABNORMAL /HPF
BILIRUB UR QL STRIP.AUTO: NEGATIVE
BUN SERPL-MCNC: 43 MG/DL (ref 8–22)
CALCIUM SERPL-MCNC: 8.5 MG/DL (ref 8.5–10.5)
CHLORIDE SERPL-SCNC: 96 MMOL/L (ref 96–112)
CO2 SERPL-SCNC: 27 MMOL/L (ref 20–33)
COLOR UR: YELLOW
CREAT SERPL-MCNC: 1.05 MG/DL (ref 0.5–1.4)
GFR SERPLBLD CREATININE-BSD FMLA CKD-EPI: 52 ML/MIN/1.73 M 2
GLUCOSE BLD STRIP.AUTO-MCNC: 118 MG/DL (ref 65–99)
GLUCOSE SERPL-MCNC: 132 MG/DL (ref 65–99)
GLUCOSE UR STRIP.AUTO-MCNC: NEGATIVE MG/DL
HYALINE CASTS #/AREA URNS LPF: ABNORMAL /LPF
KETONES UR STRIP.AUTO-MCNC: NEGATIVE MG/DL
LEUKOCYTE ESTERASE UR QL STRIP.AUTO: ABNORMAL
MICRO URNS: ABNORMAL
NITRITE UR QL STRIP.AUTO: NEGATIVE
PH UR STRIP.AUTO: 6 [PH] (ref 5–8)
POTASSIUM SERPL-SCNC: 4.6 MMOL/L (ref 3.6–5.5)
PROT UR QL STRIP: NEGATIVE MG/DL
RBC # URNS HPF: ABNORMAL /HPF
RBC UR QL AUTO: ABNORMAL
SODIUM SERPL-SCNC: 131 MMOL/L (ref 135–145)
SP GR UR STRIP.AUTO: 1.01
UROBILINOGEN UR STRIP.AUTO-MCNC: 0.2 MG/DL
WBC #/AREA URNS HPF: ABNORMAL /HPF

## 2023-08-23 PROCEDURE — 99232 SBSQ HOSP IP/OBS MODERATE 35: CPT | Performed by: STUDENT IN AN ORGANIZED HEALTH CARE EDUCATION/TRAINING PROGRAM

## 2023-08-23 PROCEDURE — 87077 CULTURE AEROBIC IDENTIFY: CPT

## 2023-08-23 PROCEDURE — 700102 HCHG RX REV CODE 250 W/ 637 OVERRIDE(OP): Performed by: NURSE PRACTITIONER

## 2023-08-23 PROCEDURE — 700102 HCHG RX REV CODE 250 W/ 637 OVERRIDE(OP): Performed by: INTERNAL MEDICINE

## 2023-08-23 PROCEDURE — A9270 NON-COVERED ITEM OR SERVICE: HCPCS | Performed by: STUDENT IN AN ORGANIZED HEALTH CARE EDUCATION/TRAINING PROGRAM

## 2023-08-23 PROCEDURE — 99222 1ST HOSP IP/OBS MODERATE 55: CPT | Mod: GC | Performed by: PSYCHIATRY & NEUROLOGY

## 2023-08-23 PROCEDURE — 81001 URINALYSIS AUTO W/SCOPE: CPT

## 2023-08-23 PROCEDURE — 80048 BASIC METABOLIC PNL TOTAL CA: CPT

## 2023-08-23 PROCEDURE — 87086 URINE CULTURE/COLONY COUNT: CPT

## 2023-08-23 PROCEDURE — 700102 HCHG RX REV CODE 250 W/ 637 OVERRIDE(OP): Performed by: STUDENT IN AN ORGANIZED HEALTH CARE EDUCATION/TRAINING PROGRAM

## 2023-08-23 PROCEDURE — A9270 NON-COVERED ITEM OR SERVICE: HCPCS | Performed by: INTERNAL MEDICINE

## 2023-08-23 PROCEDURE — 700111 HCHG RX REV CODE 636 W/ 250 OVERRIDE (IP): Mod: JZ | Performed by: NURSE PRACTITIONER

## 2023-08-23 PROCEDURE — 87186 SC STD MICRODIL/AGAR DIL: CPT

## 2023-08-23 PROCEDURE — 36415 COLL VENOUS BLD VENIPUNCTURE: CPT

## 2023-08-23 PROCEDURE — A9270 NON-COVERED ITEM OR SERVICE: HCPCS | Performed by: NURSE PRACTITIONER

## 2023-08-23 PROCEDURE — 82962 GLUCOSE BLOOD TEST: CPT

## 2023-08-23 PROCEDURE — G0378 HOSPITAL OBSERVATION PER HR: HCPCS

## 2023-08-23 RX ORDER — MIRTAZAPINE 15 MG/1
7.5 TABLET, FILM COATED ORAL
Status: DISCONTINUED | OUTPATIENT
Start: 2023-08-23 | End: 2023-08-29

## 2023-08-23 RX ADMIN — OMEPRAZOLE 20 MG: 20 CAPSULE, DELAYED RELEASE ORAL at 06:08

## 2023-08-23 RX ADMIN — METOPROLOL TARTRATE 25 MG: 25 TABLET, FILM COATED ORAL at 06:11

## 2023-08-23 RX ADMIN — AMLODIPINE BESYLATE 10 MG: 10 TABLET ORAL at 06:12

## 2023-08-23 RX ADMIN — ONDANSETRON 4 MG: 4 TABLET, ORALLY DISINTEGRATING ORAL at 20:43

## 2023-08-23 RX ADMIN — LACTULOSE 30 ML: 20 SOLUTION ORAL at 18:29

## 2023-08-23 RX ADMIN — DABIGATRAN ETEXILATE MESYLATE 75 MG: 75 CAPSULE ORAL at 06:13

## 2023-08-23 RX ADMIN — SENNOSIDES AND DOCUSATE SODIUM 2 TABLET: 50; 8.6 TABLET ORAL at 06:09

## 2023-08-23 RX ADMIN — OMEPRAZOLE 20 MG: 20 CAPSULE, DELAYED RELEASE ORAL at 18:29

## 2023-08-23 RX ADMIN — LEVOTHYROXINE SODIUM 100 MCG: 0.1 TABLET ORAL at 06:08

## 2023-08-23 RX ADMIN — SENNOSIDES AND DOCUSATE SODIUM 2 TABLET: 50; 8.6 TABLET ORAL at 18:29

## 2023-08-23 RX ADMIN — DABIGATRAN ETEXILATE MESYLATE 75 MG: 75 CAPSULE ORAL at 18:29

## 2023-08-23 RX ADMIN — MIRTAZAPINE 7.5 MG: 15 TABLET, FILM COATED ORAL at 22:40

## 2023-08-23 RX ADMIN — Medication 1000 UNITS: at 06:09

## 2023-08-23 RX ADMIN — METOPROLOL TARTRATE 25 MG: 25 TABLET, FILM COATED ORAL at 18:28

## 2023-08-23 ASSESSMENT — ENCOUNTER SYMPTOMS
BLOOD IN STOOL: 0
BACK PAIN: 0
DIZZINESS: 1
MYALGIAS: 0
VOMITING: 0
ORTHOPNEA: 0
SINUS PAIN: 0
DIAPHORESIS: 0
WHEEZING: 0
SPUTUM PRODUCTION: 0
ABDOMINAL PAIN: 0
WEAKNESS: 1
CHILLS: 0
DEPRESSION: 1
NECK PAIN: 0
HEARTBURN: 1
NERVOUS/ANXIOUS: 1
CONSTIPATION: 1
FALLS: 1
SORE THROAT: 0
BACK PAIN: 1
SHORTNESS OF BREATH: 0
HEADACHES: 0
EYES NEGATIVE: 1
WEAKNESS: 0
FLANK PAIN: 0
PALPITATIONS: 0
COUGH: 0
NAUSEA: 1
WEIGHT LOSS: 1
FEVER: 0

## 2023-08-23 ASSESSMENT — PAIN DESCRIPTION - PAIN TYPE
TYPE: ACUTE PAIN
TYPE: ACUTE PAIN

## 2023-08-23 NOTE — PROGRESS NOTES
This RN messaged Dr. Jason. Pt has discharge orders in. This RN was wondering if MD could possibly put in a hospice or palliative consult in. This RN does not believe that it is safe for the pt to go home.

## 2023-08-23 NOTE — DISCHARGE PLANNING
Discussed in rounds. Rehab has declined. Unable to go to SNF as in OBV status. Pt currently not safe to discharge to home alone. Lives with family who work outside the home during the day. Psych eval pending due to depression. May need to consider hospice. Await psych eval.

## 2023-08-23 NOTE — PROGRESS NOTES
[facility] Medicine Daily Progress Note    Date of Service  2023    Chief Complaint  Felipa Chavez is a 82 y.o. female admitted 8/15/2023 with fall     Hospital Course  This is a 81 y/o F with PMHX of hypothyroidism, hypertension, CAD, JEREMIAH with intermittent use of CPAP and depression who presented to the ED on 8/15/2023 after having an episode of syncope and collapse.  she was at the bank, she suddenly felt nauseated and dizzy and then passed out.  She recalls lying on the floor feeling confused, unclear what happened. She denies experiencing anything other than dizziness or nausea prior to falling, denies lightheadedness, chest pain, palpitations, shortness of breath, or diaphoresis  She reports significant stress since her   recently and has lost 30 pounds over the last 6-month since he passed away.  She has since had to live with different daughters and grandkids, but this has not been working out, and she has been significantly depressed over feeling like she has no place to live. She has not seen a provider since last February or March when she lived in [place].  She is currently living locally with her granddaughter, but home life is incredibly stressful for her.  She reports of being compliant with her medications.    CT head  obtained in the ED is negative for any acute findings, but shows right sphenoid sinusitis.  CT of her cervical spine is negative for acute fracture or dislocation, but does show multilevel disc and facet degeneration.  Chest x-ray is negative  Patient admitted for further work up and treatment     : starting back metoprolol for her A.fibb restarting pradaxa.  Discussed with SLP,  recommended soft diet and outpatient GI follow-up for history of GERD/dysphagia    Interval Problem Update    Reported abdominal pain, discomforts with urination.  I ordered UA  Denies pain or nausea vomiting.      I discussed with psych for her depressed mood, pending  recommendations      PT/OT will need placement, declined by rehab, no SNF acceptance yet    I have discussed this patient's plan of care and discharge plan at IDT rounds today with Case Management, Nursing, Nursing leadership, and other members of the IDT team.    Consultants/Specialty  None     Code Status  DNAR/DNI    Disposition  The patient is medically cleared for discharge to home or a post-acute facility.      I have placed the appropriate orders for post-discharge needs.    Review of Systems  Review of Systems   Constitutional:  Positive for malaise/fatigue and weight loss. Negative for chills, diaphoresis and fever.   HENT:  Negative for congestion, ear pain, sinus pain and sore throat.    Eyes: Negative.    Respiratory:  Negative for cough, sputum production, shortness of breath and wheezing.    Cardiovascular:  Positive for leg swelling (Reports intermittent leg swelling that resolves after she lays down for a while). Negative for chest pain, palpitations and orthopnea.   Gastrointestinal:  Positive for constipation (Chronic), heartburn (Chronic) and nausea. Negative for abdominal pain, blood in stool and vomiting.   Genitourinary:  Negative for dysuria, flank pain, frequency, hematuria and urgency.   Musculoskeletal:  Positive for falls (Positive for syncope and collapse). Negative for back pain, myalgias and neck pain.   Skin:  Negative for itching and rash.   Neurological:  Positive for dizziness. Negative for weakness and headaches.   Psychiatric/Behavioral:  Positive for depression. The patient is nervous/anxious.    All other systems reviewed and are negative.       Physical Exam  Temp:  [35.9 °C (96.6 °F)-37 °C (98.6 °F)] 37 °C (98.6 °F)  Pulse:  [53-81] 69  Resp:  [16-18] 17  BP: ()/(49-64) 147/64  SpO2:  [79 %-95 %] 92 %    Physical Exam  Vitals and nursing note reviewed.   Constitutional:       General: She is not in acute distress.     Appearance: She is not ill-appearing.      Comments:  Frail   HENT:      Head: Normocephalic and atraumatic.      Nose: Nose normal. No congestion or rhinorrhea.      Mouth/Throat:      Mouth: Mucous membranes are dry.      Pharynx: Oropharynx is clear.   Eyes:      Extraocular Movements: Extraocular movements intact.      Pupils: Pupils are equal, round, and reactive to light.   Cardiovascular:      Rate and Rhythm: Normal rate and regular rhythm.      Pulses: Normal pulses.      Heart sounds: No murmur heard.     No friction rub. No gallop.   Pulmonary:      Effort: Pulmonary effort is normal. No respiratory distress.      Breath sounds: No wheezing, rhonchi or rales.   Abdominal:      General: Abdomen is flat. There is no distension.      Palpations: Abdomen is soft.      Tenderness: There is no abdominal tenderness. There is no guarding or rebound.   Musculoskeletal:         General: Normal range of motion.      Cervical back: Normal range of motion.      Right lower leg: No edema.      Left lower leg: No edema.   Skin:     General: Skin is warm and dry.      Capillary Refill: Capillary refill takes less than 2 seconds.      Coloration: Skin is not jaundiced.      Findings: No bruising or lesion.   Neurological:      Mental Status: She is alert and oriented to person, place, and time.   Psychiatric:         Mood and Affect: Mood normal.         Behavior: Behavior normal.         Fluids    Intake/Output Summary (Last 24 hours) at 8/23/2023 1141  Last data filed at 8/22/2023 1500  Gross per 24 hour   Intake 180 ml   Output --   Net 180 ml         Laboratory  Recent Labs     08/22/23  0341   WBC 8.7   RBC 3.80*   HEMOGLOBIN 11.9*   HEMATOCRIT 36.1*   MCV 95.0   MCH 31.3   MCHC 33.0   RDW 48.8   PLATELETCT 217   MPV 9.5     Recent Labs     08/22/23  0341 08/23/23  0407   SODIUM 129* 131*   POTASSIUM 4.3 4.6   CHLORIDE 94* 96   CO2 27 27   GLUCOSE 105* 132*   BUN 60* 43*   CREATININE 1.67* 1.05   CALCIUM 8.5 8.5                   Imaging  [id number] VIEW   Final  "Result      No acute process. Large amount of stool.      EC-ECHOCARDIOGRAM COMPLETE W/O CONT   Final Result      DX-CHEST-PORTABLE (1 VIEW)   Final Result      No acute cardiopulmonary abnormality.      CT-CSPINE WITHOUT PLUS RECONS   Final Result      No acute fracture or dislocation of the cervical spine.      Multilevel disc and facet degeneration.      CT-HEAD W/O   Final Result      1.  Chronic microvascular ischemic type changes.   2.  No acute intracranial abnormality.   3.  Right sphenoid sinusitis                Assessment/Plan  * Syncope and collapse- (present on admission)  Assessment & Plan  Episode of syncope and collapse while at the bank today.  2-month history of near syncope.  All episodes associated with nausea, likely vasovagal  No arrhythmias noted on telemetry  Echo revealed LVEF 65% aortic valve sclerosis without stenosis  PT OT    ACP (advance care planning)  Assessment & Plan  I have discussed with patient  regarding advance care planning. Patient is aox 4, with capacity to make medical decisions.  We discussed regarding CODE STATUS, CPR and intubation with mechanical ventilation, discussed regarding risk and benefits. patient decided for DNR/DNI. She said, \"just let me go\". patient CODE STATUS has [facility] with DNR/DNI as per patient's wishes.  I have spent 17 minutes discussing and with patient    Atrial fibrillation (HCC)  Assessment & Plan  Restarting back her metoprolol  Also restarting back her pradaxa     JEREMIAH (obstructive sleep apnea)- (present on admission)  Assessment & Plan  Noncompliant with CPAP, as she she says she is unable to sleep with the mask over her face.  She reports waking with extreme fatigue and is tired throughout the day.  Prior to the CPAP, she was using oxygen at night.  Request not to use CPAP here as well  - Supplemental oxygen 2 L at at bedtime  - Encourage follow-up for CPAP adjustment    Hyperglycemia- (present on admission)  Assessment & " Plan  Blood sugar mildly elevated.  Reports intermittent peripheral neuropathy, which she was suffering from during my exam  -A1c    Situational stress- (present on admission)  Assessment & Plan  Patient's   recently, and she has not really had a place to live since, and has been living with various daughters and grandchildren.  She has had weight loss of about 30 pounds over the last 6 months, has had months of nausea and now with syncope.  Stress may be contributing to her current symptoms.  -Patient would benefit from case management intervention  -Ativan p.o. as needed     Hypothyroidism- (present on admission)  Assessment & Plan  - Continue Synthroid  -TSH levl    GERD (gastroesophageal reflux disease)- (present on admission)  Assessment & Plan  - Continue Protonix    Discussed with SLP,  recommended soft diet and outpatient GI follow-up for history of GERD/dysphagia    HTN (hypertension)- (present on admission)  Assessment & Plan  BP still not well controlled   Increased amlodipine and starting back metoprolol   Close monitoring and adjust as needed           VTE prophylaxis: Pradaxa    I have performed a physical exam and reviewed and updated ROS and Plan today (2023). In review of yesterday's note (2023), there are no changes except as documented above.
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
## 2023-08-23 NOTE — CARE PLAN
The patient is Stable - Low risk of patient condition declining or worsening    Shift Goals  Clinical Goals: patefy saftey, mobility, skin intergrity  Patient Goals: rest  Family Goals: irma    Progress made toward(s) clinical / shift goals:         Problem: Fall Risk  Goal: Patient will remain free from falls  Description: Target End Date:  Prior to discharge or change in level of care    Document interventions on the Abigail Hilario Fall Risk Assessment    1.  Assess for fall risk factors  2.  Implement fall precautions  Outcome: Progressing     Problem: Pain - Standard  Goal: Alleviation of pain or a reduction in pain to the patient’s comfort goal  Description: Target End Date:  Prior to discharge or change in level of care    Document on Vitals flowsheet    1.  Document pain using the appropriate pain scale per order or unit policy  2.  Educate and implement non-pharmacologic comfort measures (i.e. relaxation, distraction, massage, cold/heat therapy, etc.)  3.  Pain management medications as ordered  4.  Reassess pain after pain med administration per policy  5.  If opiods administered assess patient's response to pain medication is appropriate per POSS sedation scale  6.  Follow pain management plan developed in collaboration with patient and interdisciplinary team (including palliative care or pain specialists if applicable)  Outcome: Progressing     Problem: Skin Integrity  Goal: Skin integrity is maintained or improved  Description: Target End Date:  Prior to discharge or change in level of care    Document interventions on Skin Risk/Vikram flowsheet groups and corresponding LDA    1.  Assess and monitor skin integrity, appearance and/or temperature  2.  Assess risk factors for impaired skin integrity and/or pressures ulcers  3.  Implement precautions to protect skin integrity in collaboration with interdisciplinary team  4.  Implement pressure ulcer prevention protocol if at risk for skin breakdown  5.  Confirm  wound care consult if at risk for skin breakdown  6.  Ensure patient use of pressure relieving devices  (Low air loss bed, waffle overlay, heel protectors, ROHO cushion, etc)  Outcome: Progressing       Patient is not progressing towards the following goals:      Problem: Knowledge Deficit - Standard  Goal: Patient and family/care givers will demonstrate understanding of plan of care, disease process/condition, diagnostic tests and medications  Description: Target End Date:  1-3 days or as soon as patient condition allows    Document in Patient Education    1.  Patient and family/caregiver oriented to unit, equipment, visitation policy and means for communicating concern  2.  Complete/review Learning Assessment  3.  Assess knowledge level of disease process/condition, treatment plan, diagnostic tests and medications  4.  Explain disease process/condition, treatment plan, diagnostic tests and medications  Outcome: Not Progressing     Problem: Depression  Goal: Patient and family/caregiver will verbalize accurate information about at least two of the possible causes of depression, three-four of the signs and symptoms of depression  Description: Target End Date:  1 to 3 days    1.  Assess the patient's and family/caregiver's knowledge regarding depression and its causes.  2.  Explain to the patient and family/caregiver regarding the major symptoms of depression.  3.  Inform the patient and family/caregiver that depression can be treated through medications and psychotherapy.  4.  Allow the patient to express feelings and perceptions  5.  Express hope to the patient with realistic comments about the patient's strengths and resources.  5.  Give positive feedback after a tasks are achieved.  6.  Encourage identification of positive aspects of self.  7.  Educate the patient about crisis intervention services such as suicide hotlines and other resources.  Outcome: Not Progressing

## 2023-08-23 NOTE — THERAPY
"Physical Therapy   Daily Treatment     Patient Name: Felipa Chavez  Age:  82 y.o., Sex:  female  Medical Record #: [id number]  Today's Date: 8/22/2023     Precautions  Precautions: Fall Risk;Swallow Precautions    Assessment    Pt stated that she just wanted to sleep. \"I've gotten so weak.\" \"They keep telling me my blood pressure is excellent.\" Attempted to perform orthostatics with pt sitting at EOB. HOB at 45 degrees, BP 99/49. Pt started to initiate to EOB and then stopped, closed eyes, this occurred multiple times. She attempted to pro herself up to get trunk towards EOB and then laid back down. Pt stated to tired. Assisted pt rollling in bed to readjust linen. Supine /56. Notified RN of pt's BP. Pt c/o continous runny nose. Notified RN. Asked RN about if she is having assistance with pt's depression and RN stated she is going to be evaluated for it. Pt will continue to benefit from acute physical therapy to assist towards established goals. Anticipate pt will benefit from post acute placement upon DC from hospital.         Plan    Treatment Plan Status: Continue Current Treatment Plan  Type of Treatment: Bed Mobility, Debridement, Equipment, Group Therapy, Gait Training, Orthotics Training , Neuro Re-Education / Balance, Manual Therapy, Self Care / Home Evaluation, Stair Training, Therapeutic Activities, Therapeutic Exercise  Treatment Frequency: 4 Times per Week  Treatment Duration: Until Therapy Goals Met    DC Equipment Recommendations: None  Discharge Recommendations: Recommend post-acute placement for additional physical therapy services prior to discharge home      Subjective    Attempted to assist pt earlier today at 1445 and pt requested to sleep. Entered room later and pt with HOB up. Pt reluctant about getting up. Educated pt about attempting orthostatic Blood pressures and pt agreed but too lethargic to complete task to EOB.     Objective       08/22/23 1704   Precautions   Precautions Fall " Risk;Swallow Precautions   Pain 0 - 10 Group   Therapist Pain Assessment   (pt did not c/o pain during treatment)   Cognition    Level of Consciousness Alert   Comments pt lethargic, dealyed, mumbles   Other Treatments   Other Treatments Provided Orthostatic BP- pt found with HOB 45 degrees BP 99/49 HR 74, Supine 121/56 HR 69- notified RN   Balance   Sitting Balance (Static) Poor   Bed Mobility    Rolling Contact Guard Assist  (with use of bed rail, slow)   Skilled Intervention Verbal Cuing;Facilitation   Comments attempted to assist pt to EOB, pt able to slide LE a little bit over and then attempted to push her self up but then laid back down. This effort took her 10 minutes to initiate, then pt was lethargic. Pt into supine to assist with linen management.   Gait Analysis   Gait Level Of Assist Unable to Participate   Functional Mobility   Sit to Stand Unable to Participate   How much difficulty does the patient currently have...   Turning over in bed (including adjusting bedclothes, sheets and blankets)? 1   Sitting down on and standing up from a chair with arms (e.g., wheelchair, bedside commode, etc.) 1   Moving from lying on back to sitting on the side of the bed? 1   How much help from another person does the patient currently need...   Moving to and from a bed to a chair (including a wheelchair)? 1   Need to walk in a hospital room? 1   Climbing 3-5 steps with a railing? 1   6 clicks Mobility Score 6   Activity Tolerance   Comments pt u/a to sit up at EOB due to fatigue, lethargy.   Short Term Goals    Short Term Goal # 1 Pt will perform sit<>supine with supervision   Goal Outcome # 1 Progressing slower than expected   Short Term Goal # 2 Pt will perform sit<>stand and stand pivot transfers with 4WW and supervision.   Goal Outcome # 2 Progressing slower than expected   Short Term Goal # 3 Pt will ambulate 100' with 4WW and supervision.   Goal Outcome # 3 Progressing slower than expected   Short Term Goal # 4  Pt will perform 16 stairs with supervision.   Goal Outcome # 4 Progressing slower than expected   Education Group   Education Provided Role of Physical Therapist  (education on orthostatic BP)   Role of Physical Therapist Patient Response Patient;Acceptance;Explanation;Demonstration;Verbal Demonstration   Physical Therapy Treatment Plan   Physical Therapy Treatment Plan Continue Current Treatment Plan   Treatment Plan  Bed Mobility;Debridement;Equipment;Group Therapy;Gait Training;Orthotics Training ;Neuro Re-Education / Balance;Manual Therapy;Self Care / Home Evaluation;Stair Training;Therapeutic Activities;Therapeutic Exercise   Treatment Frequency 4 Times per Week   Duration Until Therapy Goals Met   Anticipated Discharge Equipment and Recommendations   DC Equipment Recommendations None   Discharge Recommendations Recommend post-acute placement for additional physical therapy services prior to discharge home   Interdisciplinary Plan of Care Collaboration   IDT Collaboration with  Nursing   Patient Position at End of Therapy In Bed;Phone within Reach;Tray Table within Reach;Call Light within Reach

## 2023-08-24 PROBLEM — F32.A DEPRESSION: Status: ACTIVE | Noted: 2023-08-24

## 2023-08-24 PROBLEM — N30.00 ACUTE CYSTITIS WITHOUT HEMATURIA: Status: ACTIVE | Noted: 2023-08-24

## 2023-08-24 PROBLEM — F32.9 MAJOR DEPRESSIVE DISORDER: Status: ACTIVE | Noted: 2023-08-24

## 2023-08-24 LAB
ANION GAP SERPL CALC-SCNC: 8 MMOL/L (ref 7–16)
BUN SERPL-MCNC: 30 MG/DL (ref 8–22)
CALCIUM SERPL-MCNC: 8.9 MG/DL (ref 8.5–10.5)
CHLORIDE SERPL-SCNC: 96 MMOL/L (ref 96–112)
CO2 SERPL-SCNC: 28 MMOL/L (ref 20–33)
CREAT SERPL-MCNC: 0.98 MG/DL (ref 0.5–1.4)
ERYTHROCYTE [DISTWIDTH] IN BLOOD BY AUTOMATED COUNT: 47.6 FL (ref 35.9–50)
GFR SERPLBLD CREATININE-BSD FMLA CKD-EPI: 57 ML/MIN/1.73 M 2
GLUCOSE SERPL-MCNC: 131 MG/DL (ref 65–99)
HCT VFR BLD AUTO: 36.6 % (ref 37–47)
HGB BLD-MCNC: 12 G/DL (ref 12–16)
MAGNESIUM SERPL-MCNC: 2.2 MG/DL (ref 1.5–2.5)
MCH RBC QN AUTO: 31.3 PG (ref 27–33)
MCHC RBC AUTO-ENTMCNC: 32.8 G/DL (ref 32.2–35.5)
MCV RBC AUTO: 95.6 FL (ref 81.4–97.8)
PHOSPHATE SERPL-MCNC: 3.2 MG/DL (ref 2.5–4.5)
PLATELET # BLD AUTO: 252 K/UL (ref 164–446)
PMV BLD AUTO: 9.5 FL (ref 9–12.9)
POTASSIUM SERPL-SCNC: 4.2 MMOL/L (ref 3.6–5.5)
RBC # BLD AUTO: 3.83 M/UL (ref 4.2–5.4)
SODIUM SERPL-SCNC: 132 MMOL/L (ref 135–145)
WBC # BLD AUTO: 7.3 K/UL (ref 4.8–10.8)

## 2023-08-24 PROCEDURE — 80048 BASIC METABOLIC PNL TOTAL CA: CPT

## 2023-08-24 PROCEDURE — 99232 SBSQ HOSP IP/OBS MODERATE 35: CPT | Performed by: STUDENT IN AN ORGANIZED HEALTH CARE EDUCATION/TRAINING PROGRAM

## 2023-08-24 PROCEDURE — A9270 NON-COVERED ITEM OR SERVICE: HCPCS | Performed by: NURSE PRACTITIONER

## 2023-08-24 PROCEDURE — A9270 NON-COVERED ITEM OR SERVICE: HCPCS | Performed by: INTERNAL MEDICINE

## 2023-08-24 PROCEDURE — 700102 HCHG RX REV CODE 250 W/ 637 OVERRIDE(OP): Performed by: INTERNAL MEDICINE

## 2023-08-24 PROCEDURE — 86480 TB TEST CELL IMMUN MEASURE: CPT

## 2023-08-24 PROCEDURE — A9270 NON-COVERED ITEM OR SERVICE: HCPCS | Performed by: STUDENT IN AN ORGANIZED HEALTH CARE EDUCATION/TRAINING PROGRAM

## 2023-08-24 PROCEDURE — 700105 HCHG RX REV CODE 258: Performed by: STUDENT IN AN ORGANIZED HEALTH CARE EDUCATION/TRAINING PROGRAM

## 2023-08-24 PROCEDURE — 700102 HCHG RX REV CODE 250 W/ 637 OVERRIDE(OP): Performed by: STUDENT IN AN ORGANIZED HEALTH CARE EDUCATION/TRAINING PROGRAM

## 2023-08-24 PROCEDURE — 84100 ASSAY OF PHOSPHORUS: CPT

## 2023-08-24 PROCEDURE — G0378 HOSPITAL OBSERVATION PER HR: HCPCS

## 2023-08-24 PROCEDURE — 36415 COLL VENOUS BLD VENIPUNCTURE: CPT

## 2023-08-24 PROCEDURE — 700102 HCHG RX REV CODE 250 W/ 637 OVERRIDE(OP): Performed by: NURSE PRACTITIONER

## 2023-08-24 PROCEDURE — 83735 ASSAY OF MAGNESIUM: CPT

## 2023-08-24 PROCEDURE — 85027 COMPLETE CBC AUTOMATED: CPT

## 2023-08-24 RX ORDER — CEFDINIR 300 MG/1
300 CAPSULE ORAL
Status: DISCONTINUED | OUTPATIENT
Start: 2023-08-24 | End: 2023-08-26

## 2023-08-24 RX ORDER — CEPHALEXIN 500 MG/1
500 CAPSULE ORAL 3 TIMES DAILY
Status: DISCONTINUED | OUTPATIENT
Start: 2023-08-24 | End: 2023-08-24

## 2023-08-24 RX ORDER — CEPHALEXIN 500 MG/1
500 CAPSULE ORAL 4 TIMES DAILY
Status: DISCONTINUED | OUTPATIENT
Start: 2023-08-24 | End: 2023-08-24

## 2023-08-24 RX ORDER — SODIUM CHLORIDE 9 MG/ML
INJECTION, SOLUTION INTRAVENOUS CONTINUOUS
Status: DISCONTINUED | OUTPATIENT
Start: 2023-08-24 | End: 2023-08-26

## 2023-08-24 RX ADMIN — MIRTAZAPINE 7.5 MG: 15 TABLET, FILM COATED ORAL at 20:35

## 2023-08-24 RX ADMIN — AMLODIPINE BESYLATE 10 MG: 10 TABLET ORAL at 06:39

## 2023-08-24 RX ADMIN — METOPROLOL TARTRATE 25 MG: 25 TABLET, FILM COATED ORAL at 06:39

## 2023-08-24 RX ADMIN — Medication 1000 UNITS: at 06:39

## 2023-08-24 RX ADMIN — SENNOSIDES AND DOCUSATE SODIUM 2 TABLET: 50; 8.6 TABLET ORAL at 17:55

## 2023-08-24 RX ADMIN — LACTULOSE 30 ML: 20 SOLUTION ORAL at 11:48

## 2023-08-24 RX ADMIN — DABIGATRAN ETEXILATE MESYLATE 75 MG: 75 CAPSULE ORAL at 06:40

## 2023-08-24 RX ADMIN — OMEPRAZOLE 20 MG: 20 CAPSULE, DELAYED RELEASE ORAL at 17:55

## 2023-08-24 RX ADMIN — METOPROLOL TARTRATE 25 MG: 25 TABLET, FILM COATED ORAL at 17:54

## 2023-08-24 RX ADMIN — SODIUM CHLORIDE: 9 INJECTION, SOLUTION INTRAVENOUS at 18:01

## 2023-08-24 RX ADMIN — CEFDINIR 300 MG: 300 CAPSULE ORAL at 11:48

## 2023-08-24 RX ADMIN — DABIGATRAN ETEXILATE MESYLATE 75 MG: 75 CAPSULE ORAL at 17:54

## 2023-08-24 RX ADMIN — LEVOTHYROXINE SODIUM 100 MCG: 0.1 TABLET ORAL at 06:39

## 2023-08-24 RX ADMIN — ACETAMINOPHEN 650 MG: 325 TABLET, FILM COATED ORAL at 02:03

## 2023-08-24 ASSESSMENT — ENCOUNTER SYMPTOMS
CHILLS: 0
HEARTBURN: 1
DIAPHORESIS: 0
NERVOUS/ANXIOUS: 1
EYES NEGATIVE: 1
SINUS PAIN: 0
PALPITATIONS: 0
WEIGHT LOSS: 1
BLOOD IN STOOL: 0
HEADACHES: 0
VOMITING: 0
FLANK PAIN: 0
SORE THROAT: 0
MYALGIAS: 0
ABDOMINAL PAIN: 0
ORTHOPNEA: 0
SPUTUM PRODUCTION: 0
WEAKNESS: 0
BACK PAIN: 0
DIZZINESS: 1
NECK PAIN: 0
CONSTIPATION: 1
WHEEZING: 0
DEPRESSION: 1
SHORTNESS OF BREATH: 0
FEVER: 0
FALLS: 1
NAUSEA: 1
COUGH: 0

## 2023-08-24 ASSESSMENT — PAIN DESCRIPTION - PAIN TYPE
TYPE: ACUTE PAIN
TYPE: ACUTE PAIN

## 2023-08-24 NOTE — PROGRESS NOTES
0705 Patient's in bed. Bedside report received from ROBB Burr RN at the beginning of the shift.     0729 New order received and acknowledged from Dr Alfredo sutton.    0855 Patient's in bed. Educated on the importance/use/use of IS at least 10x every hour while awake, able to reach 500. Q 2 hour turn in effect. Fall protocol in effect. Call light within reach. Reminded patient to call for assist. Assessment completed. No distress noted. Plan of care reviewed with the patient. Verbalized understanding.    0916 New order receive and acknowledged from Dr Fuentes - see MAR.     1150 Patient's in bed. No distress noted.     1310 Patient' in bed. No distress noted.     1357 Voalted Dr Fuentes that patient's granddaughter wanted to speak with her.    1508 Per Dr Fuentes she already called the granddaughter.    1510 Patient's in bed. No distress noted.     1515 New order received and acknowledged from Dr Alfredo sutton and see MAR.    1736 Voalted Dr Fuentes that patient and granddaughter would like  to speak face to face. Per MD is meeting with other patient and Okay to meet tomorrow. Granddaughter agreeable and would like to meet tomorrow at 1230.    1750 Patient's in bed. No distress noted. Granddaughter visiting.     1910 Patient's in bed. No changes in status. Bedside report given to Yoav ZAMUDIO RN (Aminah).

## 2023-08-24 NOTE — DISCHARGE PLANNING
Case Management Discharge Planning    Admission Date: 8/15/2023  GMLOS:    ALOS: 0    6-Clicks ADL Score: 17  6-Clicks Mobility Score: 6  PT and/or OT Eval ordered: Yes  Post-acute Referrals Ordered: Yes  Post-acute Choice Obtained: Yes  Has referral(s) been sent to post-acute provider:  Yes      Anticipated Discharge Dispo: Discharge Disposition: Discharged to home/self care (01)    DME Needed: No    Action(s) Taken: Updated Provider/Nurse on Discharge Plan    Escalations Completed: Long Length of Stay Committee  and Leadership    Medically Clear: No    Next Steps: Rehab has denied, cant got to SNF(OBV status.) Met with pt to discuss GH, she reluctantly agreed to this plan. Will look into s with plan for [facility] with transition to GH waiver. Pt's monthly SSI is $1000.00.   Called Edd , he will come to [place] pt.  Called Denver Luciano , left  message requesting return call.    Barriers to Discharge: Pending Placement    Is the patient up for discharge tomorrow: No

## 2023-08-24 NOTE — PROGRESS NOTES
4 Eyes Skin Assessment Completed by MANI Burr and MANI White.    Head WDL  Ears WDL  Nose WDL  Mouth WDL  Neck Redness and Blanching  Breast/Chest Redness and Blanching  Shoulder Blades WDL  Spine WDL  (R) Arm/Elbow/Hand WDL  (L) Arm/Elbow/Hand WDL  Abdomen Scar  Groin WDL  Scrotum/Coccyx/Buttocks WDL  (R) Leg WDL  (L) Leg Scar  (R) Heel/Foot/Toe Redness, Blanching, and Boggy  (L) Heel/Foot/Toe Redness, Blanching, and Boggy          Devices In Places Blood Pressure Cuff and Pulse Ox      Interventions In Place Waffle Overlay    Possible Skin Injury No    Pictures Uploaded Into Epic N/A  Wound Consult Placed N/A  RN Wound Prevention Protocol Ordered Yes

## 2023-08-24 NOTE — ASSESSMENT & PLAN NOTE
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

## 2023-08-24 NOTE — ASSESSMENT & PLAN NOTE
Due to grief of of loss of  and home  Psychiatry consulted and recommended mirtazapine with psychotherapy  Declined psychology consult  Citalopram discontinued and increased mirtazapine per psychiatry recommendation  Outpatient follow up with Dr. Miramontes scheduled    08/29/23  Discussed with psychiatry.  Increasing mirtazapine.  Discontinue citalopram.    08/31/23  Discussed with psychiatry.  Continuing mirtazapine.  Recommending starting Ritalin.  Ordered for tomorrow.

## 2023-08-24 NOTE — CARE PLAN
Problem: Knowledge Deficit - Standard  Goal: Patient and family/care givers will demonstrate understanding of plan of care, disease process/condition, diagnostic tests and medications  Outcome: Progressing     Problem: Fall Risk  Goal: Patient will remain free from falls  Outcome: Progressing     Problem: Depression  Goal: Patient and family/caregiver will verbalize accurate information about at least two of the possible causes of depression, three-four of the signs and symptoms of depression  Outcome: Progressing     Problem: Pain - Standard  Goal: Alleviation of pain or a reduction in pain to the patient’s comfort goal  Outcome: Progressing     Problem: Skin Integrity  Goal: Skin integrity is maintained or improved  Outcome: Progressing   The patient is Stable - Low risk of patient condition declining or worsening    Shift Goals  Clinical Goals: Keep pt safe, reposition pt  Patient Goals: rest  Family Goals: VILLA    Progress made toward(s) clinical / shift goals:    Pt is getting up to the commode to use the bathroom.    Patient is not progressing towards the following goals:  Pt is pretty withdrawn. Pt does not have much of an appetite. Pt has been lethargic through most of the day. Pt did get a little dizzy when using bed side commode one time. Vital signs were stable at the time.

## 2023-08-24 NOTE — CARE PLAN
The patient is Stable - Low risk of patient condition declining or worsening    Shift Goals  Clinical Goals: Safety, q 2 turns  Patient Goals: rest  Family Goals: Not present    Progress made toward(s) clinical / shift goals:    Problem: Fall Risk  Goal: Patient will remain free from falls  Outcome: Progressing  Note: Patient is high risk for falls, bed alarm is on, room close to nursing station.      Problem: Depression  Goal: Patient and family/caregiver will verbalize accurate information about at least two of the possible causes of depression, three-four of the signs and symptoms of depression  Outcome: Progressing  Note: Patient medicated per MAR.      Problem: Pain - Standard  Goal: Alleviation of pain or a reduction in pain to the patient’s comfort goal  Outcome: Progressing     Problem: Skin Integrity  Goal: Skin integrity is maintained or improved  Outcome: Progressing  Note: Patient is moderate risk for skin breakdown, patient is on waffle overlay, barrier paste in use. Patient notifies us when she needs to use the restroom.        Patient is not progressing towards the following goals:

## 2023-08-24 NOTE — THERAPY
Speech Language Therapy Contact Note    Patient Name: Felipa Chavez  Age:  82 y.o., Sex:  female  Medical Record #: [id number]  Today's Date: 8/24/2023    SLP attempted to see the patient for dysphagia Tx. Pt was asleep upon arrival; However was awoken with verbal cues. Despite moderate levels of encouragement, pt refusing Tx today. RN aware of missed visit. SLP to follow-up as scheduling and patient availability permits.        08/24/23 1034   Treatment Variance   Reason For Missed Therapy Non-Medical - Patient Refused   Total Treatment Time   SLP Time Spent Yes   SLP Missed Visit (Mins) 5   Interdisciplinary Plan of Care Collaboration   IDT Collaboration with  Nursing   Collaboration Comments RN aware of missed visit

## 2023-08-24 NOTE — PROGRESS NOTES
4 Eyes Skin Assessment Completed by Dora RN and Xin RN.    Head WDL  Ears WDL  Nose WDL  Mouth WDL  Neck WDL  Breast/Chest WDL  Shoulder Blades WDL  Spine WDL  (R) Arm/Elbow/Hand Bruising hand  (L) Arm/Elbow/Hand WDL  Abdomen Scar and Bruising lower abdomen  Groin WDL  Scrotum/Coccyx/Buttocks Redness and Blanching cream applied  (R) Leg WDL  (L) Leg WDL  (R) Heel/Foot/Toe Redness, Blanching, and Boggy heel mepilex placed  (L) Heel/Foot/Toe Redness, Blanching, and Boggy heel mepilex placed          Devices In Places Pulse Ox and SCD's      Interventions In Place Heel Mepilex, Waffle Overlay, Q2 Turns, Barrier Cream, Dri-Mark Pads, and Pressure Redistribution Mattress, wedges placed    Possible Skin Injury No    Pictures Uploaded Into Epic N/A  Wound Consult Placed N/A  RN Wound Prevention Protocol Ordered No

## 2023-08-24 NOTE — PROGRESS NOTES
Hospital Medicine Daily Progress Note    Date of Service  2023    Chief Complaint  Felipa Chavez is a 82 y.o. female admitted 8/15/2023 with fall     Hospital Course  This is a 83 y/o F with PMHX of hypothyroidism, hypertension, CAD, JEREMIAH with intermittent use of CPAP and depression who presented to the ED on 8/15/2023 after having an episode of syncope and collapse.  [place] the [place], she suddenly felt nauseated and dizzy and then passed out.  She recalls lying on the floor feeling confused, unclear what happened. She reports significant stress since her   recently and has lost 30 pounds over the last 6-month since he passed away.  She has since had to live with different daughters and grandkids, but this has not been working out, and she has been significantly depressed over feeling like she has no place to live. She has not seen a provider since last February or March when she lived in [place].  She is currently living locally with her granddaughter, but home life is incredibly stressful for her.  She reports of being compliant with her medications.    CT head  obtained in the ED is negative for any acute findings, but shows right sphenoid sinusitis.  CT of her cervical spine is negative for acute fracture or dislocation, but does show multilevel disc and facet degeneration.  Chest x-ray is negative    : starting back metoprolol for her A.fibb and pradaxa.  Discussed with SLP,  recommended soft diet and outpatient GI follow-up for history of GERD/dysphagia    Interval Problem Update    UA positive for UTI, symptomatic  I ordered a urine culture  I discussed with pharmacy, started on Cefdinir given her allergy to penicillin    I discussed with psych for her depressed mood, started on Remeron, psych will follow    PT/OT will need placement, declined by rehab and SNFs, not safe to discharge home, may need to group home?      I had a lengthy discussion with the granddaughter Airam on the phone.   The granddaughter apparently very upset and blamed the hospital ignored her grandma's needs.  She said that she will bring a team with  and may do biopsy if her grandmother dies here. she said her grandma has not been eating well and kept losing weight.  I tried to explain to her that her grandma has been depressed and we have psychiatrist on the case.  We started a new medication for her appetite and depressed mood. however, the granddaughter does not believe her grandma is depressed and said the patient was in very good spirit before she came here.  I told her the patient herself told me multiple times that she was depressed. I explained to her the mood can be changed especially with significant family events and her hx of depression. I told her she can discuss more with the psychiatrist regarding the depression. I discussed with her nutrition support with possible feeding tube if patient agrees. She said she would discuss with her grandma. She also questioned the diagnosis of hypertension.  I explained to her we are monitoring it with appropriate medications.  I discussed with the psychiatrist regarding patient's request  Discussed with CM    Total time:  52 minutes.  I spent greater than 50% of the time for patient care, counseling, and coordination on this date, including unit/floor time, and face-to-face time with the patient as per interval events and assessment and plan above    I have discussed this patient's plan of care and discharge plan at IDT rounds today with Case Management, Nursing, Nursing leadership, and other members of the IDT team.    Consultants/Specialty  None     Code Status  DNAR/DNI    Disposition  The patient is not medically cleared for discharge to home or a post-acute facility.      I have placed the appropriate orders for post-discharge needs.    Review of Systems  Review of Systems   Constitutional:  Positive for malaise/fatigue and weight loss. Negative for chills,  diaphoresis and fever.   HENT:  Negative for congestion, ear pain, sinus pain and sore throat.    Eyes: Negative.    Respiratory:  Negative for cough, sputum production, shortness of breath and wheezing.    Cardiovascular:  Positive for leg swelling (Reports intermittent leg swelling that resolves after she lays down for a while). Negative for chest pain, palpitations and orthopnea.   Gastrointestinal:  Positive for constipation (Chronic), heartburn (Chronic) and nausea. Negative for abdominal pain, blood in stool and vomiting.   Genitourinary:  Negative for dysuria, flank pain, frequency, hematuria and urgency.   Musculoskeletal:  Positive for falls (Positive for syncope and collapse). Negative for back pain, myalgias and neck pain.   Skin:  Negative for itching and rash.   Neurological:  Positive for dizziness. Negative for weakness and headaches.   Psychiatric/Behavioral:  Positive for depression. The patient is nervous/anxious.    All other systems reviewed and are negative.       Physical Exam  Temp:  [36.1 °C (97 °F)-36.9 °C (98.4 °F)] 36.6 °C (97.9 °F)  Pulse:  [63-76] 63  Resp:  [16-18] 17  BP: ()/(60-69) 99/60  SpO2:  [92 %-94 %] 94 %    Physical Exam  Vitals and nursing note reviewed.   Constitutional:       General: She is not in acute distress.     Appearance: She is not ill-appearing.      Comments: Frail   HENT:      Head: Normocephalic and atraumatic.      Nose: Nose normal. No congestion or rhinorrhea.      Mouth/Throat:      Mouth: Mucous membranes are dry.      Pharynx: Oropharynx is clear.   Eyes:      Extraocular Movements: Extraocular movements intact.      Pupils: Pupils are equal, round, and reactive to light.   Cardiovascular:      Rate and Rhythm: Normal rate and regular rhythm.      Pulses: Normal pulses.      Heart sounds: No murmur heard.     No friction rub. No gallop.   Pulmonary:      Effort: Pulmonary effort is normal. No respiratory distress.      Breath sounds: No wheezing,  rhonchi or rales.   Abdominal:      General: Abdomen is flat. There is no distension.      Palpations: Abdomen is soft.      Tenderness: There is no abdominal tenderness. There is no guarding or rebound.   Musculoskeletal:         General: Normal range of motion.      Cervical back: Normal range of motion.      Right lower leg: No edema.      Left lower leg: No edema.   Skin:     General: Skin is warm and dry.      Capillary Refill: Capillary refill takes less than 2 seconds.      Coloration: Skin is not jaundiced.      Findings: No bruising or lesion.   Neurological:      Mental Status: She is alert and oriented to person, place, and time.   Psychiatric:         Mood and Affect: Mood normal.         Behavior: Behavior normal.         Fluids  No intake or output data in the 24 hours ending 08/24/23 1543        Laboratory  Recent Labs     08/22/23  0341 08/24/23  0740   WBC 8.7 7.3   RBC 3.80* 3.83*   HEMOGLOBIN 11.9* 12.0   HEMATOCRIT 36.1* 36.6*   MCV 95.0 95.6   MCH 31.3 31.3   MCHC 33.0 32.8   RDW 48.8 47.6   PLATELETCT 217 252   MPV 9.5 9.5     Recent Labs     08/22/23  0341 08/23/23  0407 08/24/23  0740   SODIUM 129* 131* 132*   POTASSIUM 4.3 4.6 4.2   CHLORIDE 94* 96 96   CO2 27 27 28   GLUCOSE 105* 132* 131*   BUN 60* 43* 30*   CREATININE 1.67* 1.05 0.98   CALCIUM 8.5 8.5 8.9                   Imaging  HC-UXBFOSS-3 VIEW   Final Result      No acute process. Large amount of stool.      EC-ECHOCARDIOGRAM COMPLETE W/O CONT   Final Result      DX-CHEST-PORTABLE (1 VIEW)   Final Result      No acute cardiopulmonary abnormality.      CT-CSPINE WITHOUT PLUS RECONS   Final Result      No acute fracture or dislocation of the cervical spine.      Multilevel disc and facet degeneration.      CT-HEAD W/O   Final Result      1.  Chronic microvascular ischemic type changes.   2.  No acute intracranial abnormality.   3.  Right sphenoid sinusitis                Assessment/Plan  * Syncope and collapse- (present on  "admission)  Assessment & Plan  Episode of syncope and collapse while at the bank today.  2-month history of near syncope.  All episodes associated with nausea, likely vasovagal  No arrhythmias noted on telemetry  Echo revealed LVEF 65% aortic valve sclerosis without stenosis  PT OT    Major depressive disorder  Assessment & Plan  History of depression  Major depression disorder severe per psychiatry    I discussed with psychiatry, started on Remeron,   Follow-up psych recommendations    Acute cystitis without hematuria  Assessment & Plan  UA positive for UTI, symptomatic  I ordered a urine culture  I discussed with pharmacy, allergy to penicillin, started on Cefdinir    ACP (advance care planning)  Assessment & Plan  I have discussed with patient  regarding advance care planning. Patient is aox 4, with capacity to make medical decisions.  We discussed regarding CODE STATUS, CPR and intubation with mechanical ventilation, discussed regarding risk and benefits. patient decided for DNR/DNI. She said, \"just let me go\". patient CODE STATUS has been updated in [facility] with DNR/DNI as per patient's wishes.  I have spent 17 minutes discussing and with patient  8/25 I discussed the CODE STATUS with the granddaughter Airam on the phone.  She confirmed DNR/DNI.  She said the patient decided DNR/DNI prior to admission    Atrial fibrillation (HCC)  Assessment & Plan  Restarting back her metoprolol  Also restarting back her pradaxa     JEREMIAH (obstructive sleep apnea)- (present on admission)  Assessment & Plan  Noncompliant with CPAP, as she she says she is unable to sleep with the mask over her face.  She reports waking with extreme fatigue and is tired throughout the day.  Prior to the CPAP, she was using oxygen at night.  Request not to use CPAP here as well  - Supplemental oxygen 2 L at at bedtime  - Encourage follow-up for CPAP adjustment    Hyperglycemia- (present on admission)  Assessment & Plan  Blood sugar mildly elevated.  " Reports intermittent peripheral neuropathy, which she was suffering from during my exam  -A1c    Situational stress- (present on admission)  Assessment & Plan  Patient's   recently, and she has not really had a place to live since, and has been living with various daughters and grandchildren.  She has had weight loss of about 30 pounds over the last 6 months, has had months of nausea and now with syncope.  Stress may be contributing to her current symptoms.  -Patient would benefit from case management intervention  -Ativan p.o. as needed     Hypothyroidism- (present on admission)  Assessment & Plan  - Continue Synthroid  -TSH levl    GERD (gastroesophageal reflux disease)- (present on admission)  Assessment & Plan  - Continue Protonix    Discussed with SLP,  recommended soft diet and outpatient GI follow-up for history of GERD/dysphagia    HTN (hypertension)- (present on admission)  Assessment & Plan  BP still not well controlled   Increased amlodipine and starting back metoprolol   Close monitoring and adjust as needed           VTE prophylaxis: Pradaxa    I have performed a physical exam and reviewed and updated ROS and Plan today (2023). In review of yesterday's note (2023), there are no changes except as documented above.

## 2023-08-24 NOTE — DISCHARGE PLANNING
Edd Hendricks  here to eval pt. He will likely accept pt. Will need to do GH waiver and VARGAS if/when accepted. Cost will be $3000..00, pt's income is $1000.00.

## 2023-08-24 NOTE — CARE PLAN
The patient is Stable - Low risk of patient condition declining or worsening    Shift Goals  Clinical Goals: safety, Q 2 turn  Patient Goals: comfort  Family Goals: no family present    Progress made toward(s) clinical / shift goals:    Problem: Knowledge Deficit - Standard  Goal: Patient and family/care givers will demonstrate understanding of plan of care, disease process/condition, diagnostic tests and medications  Outcome: Progressing   POC discussed with the patient and granddaughter. Questions answered. Verbalized understanding.    Problem: Fall Risk  Goal: Patient will remain free from falls  Outcome: Progressing   Fall protocol in effect. Call light within reach. Reminded patient to call for assist. Kept room clutter free. Hourly rounds in effect. Verbalized understanding.    Problem: Depression  Goal: Patient and family/caregiver will verbalize accurate information about at least two of the possible causes of depression, three-four of the signs and symptoms of depression  Outcome: Progressing   On Remeron (see MAR).    Problem: Pain - Standard  Goal: Alleviation of pain or a reduction in pain to the patient’s comfort goal  Outcome: Progressing   Educated on pain scale. Encouraged to verbalize pain. Will medicate as per MAR.    Problem: Skin Integrity  Goal: Skin integrity is maintained or improved  Outcome: Progressing   Q 2 hour turn in effect. Kept skin clean and dry. Frequent in continence checks. Barrier cream applied for every after incontinence.       Patient is not progressing towards the following goals:

## 2023-08-25 LAB
GAMMA INTERFERON BACKGROUND BLD IA-ACNC: 0.21 IU/ML
M TB IFN-G BLD-IMP: NEGATIVE
M TB IFN-G CD4+ BCKGRND COR BLD-ACNC: -0.13 IU/ML
MITOGEN IGNF BCKGRD COR BLD-ACNC: >10 IU/ML
QFT TB2 - NIL TBQ2: -0.09 IU/ML

## 2023-08-25 PROCEDURE — A9270 NON-COVERED ITEM OR SERVICE: HCPCS | Performed by: NURSE PRACTITIONER

## 2023-08-25 PROCEDURE — 99232 SBSQ HOSP IP/OBS MODERATE 35: CPT | Mod: GC | Performed by: PSYCHIATRY & NEUROLOGY

## 2023-08-25 PROCEDURE — 700102 HCHG RX REV CODE 250 W/ 637 OVERRIDE(OP): Performed by: STUDENT IN AN ORGANIZED HEALTH CARE EDUCATION/TRAINING PROGRAM

## 2023-08-25 PROCEDURE — A9270 NON-COVERED ITEM OR SERVICE: HCPCS | Performed by: INTERNAL MEDICINE

## 2023-08-25 PROCEDURE — A9270 NON-COVERED ITEM OR SERVICE: HCPCS | Performed by: STUDENT IN AN ORGANIZED HEALTH CARE EDUCATION/TRAINING PROGRAM

## 2023-08-25 PROCEDURE — 700102 HCHG RX REV CODE 250 W/ 637 OVERRIDE(OP): Performed by: INTERNAL MEDICINE

## 2023-08-25 PROCEDURE — 700105 HCHG RX REV CODE 258: Performed by: STUDENT IN AN ORGANIZED HEALTH CARE EDUCATION/TRAINING PROGRAM

## 2023-08-25 PROCEDURE — 99233 SBSQ HOSP IP/OBS HIGH 50: CPT | Performed by: STUDENT IN AN ORGANIZED HEALTH CARE EDUCATION/TRAINING PROGRAM

## 2023-08-25 PROCEDURE — 92526 ORAL FUNCTION THERAPY: CPT

## 2023-08-25 PROCEDURE — G0378 HOSPITAL OBSERVATION PER HR: HCPCS

## 2023-08-25 PROCEDURE — 700102 HCHG RX REV CODE 250 W/ 637 OVERRIDE(OP): Performed by: NURSE PRACTITIONER

## 2023-08-25 PROCEDURE — 700111 HCHG RX REV CODE 636 W/ 250 OVERRIDE (IP): Performed by: NURSE PRACTITIONER

## 2023-08-25 RX ADMIN — LACTULOSE 30 ML: 20 SOLUTION ORAL at 06:44

## 2023-08-25 RX ADMIN — METOPROLOL TARTRATE 25 MG: 25 TABLET, FILM COATED ORAL at 05:10

## 2023-08-25 RX ADMIN — OMEPRAZOLE 20 MG: 20 CAPSULE, DELAYED RELEASE ORAL at 05:10

## 2023-08-25 RX ADMIN — Medication 1000 UNITS: at 05:10

## 2023-08-25 RX ADMIN — LEVOTHYROXINE SODIUM 100 MCG: 0.1 TABLET ORAL at 05:09

## 2023-08-25 RX ADMIN — CEFDINIR 300 MG: 300 CAPSULE ORAL at 11:28

## 2023-08-25 RX ADMIN — SENNOSIDES AND DOCUSATE SODIUM 2 TABLET: 50; 8.6 TABLET ORAL at 05:10

## 2023-08-25 RX ADMIN — POLYETHYLENE GLYCOL 3350 1 PACKET: 17 POWDER, FOR SOLUTION ORAL at 05:08

## 2023-08-25 RX ADMIN — AMLODIPINE BESYLATE 10 MG: 10 TABLET ORAL at 05:09

## 2023-08-25 RX ADMIN — OMEPRAZOLE 20 MG: 20 CAPSULE, DELAYED RELEASE ORAL at 18:14

## 2023-08-25 RX ADMIN — METOPROLOL TARTRATE 25 MG: 25 TABLET, FILM COATED ORAL at 18:13

## 2023-08-25 RX ADMIN — LACTULOSE 30 ML: 20 SOLUTION ORAL at 11:28

## 2023-08-25 RX ADMIN — LACTULOSE 30 ML: 20 SOLUTION ORAL at 18:14

## 2023-08-25 RX ADMIN — DABIGATRAN ETEXILATE MESYLATE 75 MG: 75 CAPSULE ORAL at 05:11

## 2023-08-25 RX ADMIN — ONDANSETRON 4 MG: 4 TABLET, ORALLY DISINTEGRATING ORAL at 05:21

## 2023-08-25 RX ADMIN — DABIGATRAN ETEXILATE MESYLATE 75 MG: 75 CAPSULE ORAL at 18:13

## 2023-08-25 RX ADMIN — SODIUM CHLORIDE: 9 INJECTION, SOLUTION INTRAVENOUS at 18:16

## 2023-08-25 ASSESSMENT — ENCOUNTER SYMPTOMS
EYES NEGATIVE: 1
PALPITATIONS: 0
WEIGHT LOSS: 1
ROS GI COMMENTS: CHRONIC CONSTIPATION
MYALGIAS: 1
WEAKNESS: 1
SHORTNESS OF BREATH: 0

## 2023-08-25 ASSESSMENT — PAIN DESCRIPTION - PAIN TYPE: TYPE: ACUTE PAIN;SURGICAL PAIN

## 2023-08-25 NOTE — DISCHARGE PLANNING
Case Management Discharge Planning    Admission Date: 8/15/2023  GMLOS:    ALOS: 0    6-Clicks ADL Score: 17  6-Clicks Mobility Score: 6  PT and/or OT Eval ordered: Yes  Post-acute Referrals Ordered: Yes  Post-acute Choice Obtained: Yes  Has referral(s) been sent to post-acute provider:  Yes      Anticipated Discharge Dispo: Discharge Disposition: Discharged to home/self care (01)    DME Needed: No    Action(s) Taken: OTHER    LSW completed chart review. Patient agreeable with discharging to a group home.     LSW spoke to Edd Hendricks regarding dc plan. Edd reportedly hesistant to accept patient as he is concerned that patient will improve significantly and only be a level one for group home waiver which is  $2300.     [facility] owner Denver called regarding dc plan. LSW requested Denver meet with patient and assess her. Per Denver, he will charge $4,000 monthly for patient.     Escalations Completed: None    Barriers to Discharge: Pending Placement    Is the patient up for discharge tomorrow: No

## 2023-08-25 NOTE — CONSULTS
"Brief Behavioral Health Care      Request for psychotherapy received. Attempted to meet with patient. Patient found to be very irritable and unwilling to engage in the psychotherapy process. Patient states, \"I don't want to talk\". Continued to encourage patient to engage in the interview process, patient states, \"I'm crazy and I want to sleep\".    At this time patient reports not being interested in the psychotherapy process.    Signing off    Please feel free to reorder if patient agrees to participate.    Thank you,    Chrystal Arroyo, Ph.D., [facility]  "

## 2023-08-25 NOTE — PROGRESS NOTES
4 Eyes Skin Assessment Completed by MANI Burr and MANI White.    Head WDL  Ears WDL  Nose WDL  Mouth WDL  Neck WDL  Breast/Chest WDL  Shoulder Blades WDL  Spine WDL  (R) Arm/Elbow/Hand Bruising  (L) Arm/Elbow/Hand WDL  Abdomen Scar and Bruising  Groin WDL  Scrotum/Coccyx/Buttocks Redness and Blanching  (R) Leg WDL  (L) Leg WDL  (R) Heel/Foot/Toe Redness, Blanching, and Boggy  (L) Heel/Foot/Toe Redness, Blanching, and Boggy          Devices In Places Pulse Ox and SCD's      Interventions In Place Heel Mepilex, Waffle Overlay, Q2 Turns, Barrier Cream, Dri-Mark Pads, and Pressure Redistribution Mattress    Possible Skin Injury No    Pictures Uploaded Into Epic N/A  Wound Consult Placed N/A  RN Wound Prevention Protocol Ordered No

## 2023-08-25 NOTE — CARE PLAN
The patient is Stable - Low risk of patient condition declining or worsening    Shift Goals  Clinical Goals: safety, skin integrity  Patient Goals: comfort  Family Goals: Not present    Progress made toward(s) clinical / shift goals:    Problem: Knowledge Deficit - Standard  Goal: Patient and family/care givers will demonstrate understanding of plan of care, disease process/condition, diagnostic tests and medications  Outcome: Progressing     Problem: Fall Risk  Goal: Patient will remain free from falls  Outcome: Progressing     Problem: Depression  Goal: Patient and family/caregiver will verbalize accurate information about at least two of the possible causes of depression, three-four of the signs and symptoms of depression  Outcome: Progressing     Problem: Pain - Standard  Goal: Alleviation of pain or a reduction in pain to the patient’s comfort goal  Outcome: Progressing       Patient is not progressing towards the following goals:

## 2023-08-25 NOTE — PROGRESS NOTES
"2036, patient awake, offered to feed patient dinner but she refused to eat it and said \"it looks disgusting\". Patient did want Ice cream, hand fed patient half the ice cream, she said \"it has no flavor\". Patient stated \" I wish my grand daughter would bring me food I like\".   " Patient expressed no known problems or needs

## 2023-08-25 NOTE — PROGRESS NOTES
4 Eyes Skin Assessment Completed by Kira RN and MANI Zuñiga, and MANI Lauren.     Head WDL  Ears WDL  Nose WDL  Mouth WDL  Neck WDL  Breast/Chest WDL  Shoulder Blades WDL  Spine WDL  (R) Arm/Elbow/Hand WDL  (L) Arm/Elbow/Hand Bruising  Abdomen Bruising  Groin WDL  Scrotum/Coccyx/Buttocks WDL  (R) Leg WDL  (L) Leg Bruising  (R) Heel/Foot/Toe WDL  (L) Heel/Foot/Toe WDL          Devices In Places Blood Pressure Cuff, Pulse Ox, and SCD's      Interventions In Place Heel Mepilex, Waffle Overlay, TAP System, Pillows, Dri-Mark Pads, and Pressure Redistribution Mattress    Possible Skin Injury No    Pictures Uploaded Into Epic N/A  Wound Consult Placed N/A  RN Wound Prevention Protocol Ordered Yes

## 2023-08-25 NOTE — CARE PLAN
The patient is Stable - Low risk of patient condition declining or worsening    Shift Goals  Clinical Goals: increase oral intake, safety  Patient Goals: Sleep, comfort  Family Goals: Not present    Progress made toward(s) clinical / shift goals:    Problem: Fall Risk  Goal: Patient will remain free from falls  Outcome: Progressing    Fall precautions in place. Pt calls for assistance.     Problem: Pain - Standard  Goal: Alleviation of pain or a reduction in pain to the patient’s comfort goal  Outcome: Progressing     Pt states pain is tolerable with rest.     Problem: Skin Integrity  Goal: Skin integrity is maintained or improved  Outcome: Progressing     Skin check revealed intact skin. Heel pads in place.

## 2023-08-25 NOTE — THERAPY
"Speech Language Pathology   Daily Treatment     Patient Name: Felipa Chavez  AGE:  82 y.o., SEX:  female  Medical Record #: [id number]  Date of Service: 8/25/2023    Precautions: Fall Risk, Reflux Precautions      HPI: 81 y/o female presented 8/15 after syncopal episode. Over the past 2mo, has had syncope related to nausea and dizziness. Reported EGD history.     Subjective  RN cleared pt for dysphagia Tx. Pt was received awake and sitting up in bed. Per RN, pt tolerating medications well; However, overall limited PO intake. SLP inquired about limited PO intake with pt, pt endorsing that \"food tastes bitter.\"     Assessment  Pt managed sequential sips of TN0 (via straw) completed. No overt s/sx of laryngeal insufficiency or esophageal dysphagia appreciated. Pt denied all further PO d/t fatigue. SLP reviewed education on the anatomy/physiology of deglutition and s/sx of aspiration. Pt verbalized understanding. Reflux precautions reviewed. Utilizing the teach back method, pt recalled all reflux precautions with 100% accuracy.     Clinical Impressions  Pt presents with a mild oral dysphagia evidenced via delayed mastication on regular items; consistent with poor/missing dentition, generalized weakness, and sarcopenia. Deficits best managed diet modifications at this time. No s/sx of laryngeal insufficiency or esophageal dysphagia appreciated on all PO; therefore no further acute SLP needs. However, recommend continuation of reflux precautions. Due to limited PO, pt is at increased risk for not meeting their nutritional and hydration needs. RN and MD aware. Should change in status occur, please downgrade as appropriate and consult SLP. Thank you.     Recommendations  Treatment Completed: Dysphagia Treatment  Diet Consistency: soft and bite size solids (SB6) and thin liquids (TN0)  Instrumentation: None indicated at this time  Medication: Whole with liquid, As tolerated  Supervision: Distant supervision - check on patient 2-3 " "times per meal  Positioning: Fully upright and midline during oral intake, Remain upright for 45 minutes after oral intake, Meals sitting upright in a chair, as tolerated  Risk Management : Small bites/sips, Alternate bites and sips, Slow rate of intake, Physical mobility, as tolerated  Oral Care: BID    SLP Treatment Plan  Treatment Plan: None Indicated  SLP Frequency: 2x Per Week  Estimated Duration: Until Therapy Goals Met    Anticipated Discharge Needs  Discharge Recommendations: Anticipate that the patient will have no further speech therapy needs after discharge from the hospital  Therapy Recommendations Upon DC: Not Indicated    Patient / Family Goals  Patient / Family Goal #1: \"apple juice\"  Goal #1 Outcome: Goal met  Short Term Goals  Short Term Goal # 1: Pt will consume soft and bite size solid and thin liquid diet without any overt s/sx of aspiration or change in respiratory status.  Goal Outcome # 1: Goal met  Short Term Goal # 2: With min cues, pt will implement reflux precautions during PO intake with >85% accuracy.  Goal Outcome # 2 : Goal met    Camille Chaudhry, SLP  "

## 2023-08-25 NOTE — CONSULTS
"PSYCHIATRIC FOLLOW-UP:(established)  *Reason for admission:  Syncope      *Legal Hold Status:  no hold          Chart reviewed.         HPI: Patient reports decreased appetite, not wanting to eat, fatigue, generalized weakness. She denies any changes in her appetite or sleep since starting her Remeron. Patient states that the food is \"disgusting\" or \"has no taste\". She states she has spoken with her grand-daughter, but was too tired to report their conversation. When asked about going to the group home, she says she does not want to go. She only wants to go back to her home in [facility]. She denies any thoughts of wanting to harm herself or thoughts of wanting to die, she just wants to leave the hospital.     Overall, during the interview patient was very tired and in/out of sleep. Patient had difficulty keeping her eyes open and participating in the conversation.     Called and spoke with granddaughter, Airam about the treatment plan. Airam expressed understanding and denied questions regarding Remeron medication. Airam reported she will be in the hospital at 12:30p today and will ask additional questions, if any arise, with hospitalist.            Medical ROS (as pertinent):     Review of Systems   Constitutional:  Positive for malaise/fatigue and weight loss.   HENT: Negative.     Eyes: Negative.    Respiratory:  Negative for shortness of breath.    Cardiovascular:  Negative for palpitations.        Intermittent leg swelling   Gastrointestinal:         Chronic constipation   Musculoskeletal:  Positive for myalgias.   Neurological:  Positive for weakness.           Psychiatric Examination:  Vitals:   Vitals:    08/25/23 0807   BP: (!) 141/64   Pulse: 82   Resp: 16   Temp: 36.8 °C (98.2 °F)   SpO2: 92%      Appearance: appears stated age, fair grooming and hygiene, sleeping, somewhat cooperative, poor eye contact  Abnormal movements: none  Gait/posture: gait not assessed  Speech: quiet volume, tone and " "slow rhythm, single word answers  Though process: linear and organized  Associations: no loose associations  Thought content: not observed responding to internal stimuli, denies paranoia, delusional content not present during interview  Judgement and Insight: limited/limited  Orientation:oriented to person, place, time and situation  Recent and Remote Memory: grossly intact  Attention Span and Concentration: grossly intact  Language: English, fluent  Fund of Knowledge: not tested   Mood and Affect:\"tired\", somnolent, congruent  SI/HI:denies any active or passive SI/HI           *EK/15/23,    *Imagin/15/23 Head CT, results below  IMPRESSION:     1.  Chronic microvascular ischemic type changes.  2.  No acute intracranial abnormality.  3.  Right sphenoid sinusitis   EEG:  none     Labs personally reviewed     Assessment: Per chart review and patient interview, patient's appetite has not improved since starting Remeron. Patient was very tired during interview, difficult to determine if this is a medication side effect, patient's baseline, or a result of the patient's infection.  Remeron will be continued and patient's appetite and oral intake will continue to be monitored. Patient's depressive symptoms may be related to UTI or medication.  Once infection has resolved, if patient's depressive symptoms do not improve we will consider changing metoprolol to another medication as it can also cause depression.  Patient's granddaughter was called today and expressed understanding of the plan.      Dx:  #Grief, prolonged  #Major depressive disorder, severe - current  R/o depression due to medical condition    Medical :  - see medical note      Plan:  - Legal hold: no legal hold, patient does not meet criteria at this time  - Psychotropic medications   - Continue Remeron 7.5mg PO qhs for depression and poor appetite   - Inpatient psychotherapy starting today  - Labs reviewed  - EKG reviewed  - Old records " reviewed  - Spoke with grandciarraughterAiram on 8/25/23  - Safety plan completed, copy in chart  - Discussed the case with: Dr. Block and spoke Dr. Fuentes  - Psychiatry will follow up    Thank you for the consult.       This note was created using voice recognition software (Dragon). The accuracy of the dictation is limited by the abilities of the software. I have reviewed the note prior to signing. However, error related to voice recognition software and /or scribes may still exist and should be interpreted within the appropriate context.

## 2023-08-25 NOTE — DIETARY
Nutrition Services:  Day 0 of admit.  Felipa Chavez is a 82 y.o. female with admitting DX of Syncope and collapse [R55].  RD has been monitoring for adequacy of PO intake. Additionally consult received for FTT.     Patient remains on a level 6 soft and bite sized cardiac diet with thin liquids. PO intake 25-50% of most recent meals. She did not eat breakfast this morning of note.     I visited with patient this afternoon at bedside. She was irritated when I arrived and states that she is tired but people keep coming into talk to her. I briefly discussed the reason for my visit.  She reports she is too tired and doesn't feel like eating.  Discussed boost supplements - she notes that she does enjoy these in the chocolate flavor.  Will continue to send them TID.  Lunch at bedside untouched.  When I offered to help her sit up and eat, she refused and closed her eyes.  Allowed patient to rest at this time but did encourage PO intake.  Discussed with RN at bedside.    Reviewed chart for ACP - no indication of advanced directive on file so unsure of patients wishes for nutrition support. SLP continues to work with patient.     Remeron on board for appetite stimulation. This was started 8/23.      Problem: Nutritional:  Goal: Achieve adequate nutritional intake  Description: Patient will consume >50% of meals  Outcome: No met

## 2023-08-26 PROBLEM — I48.0 PAROXYSMAL ATRIAL FIBRILLATION (HCC): Status: ACTIVE | Noted: 2023-08-17

## 2023-08-26 PROBLEM — F32.2 CURRENT SEVERE EPISODE OF MAJOR DEPRESSIVE DISORDER WITHOUT PSYCHOTIC FEATURES WITHOUT PRIOR EPISODE (HCC): Status: ACTIVE | Noted: 2023-08-24

## 2023-08-26 PROBLEM — E43 SEVERE PROTEIN-CALORIE MALNUTRITION (HCC): Status: ACTIVE | Noted: 2023-08-26

## 2023-08-26 PROBLEM — K59.01 SLOW TRANSIT CONSTIPATION: Status: ACTIVE | Noted: 2023-08-26

## 2023-08-26 PROBLEM — R73.03 PREDIABETES: Status: ACTIVE | Noted: 2023-08-15

## 2023-08-26 PROBLEM — N17.9 AKI (ACUTE KIDNEY INJURY) (HCC): Status: ACTIVE | Noted: 2023-08-26

## 2023-08-26 PROBLEM — R55 VASOVAGAL NEAR-SYNCOPE: Status: ACTIVE | Noted: 2023-08-15

## 2023-08-26 PROBLEM — R62.7 FAILURE TO THRIVE IN ADULT: Status: ACTIVE | Noted: 2023-08-26

## 2023-08-26 LAB
BACTERIA UR CULT: ABNORMAL
BACTERIA UR CULT: ABNORMAL
SIGNIFICANT IND 70042: ABNORMAL
SITE SITE: ABNORMAL
SOURCE SOURCE: ABNORMAL

## 2023-08-26 PROCEDURE — 700105 HCHG RX REV CODE 258: Performed by: STUDENT IN AN ORGANIZED HEALTH CARE EDUCATION/TRAINING PROGRAM

## 2023-08-26 PROCEDURE — A9270 NON-COVERED ITEM OR SERVICE: HCPCS | Performed by: INTERNAL MEDICINE

## 2023-08-26 PROCEDURE — 700102 HCHG RX REV CODE 250 W/ 637 OVERRIDE(OP): Performed by: STUDENT IN AN ORGANIZED HEALTH CARE EDUCATION/TRAINING PROGRAM

## 2023-08-26 PROCEDURE — 96375 TX/PRO/DX INJ NEW DRUG ADDON: CPT

## 2023-08-26 PROCEDURE — A9270 NON-COVERED ITEM OR SERVICE: HCPCS | Performed by: NURSE PRACTITIONER

## 2023-08-26 PROCEDURE — 99233 SBSQ HOSP IP/OBS HIGH 50: CPT | Performed by: STUDENT IN AN ORGANIZED HEALTH CARE EDUCATION/TRAINING PROGRAM

## 2023-08-26 PROCEDURE — A9270 NON-COVERED ITEM OR SERVICE: HCPCS | Performed by: STUDENT IN AN ORGANIZED HEALTH CARE EDUCATION/TRAINING PROGRAM

## 2023-08-26 PROCEDURE — 700102 HCHG RX REV CODE 250 W/ 637 OVERRIDE(OP): Performed by: NURSE PRACTITIONER

## 2023-08-26 PROCEDURE — 700111 HCHG RX REV CODE 636 W/ 250 OVERRIDE (IP): Performed by: NURSE PRACTITIONER

## 2023-08-26 PROCEDURE — G0378 HOSPITAL OBSERVATION PER HR: HCPCS

## 2023-08-26 PROCEDURE — 700102 HCHG RX REV CODE 250 W/ 637 OVERRIDE(OP): Performed by: INTERNAL MEDICINE

## 2023-08-26 RX ORDER — POLYETHYLENE GLYCOL 3350 17 G/17G
1 POWDER, FOR SOLUTION ORAL 3 TIMES DAILY PRN
Status: DISCONTINUED | OUTPATIENT
Start: 2023-08-26 | End: 2023-09-02 | Stop reason: HOSPADM

## 2023-08-26 RX ORDER — SULFAMETHOXAZOLE AND TRIMETHOPRIM 800; 160 MG/1; MG/1
1 TABLET ORAL EVERY 12 HOURS
Status: DISCONTINUED | OUTPATIENT
Start: 2023-08-26 | End: 2023-08-26

## 2023-08-26 RX ORDER — POLYETHYLENE GLYCOL 3350 17 G/17G
1 POWDER, FOR SOLUTION ORAL 2 TIMES DAILY
Status: DISCONTINUED | OUTPATIENT
Start: 2023-08-26 | End: 2023-08-26

## 2023-08-26 RX ORDER — ACETAMINOPHEN 500 MG
1000 TABLET ORAL EVERY 6 HOURS PRN
Status: DISCONTINUED | OUTPATIENT
Start: 2023-08-26 | End: 2023-09-02 | Stop reason: HOSPADM

## 2023-08-26 RX ORDER — PROCHLORPERAZINE MALEATE 10 MG
5 TABLET ORAL EVERY 6 HOURS PRN
Status: DISCONTINUED | OUTPATIENT
Start: 2023-08-26 | End: 2023-09-02 | Stop reason: HOSPADM

## 2023-08-26 RX ORDER — POLYETHYLENE GLYCOL 3350 17 G/17G
1 POWDER, FOR SOLUTION ORAL
Status: DISCONTINUED | OUTPATIENT
Start: 2023-08-26 | End: 2023-09-02 | Stop reason: HOSPADM

## 2023-08-26 RX ORDER — SODIUM CHLORIDE, SODIUM LACTATE, POTASSIUM CHLORIDE, CALCIUM CHLORIDE 600; 310; 30; 20 MG/100ML; MG/100ML; MG/100ML; MG/100ML
1000 INJECTION, SOLUTION INTRAVENOUS ONCE
Status: COMPLETED | OUTPATIENT
Start: 2023-08-26 | End: 2023-08-28

## 2023-08-26 RX ORDER — PROCHLORPERAZINE EDISYLATE 5 MG/ML
10 INJECTION INTRAMUSCULAR; INTRAVENOUS EVERY 6 HOURS PRN
Status: DISCONTINUED | OUTPATIENT
Start: 2023-08-26 | End: 2023-09-02 | Stop reason: HOSPADM

## 2023-08-26 RX ORDER — NITROFURANTOIN 25; 75 MG/1; MG/1
100 CAPSULE ORAL 2 TIMES DAILY WITH MEALS
Status: DISPENSED | OUTPATIENT
Start: 2023-08-26 | End: 2023-08-31

## 2023-08-26 RX ORDER — CITALOPRAM 20 MG/1
10 TABLET ORAL DAILY
Status: DISCONTINUED | OUTPATIENT
Start: 2023-08-26 | End: 2023-08-28

## 2023-08-26 RX ADMIN — BISACODYL 10 MG: 10 SUPPOSITORY RECTAL at 20:31

## 2023-08-26 RX ADMIN — AMLODIPINE BESYLATE 10 MG: 10 TABLET ORAL at 04:13

## 2023-08-26 RX ADMIN — ACETAMINOPHEN 1000 MG: 500 TABLET, FILM COATED ORAL at 15:21

## 2023-08-26 RX ADMIN — SENNOSIDES AND DOCUSATE SODIUM 2 TABLET: 50; 8.6 TABLET ORAL at 17:17

## 2023-08-26 RX ADMIN — LACTULOSE 30 ML: 20 SOLUTION ORAL at 11:57

## 2023-08-26 RX ADMIN — LEVOTHYROXINE SODIUM 100 MCG: 0.1 TABLET ORAL at 04:13

## 2023-08-26 RX ADMIN — OMEPRAZOLE 20 MG: 20 CAPSULE, DELAYED RELEASE ORAL at 17:17

## 2023-08-26 RX ADMIN — SENNOSIDES AND DOCUSATE SODIUM 2 TABLET: 50; 8.6 TABLET ORAL at 04:13

## 2023-08-26 RX ADMIN — METOPROLOL TARTRATE 25 MG: 25 TABLET, FILM COATED ORAL at 04:13

## 2023-08-26 RX ADMIN — CEFDINIR 300 MG: 300 CAPSULE ORAL at 11:58

## 2023-08-26 RX ADMIN — METOPROLOL TARTRATE 25 MG: 25 TABLET, FILM COATED ORAL at 17:16

## 2023-08-26 RX ADMIN — POLYETHYLENE GLYCOL 3350 1 PACKET: 17 POWDER, FOR SOLUTION ORAL at 17:16

## 2023-08-26 RX ADMIN — DABIGATRAN ETEXILATE MESYLATE 75 MG: 75 CAPSULE ORAL at 17:17

## 2023-08-26 RX ADMIN — HYDRALAZINE HYDROCHLORIDE 10 MG: 20 INJECTION, SOLUTION INTRAMUSCULAR; INTRAVENOUS at 04:38

## 2023-08-26 RX ADMIN — MIRTAZAPINE 7.5 MG: 15 TABLET, FILM COATED ORAL at 20:56

## 2023-08-26 RX ADMIN — Medication 1000 UNITS: at 04:13

## 2023-08-26 RX ADMIN — ACETAMINOPHEN 650 MG: 325 TABLET, FILM COATED ORAL at 04:13

## 2023-08-26 RX ADMIN — DABIGATRAN ETEXILATE MESYLATE 75 MG: 75 CAPSULE ORAL at 04:18

## 2023-08-26 RX ADMIN — NITROFURANTOIN MONOHYDRATE/MACROCRYSTALLINE 100 MG: 25; 75 CAPSULE ORAL at 17:17

## 2023-08-26 RX ADMIN — OMEPRAZOLE 20 MG: 20 CAPSULE, DELAYED RELEASE ORAL at 04:13

## 2023-08-26 RX ADMIN — CITALOPRAM HYDROBROMIDE 10 MG: 20 TABLET ORAL at 15:22

## 2023-08-26 ASSESSMENT — ENCOUNTER SYMPTOMS
NERVOUS/ANXIOUS: 0
CONSTIPATION: 1
NAUSEA: 1
DEPRESSION: 1

## 2023-08-26 ASSESSMENT — PAIN DESCRIPTION - PAIN TYPE
TYPE: ACUTE PAIN
TYPE: ACUTE PAIN

## 2023-08-26 NOTE — ASSESSMENT & PLAN NOTE
~25% weight loss (30 lbs) preceding hospitalization  Bitemporal wasting  RD consult, agreeable to Boost supplements  Unrestricted diet, encouraged to request and have family deliver desirable food

## 2023-08-26 NOTE — ASSESSMENT & PLAN NOTE
Likely polyfactorial including chronic idiopathic constipation, poor PO intake, and decreased mobility with possible neurogenic bowel  Reportedly evaluated by colonoscopy previously  Exacerbated by overflow fecal incontinence  Scheduled miralax with PRN doses for catharsis  PRN bisacodyl for refractory constipation

## 2023-08-26 NOTE — PROGRESS NOTES
4 Eyes Skin Assessment Completed by MANI Burr and MANI White.    Head WDL  Ears WDL  Nose WDL  Mouth WDL  Neck WDL  Breast/Chest WDL  Shoulder Blades WDL  Spine WDL  (R) Arm/Elbow/Hand WDL  (L) Arm/Elbow/Hand WDL  Abdomen WDL  Groin WDL  Scrotum/Coccyx/Buttocks WDL  (R) Leg WDL  (L) Leg WDL  (R) Heel/Foot/Toe: Boggy, mepilex in place   (L) Heel/Foot/Toe: Boggy mepilex in place          Devices In Places Blood Pressure Cuff, Pulse Ox, and SCD's      Interventions In Place Heel Mepilex, Waffle Overlay, TAP System, Pillows, and Q2 Turns    Possible Skin Injury No    Pictures Uploaded Into Epic N/A  Wound Consult Placed N/A  RN Wound Prevention Protocol Ordered Yes

## 2023-08-26 NOTE — CARE PLAN
The patient is Stable - Low risk of patient condition declining or worsening    Shift Goals  Clinical Goals: Comfort, skin integrity  Patient Goals: rest  Family Goals: Eating, mobilizing    Progress made toward(s) clinical / shift goals:    Problem: Fall Risk  Goal: Patient will remain free from falls  Outcome: Progressing  Note: Patient is a high fall risk, bed alarm is on. Patient calls appropriately.      Problem: Depression  Goal: Patient and family/caregiver will verbalize accurate information about at least two of the possible causes of depression, three-four of the signs and symptoms of depression  Outcome: Progressing  Note: Patient is very lethargic and sleeping the entire shift      Problem: Skin Integrity  Goal: Skin integrity is maintained or improved  Outcome: Progressing  Note: Patient is high risk for skin breakdown, waffle overlay in place, barrier cream applied, skin check performed       Patient is not progressing towards the following goals:

## 2023-08-26 NOTE — CARE PLAN
The patient is Watcher - Medium risk of patient condition declining or worsening    Shift Goals  Clinical Goals: increase PO intake, Comfort, mobilize  Patient Goals: rest  Family Goals: Eaing, mobilizing    Progress made toward(s) clinical / shift goals:    Problem: Skin Integrity  Goal: Skin integrity is maintained or improved  Outcome: Progressing     Pts skin integrity was assessed and intact.

## 2023-08-26 NOTE — ASSESSMENT & PLAN NOTE
Uncertain etiology, likely component of slow transit constipation / gastroparesis  Per patient has been evaluated with EGD and Colonoscopy  KUB demonstrates heavy stool burden  Lactulose transitioned to miralax TID for catharsis from constipation  Zofran PRN with compazine PRN second-line

## 2023-08-26 NOTE — PROGRESS NOTES
"Hospital Medicine Daily Progress Note    Date of Service  8/26/2023    Chief Complaint  Felipa Chavez is a 82 y.o. female with CAD, hypothyroid, HTN, and Afib admitted 8/15/2023 with near-syncope and collapse.    Hospital Course  No notes on file    Interval Problem Update    8/26: NESSA ON.  She feels tired and depressed. She reports that her  \"was everything\" and since his passing she has moved multiple times among various family members which \"didn't work out.\" She hopes to return to [facility] for more support. When asked if she wishes to continue living, she expressed ambivalence. She has difficulty sleeping and does not wear a CPAP, but declined medication for insomnia.    With her permission, I met with her, daughter Sheron, and granddaughter Lu at bedside. They expressed concern regarding her observation status and [employer] insurance, for which I clarified that status is dependent on the medical necessity and expected hospital course at time of admission, which was appropriately observation based on near-syncope with weakness. Lu expressed her concern with communication from providers and staff regarding the diagnostic evaluation, prognosis, and discharge planning. She inquired about improving nutrition, mobility, and discharge planning based on her inability to further care for Mrs. Chavez due to Lu's health conditions, children, and recent care for her father at [facility] on hospice. I reviewed the thorough diagnostic evaluations for reversible causes of weakness and near-syncope which have thus far been sufficient and reassuring. We discussed her bowel incontinence and chronic nausea as likely due to slow transit constipation, for which we will increase bowel regimen for catharsis. I reviewed the assessment and diagnosis of severe depression which is apparent to the staff and formally diagnosed by a Psychiatrist. We discussed her deconditioning, ongoing weight loss, and depression as likely grief " from the loss of her  and home in addition to the constraints of being in the hospital. I advised initiation of an SSRI for severe depression and the limitations of mirtazapine for appetite stimulation. We discussed the ESBL E coli cystitis as a nosocomial infection for which she is expected to have further infection risk if she remains in the hospital. Lu and Sheron are in agreement with coordinating discharge to group home when coordinated, or to family's care if not affordable. We discussed referral to  for ongoing skilled needs and follow up with [facility] PCP after discharge. Advised that RNCM would meet with them to provide GH updates and determine DME needs. Mrs. Chavez was mostly silent and kept her eyes closed during our discussion. At end of discussion, when asked her thoughts she expressed frustration that decisions were made on her behalf. She was reminded that she has capacity to decide disposition when GH arranged, and if unwilling to go her options are with family or to a shelter if she has no home to return to.    Discussed hospital course with Utilization Review Dr. Blake. Moderate hyponatremia / KIYA on 8/22 was possibly artifactual and responded quickly to IVF. Acute cystitis without sepsis does not warrant ongoing IV antibiotics for which ongoing OBSERVATION status is recommended.    I personally spent 126 minutes in patient care including review of chart, clinical encounter, and face-to-face meeting with her and family. More than 50% of time was spent in face-to-face clinical encounter.    I have discussed this patient's plan of care and discharge plan at IDT rounds today with Case Management, Nursing, Nursing leadership, and other members of the IDT team.    Consultants/Specialty  psychiatry and physiatry    Code Status  DNAR/DNI    Disposition  The patient is medically cleared for discharge to home or a post-acute facility.  Anticipate discharge to: home with organized home healthcare and  close outpatient follow-up    I have placed the appropriate orders for post-discharge needs.    Review of Systems  Review of Systems   Gastrointestinal:  Positive for constipation and nausea.   Psychiatric/Behavioral:  Positive for depression. The patient is not nervous/anxious.         Physical Exam  Temp:  [36.7 °C (98.1 °F)-37.2 °C (99 °F)] 36.7 °C (98.1 °F)  Pulse:  [65-84] 69  Resp:  [16] 16  BP: (124-173)/(56-73) 149/59  SpO2:  [95 %-96 %] 96 %    Physical Exam  Vitals and nursing note reviewed.   Constitutional:       General: She is awake. She is not in acute distress.     Appearance: She is underweight. She is not ill-appearing, toxic-appearing or diaphoretic.   HENT:      Head:      Comments: Bitemporal wasting     Nose: Nose normal.      Mouth/Throat:      Mouth: Mucous membranes are dry.   Eyes:      General: No scleral icterus.     Conjunctiva/sclera: Conjunctivae normal.   Pulmonary:      Effort: Pulmonary effort is normal.   Abdominal:      General: Abdomen is flat.   Genitourinary:     Comments: No magaña  Musculoskeletal:      Right lower leg: No edema.      Left lower leg: No edema.   Skin:     General: Skin is warm and dry.   Neurological:      Comments: Appropriately conversant. Moves all extremities.   Psychiatric:         Attention and Perception: Attention normal.         Mood and Affect: Mood is depressed. Affect is flat.         Speech: Speech is delayed.         Behavior: Behavior is slowed and withdrawn. Behavior is cooperative.         Thought Content: Thought content includes suicidal (Passive) ideation. Thought content does not include suicidal plan.         Cognition and Memory: Cognition and memory normal.         Fluids    Intake/Output Summary (Last 24 hours) at 8/26/2023 1458  Last data filed at 8/26/2023 1200  Gross per 24 hour   Intake 218 ml   Output 850 ml   Net -632 ml       Laboratory  Recent Labs     08/24/23  0740   WBC 7.3   RBC 3.83*   HEMOGLOBIN 12.0   HEMATOCRIT 36.6*    MCV 95.6   MCH 31.3   MCHC 32.8   RDW 47.6   PLATELETCT 252   MPV 9.5     Recent Labs     08/24/23  0740   SODIUM 132*   POTASSIUM 4.2   CHLORIDE 96   CO2 28   GLUCOSE 131*   BUN 30*   CREATININE 0.98   CALCIUM 8.9                   Imaging  [id number] VIEW   Final Result      No acute process. Large amount of stool.      EC-ECHOCARDIOGRAM COMPLETE W/O CONT   Final Result      DX-CHEST-PORTABLE (1 VIEW)   Final Result      No acute cardiopulmonary abnormality.      CT-CSPINE WITHOUT PLUS RECONS   Final Result      No acute fracture or dislocation of the cervical spine.      Multilevel disc and facet degeneration.      CT-HEAD W/O   Final Result      1.  Chronic microvascular ischemic type changes.   2.  No acute intracranial abnormality.   3.  Right sphenoid sinusitis              Assessment/Plan  * Vasovagal near-syncope- (present on admission)  Assessment & Plan  Per patient report and security footage at bank she did not lose consciousness  Recurrent near-syncope events precipitated by nausea  Likely vasovagal due to chronic constipation and nausea  CTH negative for acute process  TTE without significant valvular disease  EKG and telemetry NSR without Afib RVR    Slow transit constipation- (present on admission)  Assessment & Plan  Likely polyfactorial including chronic idiopathic constipation, poor PO intake, and decreased mobility with possible neurogenic bowel  Reportedly evaluated by colonoscopy previously  Exacerbated by overflow fecal incontinence  Scheduled miralax with PRN doses for catharsis  PRN bisacodyl for refractory constipation    Failure to thrive in adult- (present on admission)  Assessment & Plan  Weakness and severe protein-calorie malnutrition due to severe MDD  TSH and B12 WNL  PT/OT/SLP evaluations, recommend post-acute placement but not possible on OBS status  Physiatry consulted, not a candidate for IPR  RNCM assisting with GH coordination, TB Quant negative  HH RN/SW/PT/OT/RD/SLP  ordered    Severe protein-calorie malnutrition (HCC)- (present on admission)  Assessment & Plan  ~25% weight loss (30 lbs) preceding hospitalization  Bitemporal wasting  RD consult, agreeable to Boost supplements  Unrestricted diet, encouraged to request and have family deliver desirable food    KIYA (acute kidney injury) (HCC)- (present on admission)  Assessment & Plan  Likely pre-renal from Resolved with IVF  Encouraging PO hydration    Current severe episode of 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 without prior episode (HCC)- (present on admission)  Assessment & Plan  Due to grief of of loss of  and home  Psychiatry consulted and recommended mirtazapine with psychotherapy  Declined psychology consult  Initiated on citalopram in addition to mirtazapine for severe depression    Acute cystitis without hematuria  Assessment & Plan  Symptomatic by dysuria 8/23  UA +Pyuria  UCx grew ESBL E coli  Macrobid for 5-day course per pharmacy  Likely colonized from hospitalization, did not meet sepsis criteria nor worsen with ceftriaxone which was resistant    Paroxysmal atrial fibrillation (HCC)- (present on admission)  Assessment & Plan  Continue metoprolol and pradaxa    Chronic nausea- (present on admission)  Assessment & Plan  Uncertain etiology, likely component of slow transit constipation / gastroparesis  Per patient has been evaluated with EGD and Colonoscopy  KUB demonstrates heavy stool burden  Lactulose transitioned to miralax TID for catharsis from constipation  Zofran PRN with compazine PRN second-line    JEREMIAH (obstructive sleep apnea)- (present on admission)  Assessment & Plan  Unable to tolerate CPAP    Prediabetes- (present on admission)  Assessment & Plan  A1c 5.9  Weight loss not indicated due to severe protein-calorie malnutrition  Repeat A1c in 3 months with PCP    Acquired hypothyroidism- (present on admission)  Assessment & Plan  TSH WNL  Continue levothyroxine    Gastroesophageal  reflux disease with esophagitis without hemorrhage- (present on admission)  Assessment & Plan  Continue omeprazole    Primary hypertension- (present on admission)  Assessment & Plan  Continue metoprolol and amlodipine       VTE prophylaxis:    therapeutic anticoagulation with pradaxa    I have performed a physical exam and reviewed and updated ROS and Plan today (8/26/2023). In review of yesterday's note (8/25/2023), there are no changes except as documented above.

## 2023-08-26 NOTE — PROGRESS NOTES
Hospital Medicine Daily Progress Note    Date of Service  2023    Chief Complaint  Felipa Chavez is a 82 y.o. female admitted 8/15/2023 with fall     Hospital Course  This is a 83 y/o F with PMHX of hypothyroidism, hypertension, CAD, JEREMIAH with intermittent use of CPAP and depression who presented to the ED on 8/15/2023 after having an episode of syncope and collapse.  she was at the bank, she suddenly felt nauseated and dizzy and then passed out.  She recalls lying on the floor feeling confused, unclear what happened. She reports significant stress since her   recently and has lost 30 pounds over the last 6-month since he passed away.  She has since had to live with different daughters and grandkids, but this has not been working out, and she has been significantly depressed over feeling like she has no place to live. She has not seen a provider since last February or March when she lived in [place].  She is currently living locally with her granddaughter, but home life is incredibly stressful for her.  She reports of being compliant with her medications.    CT head  obtained in the ED is negative for any acute findings, but shows right sphenoid sinusitis.  CT of her cervical spine is negative for acute fracture or dislocation, but does show multilevel disc and facet degeneration.  Chest x-ray is negative    : starting back metoprolol for her A.fibb and pradaxa.  Discussed with SLP,  recommended soft diet and outpatient GI follow-up for history of GERD/dysphagia    Interval Problem Update  Patient was seen and examined at bedside  She reported tired.  Denies nausea vomiting  She only ate a very small portion per the nurse.  I talked to her and encouraged her to eat more.  I also discussed with her regarding tube feeding options  Continue Remeron    Continue cefdinir for UTI  I discussed with psychiatry    PT/OT will need placement, declined by rehab and SNFs, not safe to discharge home, may need  "to group home?        8/25 I had another lengthy discussion with the daughter Rachel, granddaughter Airam, and grandson at bedside.  The family apparently not satisfied with the care received here.  I explained to them the main reason patient came here was syncope. Per the note, \"she suddenly felt nauseated and dizzy and then passed out.  She recalls lying on the floor feeling confused, unclear what happened.\"  However, Airam denied that patient passed out and she said the patient was just dizzy. I explained to her that patient was depressed which contributes to her poor appetite and low energy. There is an formal diagnosis of \"major depression disorder, severe\" per psychiatrist. Airam got very mad and upset and stated that her grandma is not depressed at all. I told her that the patient would likely get more depressed in the hospital setting and weaker by lying on the bed all day with the extended hospital stay. Airam stated the hospital ignored her needs.Then Airam refused to talk to me anymore.     Airam is also upset that the hospital didn't know that the patient also had [employer] insurance until she found out, so the patient would be inpatient status instead of observation. She said her [employer] doctor told her to file papers to against it.   Again she said she would do autopsy if her mom dies and she would file papers to against the hospital.     I escalated the above issues to the leadership, Dr Wall. I discussed the case with Dr. Wall on the phone. Dr Wall is aware of this case and plans to have a meeting with the family members tomorrow.      8/24 I had a lengthy discussion with the granddaughter Airam on the phone.  The granddaughter apparently very upset and blamed the hospital ignored her grandma's needs.  She said that she would bring a team with  and may do biopsy if her grandmother dies here. she said her grandma had not been eating well and kept losing weight.  I tried to explain to her " that her grandma has been depressed and we have psychiatrist on the case.  We started a new medication for her appetite and depressed mood. however, the granddaughter does not believe her grandma is depressed and said the patient was in very good spirit before she came here.  I told her the patient herself told me multiple times that she was depressed. I explained to her the mood can be changed especially with significant family events and her hx of depression. I told her she can discuss more with the psychiatrist regarding the depression. I discussed with her nutrition support with possible tube feeding if patient agrees. She said she would discuss with her grandma. She also questioned the diagnosis of hypertension.  I explained to her we are monitoring it and making appropriate adjustment of medications based on the reading. I confirmed DNR/DNI with her.     Total time:  . I spend 65 min to discuss the care plan with the family, the nurse, and our leardership   I spent greater than 50% of the time for patient care, counseling, and coordination on this date, including unit/floor time, and face-to-face time with the patient as per interval events and assessment and plan above  I have discussed this patient's plan of care and discharge plan at IDT rounds today with Case Management, Nursing, Nursing leadership, and other members of the IDT team.    Consultants/Specialty  None     Code Status  DNAR/DNI    Disposition  The patient is not medically cleared for discharge to home or a post-acute facility.      I have placed the appropriate orders for post-discharge needs.    Review of Systems  All 12 systems were reviewed and negative except as mentioned above      Physical Exam  Temp:  [36.7 °C (98.1 °F)-37.2 °C (99 °F)] 36.7 °C (98.1 °F)  Pulse:  [65-84] 69  Resp:  [16] 16  BP: (124-173)/(56-73) 149/59  SpO2:  [95 %-96 %] 96 %    Physical Exam  Vitals and nursing note reviewed.   Constitutional:       General: She is not  in acute distress.     Appearance: She is not ill-appearing.      Comments: Frail   HENT:      Head: Normocephalic and atraumatic.      Nose: Nose normal. No congestion or rhinorrhea.      Mouth/Throat:      Mouth: Mucous membranes are dry.      Pharynx: Oropharynx is clear.   Eyes:      Extraocular Movements: Extraocular movements intact.      Pupils: Pupils are equal, round, and reactive to light.   Cardiovascular:      Rate and Rhythm: Normal rate and regular rhythm.      Pulses: Normal pulses.      Heart sounds: No murmur heard.     No friction rub. No gallop.   Pulmonary:      Effort: Pulmonary effort is normal. No respiratory distress.      Breath sounds: No wheezing, rhonchi or rales.   Abdominal:      General: Abdomen is flat. There is no distension.      Palpations: Abdomen is soft.      Tenderness: There is no abdominal tenderness. There is no guarding or rebound.   Musculoskeletal:         General: Normal range of motion.      Cervical back: Normal range of motion.      Right lower leg: No edema.      Left lower leg: No edema.   Skin:     General: Skin is warm and dry.      Capillary Refill: Capillary refill takes less than 2 seconds.      Coloration: Skin is not jaundiced.      Findings: No bruising or lesion.   Neurological:      Mental Status: She is alert and oriented to person, place, and time.   Psychiatric:         Mood and Affect: Mood normal.         Behavior: Behavior normal.         Fluids    Intake/Output Summary (Last 24 hours) at 8/26/2023 1438  Last data filed at 8/26/2023 1200  Gross per 24 hour   Intake 218 ml   Output 850 ml   Net -632 ml           Laboratory  Recent Labs     08/24/23  0740   WBC 7.3   RBC 3.83*   HEMOGLOBIN 12.0   HEMATOCRIT 36.6*   MCV 95.6   MCH 31.3   MCHC 32.8   RDW 47.6   PLATELETCT 252   MPV 9.5     Recent Labs     08/24/23  0740   SODIUM 132*   POTASSIUM 4.2   CHLORIDE 96   CO2 28   GLUCOSE 131*   BUN 30*   CREATININE 0.98   CALCIUM 8.9                "    Imaging  [id number] VIEW   Final Result      No acute process. Large amount of stool.      EC-ECHOCARDIOGRAM COMPLETE W/O CONT   Final Result      DX-CHEST-PORTABLE (1 VIEW)   Final Result      No acute cardiopulmonary abnormality.      CT-CSPINE WITHOUT PLUS RECONS   Final Result      No acute fracture or dislocation of the cervical spine.      Multilevel disc and facet degeneration.      CT-HEAD W/O   Final Result      1.  Chronic microvascular ischemic type changes.   2.  No acute intracranial abnormality.   3.  Right sphenoid sinusitis                Assessment/Plan  * Syncope and collapse- (present on admission)  Assessment & Plan  Episode of syncope and collapse while at the bank today.  2-month history of near syncope.  All episodes associated with nausea, likely vasovagal  No arrhythmias noted on telemetry  Echo revealed LVEF 65% aortic valve sclerosis without stenosis  PT OT    Major depressive disorder  Assessment & Plan  History of depression  Major depression disorder severe per psychiatry    I discussed with psychiatry, started on Remeron,   Follow-up psych recommendations    KIYA (acute kidney injury) (HCC)  Assessment & Plan  Resolved with IVF    Acute cystitis without hematuria  Assessment & Plan  UA positive for UTI, symptomatic  I ordered a urine culture  I discussed with pharmacy, allergy to penicillin, started on Cefdinir    ACP (advance care planning)  Assessment & Plan  I have discussed with patient  regarding advance care planning. Patient is aox 4, with capacity to make medical decisions.  We discussed regarding CODE STATUS, CPR and intubation with mechanical ventilation, discussed regarding risk and benefits. patient decided for DNR/DNI. She said, \"just let me go\". patient CODE STATUS has been updated in [facility] with DNR/DNI as per patient's wishes.  I have spent 17 minutes discussing and with patient  8/25 I discussed the CODE STATUS with the granddaughter Airam on the phone.  She " confirmed DNR/DNI.  She said the patient decided DNR/DNI prior to admission    Atrial fibrillation (HCC)  Assessment & Plan  Restarting back her metoprolol  Also restarting back her pradaxa     JEREMIAH (obstructive sleep apnea)- (present on admission)  Assessment & Plan  Noncompliant with CPAP, as she she says she is unable to sleep with the mask over her face.  She reports waking with extreme fatigue and is tired throughout the day.  Prior to the CPAP, she was using oxygen at night.  Request not to use CPAP here as well  - Supplemental oxygen 2 L at at bedtime  - Encourage follow-up for CPAP adjustment    Hyperglycemia- (present on admission)  Assessment & Plan  Blood sugar mildly elevated.  Reports intermittent peripheral neuropathy, which she was suffering from during my exam  -A1c    Situational stress- (present on admission)  Assessment & Plan  Patient's   recently, and she has not really had a place to live since, and has been living with various daughters and grandchildren.  She has had weight loss of about 30 pounds over the last 6 months, has had months of nausea and now with syncope.  Stress may be contributing to her current symptoms.  -Patient would benefit from case management intervention  -Ativan p.o. as needed     Hypothyroidism- (present on admission)  Assessment & Plan  - Continue Synthroid  -TSH levl    GERD (gastroesophageal reflux disease)- (present on admission)  Assessment & Plan  - Continue Protonix    Discussed with SLP,  recommended soft diet and outpatient GI follow-up for history of GERD/dysphagia    HTN (hypertension)- (present on admission)  Assessment & Plan  BP still not well controlled   Increased amlodipine and starting back metoprolol   Close monitoring and adjust as needed           VTE prophylaxis: Pradaxa    I have performed a physical exam and reviewed and updated ROS and Plan today (2023). In review of yesterday's note (2023), there are no changes  except as documented above.

## 2023-08-26 NOTE — ASSESSMENT & PLAN NOTE
Weakness and severe protein-calorie malnutrition due to severe MDD  TSH and B12 WNL  PT/OT/SLP evaluations, recommend post-acute placement but not possible on OBS status  Physiatry consulted, not a candidate for IPR  RNCM assisting with GH coordination, TB Quant negative  HH RN/SW/PT/OT/RD/SLP ordered

## 2023-08-27 PROBLEM — E87.1 HYPONATREMIA: Status: ACTIVE | Noted: 2023-08-27

## 2023-08-27 PROCEDURE — A9270 NON-COVERED ITEM OR SERVICE: HCPCS | Performed by: INTERNAL MEDICINE

## 2023-08-27 PROCEDURE — 700102 HCHG RX REV CODE 250 W/ 637 OVERRIDE(OP): Performed by: STUDENT IN AN ORGANIZED HEALTH CARE EDUCATION/TRAINING PROGRAM

## 2023-08-27 PROCEDURE — 700111 HCHG RX REV CODE 636 W/ 250 OVERRIDE (IP): Performed by: NURSE PRACTITIONER

## 2023-08-27 PROCEDURE — 700102 HCHG RX REV CODE 250 W/ 637 OVERRIDE(OP): Performed by: INTERNAL MEDICINE

## 2023-08-27 PROCEDURE — 99232 SBSQ HOSP IP/OBS MODERATE 35: CPT | Performed by: STUDENT IN AN ORGANIZED HEALTH CARE EDUCATION/TRAINING PROGRAM

## 2023-08-27 PROCEDURE — A9270 NON-COVERED ITEM OR SERVICE: HCPCS | Performed by: NURSE PRACTITIONER

## 2023-08-27 PROCEDURE — 700105 HCHG RX REV CODE 258: Performed by: STUDENT IN AN ORGANIZED HEALTH CARE EDUCATION/TRAINING PROGRAM

## 2023-08-27 PROCEDURE — 700102 HCHG RX REV CODE 250 W/ 637 OVERRIDE(OP): Performed by: NURSE PRACTITIONER

## 2023-08-27 PROCEDURE — G0378 HOSPITAL OBSERVATION PER HR: HCPCS

## 2023-08-27 PROCEDURE — A9270 NON-COVERED ITEM OR SERVICE: HCPCS | Performed by: STUDENT IN AN ORGANIZED HEALTH CARE EDUCATION/TRAINING PROGRAM

## 2023-08-27 RX ADMIN — ACETAMINOPHEN 1000 MG: 500 TABLET, FILM COATED ORAL at 18:26

## 2023-08-27 RX ADMIN — DABIGATRAN ETEXILATE MESYLATE 75 MG: 75 CAPSULE ORAL at 16:36

## 2023-08-27 RX ADMIN — METOPROLOL TARTRATE 25 MG: 25 TABLET, FILM COATED ORAL at 16:36

## 2023-08-27 RX ADMIN — NITROFURANTOIN MONOHYDRATE/MACROCRYSTALLINE 100 MG: 25; 75 CAPSULE ORAL at 16:36

## 2023-08-27 RX ADMIN — AMLODIPINE BESYLATE 10 MG: 10 TABLET ORAL at 04:05

## 2023-08-27 RX ADMIN — MIRTAZAPINE 7.5 MG: 15 TABLET, FILM COATED ORAL at 20:41

## 2023-08-27 RX ADMIN — CITALOPRAM HYDROBROMIDE 10 MG: 20 TABLET ORAL at 04:06

## 2023-08-27 RX ADMIN — OMEPRAZOLE 20 MG: 20 CAPSULE, DELAYED RELEASE ORAL at 04:05

## 2023-08-27 RX ADMIN — ACETAMINOPHEN 1000 MG: 500 TABLET, FILM COATED ORAL at 02:02

## 2023-08-27 RX ADMIN — Medication 1000 UNITS: at 04:06

## 2023-08-27 RX ADMIN — DABIGATRAN ETEXILATE MESYLATE 75 MG: 75 CAPSULE ORAL at 04:06

## 2023-08-27 RX ADMIN — OMEPRAZOLE 20 MG: 20 CAPSULE, DELAYED RELEASE ORAL at 16:36

## 2023-08-27 RX ADMIN — NITROFURANTOIN MONOHYDRATE/MACROCRYSTALLINE 100 MG: 25; 75 CAPSULE ORAL at 08:05

## 2023-08-27 RX ADMIN — POLYETHYLENE GLYCOL 3350 1 PACKET: 17 POWDER, FOR SOLUTION ORAL at 08:05

## 2023-08-27 RX ADMIN — SENNOSIDES AND DOCUSATE SODIUM 2 TABLET: 50; 8.6 TABLET ORAL at 16:36

## 2023-08-27 RX ADMIN — PROCHLORPERAZINE MALEATE 5 MG: 10 TABLET ORAL at 08:05

## 2023-08-27 RX ADMIN — SENNOSIDES AND DOCUSATE SODIUM 2 TABLET: 50; 8.6 TABLET ORAL at 04:04

## 2023-08-27 RX ADMIN — POLYETHYLENE GLYCOL 3350 1 PACKET: 17 POWDER, FOR SOLUTION ORAL at 11:42

## 2023-08-27 RX ADMIN — POLYETHYLENE GLYCOL 3350 1 PACKET: 17 POWDER, FOR SOLUTION ORAL at 16:36

## 2023-08-27 RX ADMIN — LEVOTHYROXINE SODIUM 100 MCG: 0.1 TABLET ORAL at 04:05

## 2023-08-27 RX ADMIN — SODIUM CHLORIDE, POTASSIUM CHLORIDE, SODIUM LACTATE AND CALCIUM CHLORIDE 1000 ML: 600; 310; 30; 20 INJECTION, SOLUTION INTRAVENOUS at 16:21

## 2023-08-27 RX ADMIN — ONDANSETRON 4 MG: 4 TABLET, ORALLY DISINTEGRATING ORAL at 06:35

## 2023-08-27 RX ADMIN — METOPROLOL TARTRATE 25 MG: 25 TABLET, FILM COATED ORAL at 04:04

## 2023-08-27 NOTE — PROGRESS NOTES
4 Eyes Skin Assessment Completed by MANI Malone and MANI Sutton.    Head WDL  Ears WDL  Nose WDL  Mouth WDL  Neck WDL  Breast/Chest WDL  Shoulder Blades WDL  Spine WDL  (R) Arm/Elbow/Hand WDL  (L) Arm/Elbow/Hand Bruising  Abdomen WDL  Groin WDL  Scrotum/Coccyx/Buttocks Scab  (R) Leg WDL  (L) Leg WDL  (R) Heel/Foot/Toe Redness and Blanching  (L) Heel/Foot/Toe Redness and Blanching          Devices In Places Blood Pressure Cuff, Pulse Ox, and SCD's      Interventions In Place Heel Mepilex, Waffle Overlay, TAP System, Pillows, Q2 Turns, Barrier Cream, Dri-Mark Pads, and Pressure Redistribution Mattress    Possible Skin Injury No    Pictures Uploaded Into Epic N/A  Wound Consult Placed N/A  RN Wound Prevention Protocol Ordered Yes

## 2023-08-27 NOTE — CARE PLAN
The patient is Stable - Low risk of patient condition declining or worsening    Shift Goals  Clinical Goals: Bowel movement, increase oral intake  Patient Goals: comfort, bowel movement  Family Goals: Not at bedside    Progress made toward(s) clinical / shift goals:    Problem: Knowledge Deficit - Standard  Goal: Patient and family/care givers will demonstrate understanding of plan of care, disease process/condition, diagnostic tests and medications  Outcome: Progressing     Pt and family updated on POC, new medications and current condition.     Problem: Fall Risk  Goal: Patient will remain free from falls  Outcome: Progressing    Pt call appropriately for assistance and educated on fall precautions.     Problem: Skin Integrity  Goal: Skin integrity is maintained or improved  Outcome: Progressing     Pts skin integrity is maintained.

## 2023-08-27 NOTE — DISCHARGE PLANNING
Case Management Discharge Planning    Admission Date: 8/15/2023  GMLOS:    ALOS: 0    6-Clicks ADL Score: 17  6-Clicks Mobility Score: 6  PT and/or OT Eval ordered: Yes  Post-acute Referrals Ordered: Yes  Post-acute Choice Obtained: Yes  Has referral(s) been sent to post-acute provider:  Yes      Anticipated Discharge Dispo: Discharge Disposition: Discharged to home/self care (01)    DME Needed: No    Action(s) Taken: LSW made phone call to Edd Montgomerydy [phone] and left  with callback request.    LSW made phone call to Denver Woodard [phone] with [facility]. Denver reported he is willing to accept pt at their [facility] location ([facility] [address] [place] [address]) for $4,000/month for 6 months pending the  waiver. Denver reported this is a private room. VARGAS will need to be approved by [facility] leadership.    Meet with pt at bedside to discuss GH acceptance. Pt reported her SSI is $1,102/month. Pt stated she will not be going to a  and declined to elaborate but instead informed this LSW to f/u with MD Wall.    Message sent to [facility] to request pt be screened for Medicaid.    Escalations Completed: None    Medically Clear: No    Next Steps: Care coordination will f/u with care team and [facility] leaders    Barriers to Discharge: Pending Placement    Is the patient up for discharge tomorrow: No

## 2023-08-27 NOTE — CARE PLAN
Problem: Knowledge Deficit - Standard  Goal: Patient and family/care givers will demonstrate understanding of plan of care, disease process/condition, diagnostic tests and medications  Outcome: Progressing     Problem: Fall Risk  Goal: Patient will remain free from falls  Outcome: Progressing     Problem: Depression  Goal: Patient and family/caregiver will verbalize accurate information about at least two of the possible causes of depression, three-four of the signs and symptoms of depression  Outcome: Progressing     Problem: Pain - Standard  Goal: Alleviation of pain or a reduction in pain to the patient’s comfort goal  Outcome: Progressing     Problem: Skin Integrity  Goal: Skin integrity is maintained or improved  Outcome: Progressing   The patient is Stable - Low risk of patient condition declining or worsening    Shift Goals  Clinical Goals: Comfort, discharge planning, bowel movement  Patient Goals: comfort, bowel movement  Family Goals: Eating, mobilizing    Progress made toward(s) clinical / shift goals:      Patient is not progressing towards the following goals:

## 2023-08-27 NOTE — PROGRESS NOTES
[facility] Medicine Daily Progress Note    Date of Service  8/27/2023    Chief Complaint  Felipa Chavez is a 82 y.o. female with CAD, hypothyroid, HTN, and Afib admitted 8/15/2023 with near-syncope and collapse.    Hospital Course  No notes on file    Interval Problem Update    8/27: NESSA ON.  She is expecting a visit from her friend Erik today to determine plan for transitioning back to [place]. She expects to return home with Lu. With her permission, I left multiple VM with Lu to confirm intent to return to [facility] at discharge for coordination of DME/HH. Requested call back or notification when at bedside.  2x BM and 5x UOP charted. Received scheduled miralax and 1 L LR for hydration.  She denies any new symptoms or concerns.    POC discussed with LCSW Alesia. GH rates shared, will discuss at complex discharge committee the extent of VAGRAS and cost to patient. If Mrs. Chavez is unwilling or unable to go to Group Home anticipate discharge to family's care or shelter.    I have discussed this patient's plan of care and discharge plan at IDT rounds today with Case Management, Nursing, Nursing leadership, and other members of the IDT team.    Consultants/Specialty  psychiatry and physiatry    Code Status  DNAR/DNI    Disposition  The patient is medically cleared for discharge to home or a post-acute facility.  Anticipate discharge to: home with organized home healthcare and close outpatient follow-up    I have placed the appropriate orders for post-discharge needs.    Review of Systems  Review of Systems   All other systems reviewed and are negative.       Physical Exam  Temp:  [36.6 °C (97.9 °F)-37.1 °C (98.8 °F)] 36.6 °C (97.9 °F)  Pulse:  [63-76] 63  Resp:  [15-16] 16  BP: (107-171)/(53-72) 141/53  SpO2:  [91 %-97 %] 91 %    Physical Exam  Vitals and nursing note reviewed.   Constitutional:       General: She is sleeping. She is not in acute distress.     Appearance: She is underweight. She is not ill-appearing,  toxic-appearing or diaphoretic.   HENT:      Head:      Comments: Bitemporal wasting     Nose: Nose normal.      Mouth/Throat:      Mouth: Mucous membranes are dry.   Eyes:      General: No scleral icterus.     Conjunctiva/sclera: Conjunctivae normal.   Pulmonary:      Effort: Pulmonary effort is normal.   Abdominal:      General: Abdomen is flat.   Genitourinary:     Comments: No magaña  Musculoskeletal:      Right lower leg: No edema.      Left lower leg: No edema.   Skin:     General: Skin is warm and dry.   Neurological:      Mental Status: She is easily aroused.      Comments: Appropriately conversant. Moves all extremities.   Psychiatric:         Attention and Perception: Attention normal.         Mood and Affect: Mood normal. Affect is blunt.         Speech: Speech normal.         Behavior: Behavior normal. Behavior is cooperative.         Cognition and Memory: Cognition and memory normal.         Fluids    Intake/Output Summary (Last 24 hours) at 8/27/2023 1429  Last data filed at 8/27/2023 0815  Gross per 24 hour   Intake 118 ml   Output --   Net 118 ml         Laboratory                            Imaging  [id number] VIEW   Final Result      No acute process. Large amount of stool.      EC-ECHOCARDIOGRAM COMPLETE W/O CONT   Final Result      DX-CHEST-PORTABLE (1 VIEW)   Final Result      No acute cardiopulmonary abnormality.      CT-CSPINE WITHOUT PLUS RECONS   Final Result      No acute fracture or dislocation of the cervical spine.      Multilevel disc and facet degeneration.      CT-HEAD W/O   Final Result      1.  Chronic microvascular ischemic type changes.   2.  No acute intracranial abnormality.   3.  Right sphenoid sinusitis         IR-US GUIDED PIV    (Results Pending)          Assessment/Plan  * Vasovagal near-syncope- (present on admission)  Assessment & Plan  Per patient report and security footage at bank she did not lose consciousness  Recurrent near-syncope events precipitated by  nausea  Likely vasovagal due to chronic constipation and nausea  CTH negative for acute process  TTE without significant valvular disease  EKG and telemetry NSR without Afib RVR    Hyponatremia  Assessment & Plan  Mild  Likely due to inadequate PO intake  S/p 1L LR yesterday  Repeat BMP only if clinical change    Slow transit constipation- (present on admission)  Assessment & Plan  Likely polyfactorial including chronic idiopathic constipation, poor PO intake, and decreased mobility with possible neurogenic bowel  Reportedly evaluated by colonoscopy previously  Exacerbated by overflow fecal incontinence  Scheduled miralax with PRN doses for catharsis  PRN bisacodyl for refractory constipation    Failure to thrive in adult- (present on admission)  Assessment & Plan  Weakness and severe protein-calorie malnutrition due to severe MDD  TSH and B12 WNL  PT/OT/SLP evaluations, recommend post-acute placement but not possible on OBS status  Physiatry consulted, not a candidate for IPR  RNCM assisting with GH coordination, TB Quant negative  HH RN/SW/PT/OT/RD/SLP ordered    Severe protein-calorie malnutrition (HCC)- (present on admission)  Assessment & Plan  ~25% weight loss (30 lbs) preceding hospitalization  Bitemporal wasting  RD consult, agreeable to Boost supplements  Unrestricted diet, encouraged to request and have family deliver desirable food    KIYA (acute kidney injury) (HCC)- (present on admission)  Assessment & Plan  Resolved with IVF  Likely pre-renal from inadequate PO intake  Encouraging PO hydration    Current severe episode of 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 without prior episode (HCC)- (present on admission)  Assessment & Plan  Due to grief of of loss of  and home  Psychiatry consulted and recommended mirtazapine with psychotherapy  Declined psychology consult  Initiated on citalopram in addition to mirtazapine for severe depression    Acute cystitis without hematuria  Assessment  & Plan  Symptomatic by dysuria 8/23  UA +Pyuria  UCx grew ESBL E coli  Macrobid for 5-day course per pharmacy  Likely colonized from hospitalization, did not meet sepsis criteria nor worsen with ceftriaxone which was resistant    Paroxysmal atrial fibrillation (HCC)- (present on admission)  Assessment & Plan  Continue metoprolol and pradaxa    Chronic nausea- (present on admission)  Assessment & Plan  Uncertain etiology, likely component of slow transit constipation / gastroparesis  Per patient has been evaluated with EGD and Colonoscopy  KUB demonstrates heavy stool burden  Lactulose transitioned to miralax TID for catharsis from constipation  Zofran PRN with compazine PRN second-line    JEREMIAH (obstructive sleep apnea)- (present on admission)  Assessment & Plan  Unable to tolerate CPAP    Prediabetes- (present on admission)  Assessment & Plan  A1c 5.9  Weight loss not indicated due to severe protein-calorie malnutrition  Repeat A1c in 3 months with PCP    Acquired hypothyroidism- (present on admission)  Assessment & Plan  TSH WNL  Continue levothyroxine    Gastroesophageal reflux disease with esophagitis without hemorrhage- (present on admission)  Assessment & Plan  Continue omeprazole    Primary hypertension- (present on admission)  Assessment & Plan  Continue metoprolol and amlodipine       VTE prophylaxis:    therapeutic anticoagulation with pradaxa    I have performed a physical exam and reviewed and updated ROS and Plan today (8/27/2023). In review of yesterday's note (8/26/2023), there are no changes except as documented above.

## 2023-08-27 NOTE — PROGRESS NOTES
4 Eyes Skin Assessment Completed by MANI Malone and MANI Chapman.    Head WDL  Ears WDL  Nose WDL  Mouth WDL  Neck WDL  Breast/Chest WDL  Shoulder Blades WDL  Spine WDL  (R) Arm/Elbow/Hand WDL  (L) Arm/Elbow/Hand Bruising  Abdomen WDL  Groin WDL  Scrotum/Coccyx/Buttocks WDL  (R) Leg WDL  (L) Leg WDL  (R) Heel/Foot/Toe Redness and Blanching  (L) Heel/Foot/Toe Redness and Blanching          Devices In Places Blood Pressure Cuff, Pulse Ox, and SCD's      Interventions In Place Heel Mepilex, Waffle Overlay, TAP System, Pillows, Q2 Turns, Barrier Cream, Dri-Mark Pads, Heels Loaded W/Pillows, and Pressure Redistribution Mattress    Possible Skin Injury No    Pictures Uploaded Into Epic N/A  Wound Consult Placed N/A  RN Wound Prevention Protocol Ordered Yes

## 2023-08-27 NOTE — PROGRESS NOTES
4 Eyes Skin Assessment Completed by Aminah RN and ERIKA RN.    Head WDL  Ears WDL  Nose WDL  Mouth WDL  Neck WDL  Breast/Chest WDL  Shoulder Blades WDL  Spine WDL  (R) Arm/Elbow/Hand WDL  (L) Arm/Elbow/Hand Bruising  Abdomen WDL  Groin WDL  Scrotum/Coccyx/Buttocks WDL  (R) Leg WDL  (L) Leg WDL  (R) Heel/Foot/Toe Redness, Blanching, and Boggy  (L) Heel/Foot/Toe Redness, Blanching, and Boggy          Devices In Places Blood Pressure Cuff and Pulse Ox      Interventions In Place Heel Mepilex, Waffle Overlay, Q2 Turns, and Barrier Cream    Possible Skin Injury No    Pictures Uploaded Into Epic N/A  Wound Consult Placed N/A  RN Wound Prevention Protocol Ordered Yes

## 2023-08-28 PROCEDURE — A9270 NON-COVERED ITEM OR SERVICE: HCPCS | Performed by: NURSE PRACTITIONER

## 2023-08-28 PROCEDURE — 700102 HCHG RX REV CODE 250 W/ 637 OVERRIDE(OP): Performed by: INTERNAL MEDICINE

## 2023-08-28 PROCEDURE — G0378 HOSPITAL OBSERVATION PER HR: HCPCS

## 2023-08-28 PROCEDURE — 700102 HCHG RX REV CODE 250 W/ 637 OVERRIDE(OP): Performed by: STUDENT IN AN ORGANIZED HEALTH CARE EDUCATION/TRAINING PROGRAM

## 2023-08-28 PROCEDURE — 700102 HCHG RX REV CODE 250 W/ 637 OVERRIDE(OP): Performed by: NURSE PRACTITIONER

## 2023-08-28 PROCEDURE — RXMED WILLOW AMBULATORY MEDICATION CHARGE: Performed by: STUDENT IN AN ORGANIZED HEALTH CARE EDUCATION/TRAINING PROGRAM

## 2023-08-28 PROCEDURE — 96376 TX/PRO/DX INJ SAME DRUG ADON: CPT

## 2023-08-28 PROCEDURE — 700111 HCHG RX REV CODE 636 W/ 250 OVERRIDE (IP): Mod: JZ | Performed by: NURSE PRACTITIONER

## 2023-08-28 PROCEDURE — 99232 SBSQ HOSP IP/OBS MODERATE 35: CPT | Performed by: STUDENT IN AN ORGANIZED HEALTH CARE EDUCATION/TRAINING PROGRAM

## 2023-08-28 PROCEDURE — A9270 NON-COVERED ITEM OR SERVICE: HCPCS | Performed by: STUDENT IN AN ORGANIZED HEALTH CARE EDUCATION/TRAINING PROGRAM

## 2023-08-28 PROCEDURE — A9270 NON-COVERED ITEM OR SERVICE: HCPCS | Performed by: INTERNAL MEDICINE

## 2023-08-28 RX ORDER — PROCHLORPERAZINE MALEATE 5 MG/1
5 TABLET ORAL EVERY 6 HOURS PRN
Qty: 30 TABLET | Refills: 0 | Status: SHIPPED | OUTPATIENT
Start: 2023-08-28

## 2023-08-28 RX ORDER — NITROFURANTOIN 25; 75 MG/1; MG/1
100 CAPSULE ORAL 2 TIMES DAILY WITH MEALS
Qty: 6 CAPSULE | Refills: 0 | Status: ACTIVE | OUTPATIENT
Start: 2023-08-28 | End: 2023-08-28 | Stop reason: SDUPTHER

## 2023-08-28 RX ORDER — NITROFURANTOIN 25; 75 MG/1; MG/1
100 CAPSULE ORAL 2 TIMES DAILY WITH MEALS
Qty: 4 CAPSULE | Refills: 0 | Status: ACTIVE | OUTPATIENT
Start: 2023-08-28 | End: 2023-09-01

## 2023-08-28 RX ORDER — POLYETHYLENE GLYCOL 3350 17 G/17G
17 POWDER, FOR SOLUTION ORAL 3 TIMES DAILY PRN
Refills: 3 | COMMUNITY
Start: 2023-08-28

## 2023-08-28 RX ORDER — CITALOPRAM HYDROBROMIDE 10 MG/1
10 TABLET ORAL DAILY
Qty: 30 TABLET | Refills: 0 | Status: SHIPPED | OUTPATIENT
Start: 2023-08-29 | End: 2023-08-28

## 2023-08-28 RX ORDER — ACETAMINOPHEN 500 MG
1000 TABLET ORAL EVERY 6 HOURS PRN
Qty: 30 TABLET | Refills: 0 | COMMUNITY
Start: 2023-08-28

## 2023-08-28 RX ORDER — MIRTAZAPINE 7.5 MG/1
7.5 TABLET, FILM COATED ORAL
Qty: 30 TABLET | Refills: 0 | Status: SHIPPED | OUTPATIENT
Start: 2023-08-28 | End: 2023-08-28 | Stop reason: SDUPTHER

## 2023-08-28 RX ORDER — AMLODIPINE BESYLATE 10 MG/1
10 TABLET ORAL DAILY
Qty: 30 TABLET | Refills: 0 | Status: SHIPPED | OUTPATIENT
Start: 2023-08-29

## 2023-08-28 RX ORDER — MIRTAZAPINE 15 MG/1
15 TABLET, FILM COATED ORAL
Qty: 30 TABLET | Refills: 0 | Status: SHIPPED | OUTPATIENT
Start: 2023-08-28 | End: 2023-09-02

## 2023-08-28 RX ADMIN — OMEPRAZOLE 20 MG: 20 CAPSULE, DELAYED RELEASE ORAL at 04:22

## 2023-08-28 RX ADMIN — Medication 1000 UNITS: at 04:22

## 2023-08-28 RX ADMIN — AMLODIPINE BESYLATE 10 MG: 10 TABLET ORAL at 04:23

## 2023-08-28 RX ADMIN — ONDANSETRON 4 MG: 2 INJECTION INTRAMUSCULAR; INTRAVENOUS at 08:35

## 2023-08-28 RX ADMIN — NITROFURANTOIN MONOHYDRATE/MACROCRYSTALLINE 100 MG: 25; 75 CAPSULE ORAL at 08:35

## 2023-08-28 RX ADMIN — DABIGATRAN ETEXILATE MESYLATE 75 MG: 75 CAPSULE ORAL at 04:23

## 2023-08-28 RX ADMIN — OMEPRAZOLE 20 MG: 20 CAPSULE, DELAYED RELEASE ORAL at 17:30

## 2023-08-28 RX ADMIN — NITROFURANTOIN MONOHYDRATE/MACROCRYSTALLINE 100 MG: 25; 75 CAPSULE ORAL at 17:31

## 2023-08-28 RX ADMIN — CITALOPRAM HYDROBROMIDE 10 MG: 20 TABLET ORAL at 04:22

## 2023-08-28 RX ADMIN — DABIGATRAN ETEXILATE MESYLATE 75 MG: 75 CAPSULE ORAL at 17:31

## 2023-08-28 RX ADMIN — METOPROLOL TARTRATE 25 MG: 25 TABLET, FILM COATED ORAL at 17:30

## 2023-08-28 RX ADMIN — METOPROLOL TARTRATE 25 MG: 25 TABLET, FILM COATED ORAL at 04:23

## 2023-08-28 RX ADMIN — LEVOTHYROXINE SODIUM 100 MCG: 0.1 TABLET ORAL at 04:23

## 2023-08-28 ASSESSMENT — ENCOUNTER SYMPTOMS
CONSTIPATION: 1
NAUSEA: 1
SHORTNESS OF BREATH: 0
MYALGIAS: 1
EYES NEGATIVE: 1
WEIGHT LOSS: 1
TINGLING: 0
DIARRHEA: 1

## 2023-08-28 NOTE — CARE PLAN
The patient is Stable - Low risk of patient condition declining or worsening    Shift Goals  Clinical Goals: safety, rest  Patient Goals: rest  Family Goals: Not at bedside    Progress made toward(s) clinical / shift goals:    Problem: Knowledge Deficit - Standard  Goal: Patient and family/care givers will demonstrate understanding of plan of care, disease process/condition, diagnostic tests and medications  Outcome: Progressing  Note: Pt educated on plan of care, pt verbalizes understanding and agrees to comply.     Problem: Fall Risk  Goal: Patient will remain free from falls  Outcome: Progressing  Note: Bed is locked and in lowest position, call light and personal belongings within reach, bed alarm in place.        Patient is not progressing towards the following goals:

## 2023-08-28 NOTE — CONSULTS
"PSYCHIATRIC FOLLOW-UP:(established)  *Reason for admission: Syncope       *Legal Hold Status:  no legal hold          Chart reviewed.         HPI: Patient reports that she is feeling better. She states that her appetite has increased since starting Remeron and reports that food tastes better. She states that was able to finish her taco meal yesterday. Over the weekend, she did not participate in therapy on Friday due to \"feeling achy.\" Patient denies pain today. Over the weekend patient reports constipation, medication was given and patient now experiencing diarrhea and nausea. Patient denies vomiting. Patient denies SI, HI, or any auditory/visual hallucinations. Patient also denies medication side effects.             Medical ROS (as pertinent):     Review of Systems   Constitutional:  Positive for weight loss.        Increase in appetite   Eyes: Negative.    Respiratory:  Negative for shortness of breath.    Cardiovascular:  Negative for chest pain.   Gastrointestinal:  Positive for constipation, diarrhea and nausea.   Musculoskeletal:  Positive for myalgias.   Neurological:  Negative for tingling.   Endo/Heme/Allergies: Negative.            Psychiatric Examination:  Vitals:   Vitals:    08/28/23 0809   BP: (!) 173/77   Pulse: 74   Resp: 15   Temp: 36 °C (96.8 °F)   SpO2: 93%      Appearance: appears stated age, fair grooming and hygiene, tired, cooperative, intermittent eye contact  Abnormal movements: none  Gait/posture: gait not assessed  Speech: quiet volume, regular rate, tone and rhythm  Though process: linear and goal oriented  Associations: no loose associations  Thought content: not observed responding to internal stimuli, denies paranoia, delusional content not present during interview  Judgement and Insight: fair/fair  Orientation:oriented to person, place, time and situation  Recent and Remote Memory: grossly intact  Attention Span and Concentration: grossly intact  Language: English, fluent  Fund of " "Knowledge: not tested   Mood and Affect:\"just fine yesterday, but today I woke up sick\", tired,  appropriate   SI/HI:denies any active or passive SI/HI      *EK/15/23,    *Imagin/15/23 Head CT, results below  IMPRESSION:     1.  Chronic microvascular ischemic type changes.  2.  No acute intracranial abnormality.  3.  Right sphenoid sinusitis   EEG:  none     Labs personally reviewed (BMP from , CBC from )  Assessment: Patient's appetite and taste have improved with Remeron. Patient has been able to tolerate solid and liquid PO intake. Today, patient denied SI/HI or auditory/visual hallucinations. As symptoms have been improving, we will continue Remeron and monitor for changes. Primary team started Celexa over the weekend, would recommend discontinuing as patient is experiencing nausea and a BMP to monitor patient's sodium. Overall, patient's mood seems to have improved - patient does not meet criteria for a legal hold at this time.       Dx:  #Grief, prolonged  #Major depressive disorder, severe - current  R/o depression due to medical condition    Medical :  - See medical note      Plan:  - Legal hold: no legal hold, patient does not meet criteria at this time  - Psychotropic medications   - Continue Remeron 7.5mg PO qhs for depression and poor appetite  - Recommend DISCONTINUE Citalopram 10mg PO daily due to patient's nausea.   - EKG reviewed  - Old records reviewed  - Collateral obtained, spoke with granddaughter Lu on 23  - Safety plan completed, copy in chart  - Discussed the case with: Dr. Miramontes and Voalte message sent to Dr. Wall  - Psychiatry will follow up     Thank you for the consult.     This note was created using voice recognition software (Dragon). The accuracy of the dictation is limited by the abilities of the software. I have reviewed the note prior to signing. However, error related to voice recognition software and /or scribes may still exist and should be " interpreted within the appropriate context.

## 2023-08-28 NOTE — DISCHARGE PLANNING
Case Management Discharge Planning    Admission Date: 8/15/2023  GMLOS:    ALOS: 0    6-Clicks ADL Score: 17  6-Clicks Mobility Score: 6  PT and/or OT Eval ordered: Yes  Post-acute Referrals Ordered: Yes  Post-acute Choice Obtained: Yes  Has referral(s) been sent to post-acute provider:  Yes      Anticipated Discharge Dispo: Discharge Disposition: Discharged to home/self care (01)    DME Needed: No    Action(s) Taken: Updated Provider/Nurse on Discharge Plan    Escalations Completed: Long Length of Stay Committee  and Leadership    Medically Clear: Yes    Next Steps: Discussed case with Dr. Wall. Awaiting response from  leadership as to whether or not they will approve 1 month co-pay ($3000.00) for . If so, will need to ask   if he will accept for 1 month.    Barriers to Discharge: Pending Placement and No Social Support    Is the patient up for discharge tomorrow: No

## 2023-08-28 NOTE — DISCHARGE PLANNING
Dr. Wall and I met with pt and friend, Edin at bedside. Explained that [facility] has declined 3 month VARGAS, requesting 6 month VARGAS. Leadership to determine if that additional time will be offered. Pt aware that if family declines to accept pt, and [facility] not an option, she will be discharged to shelter.

## 2023-08-29 PROBLEM — Z02.9 DISCHARGE PLANNING ISSUES: Status: ACTIVE | Noted: 2023-08-29

## 2023-08-29 PROCEDURE — A9270 NON-COVERED ITEM OR SERVICE: HCPCS | Performed by: STUDENT IN AN ORGANIZED HEALTH CARE EDUCATION/TRAINING PROGRAM

## 2023-08-29 PROCEDURE — A9270 NON-COVERED ITEM OR SERVICE: HCPCS | Performed by: NURSE PRACTITIONER

## 2023-08-29 PROCEDURE — 700102 HCHG RX REV CODE 250 W/ 637 OVERRIDE(OP): Performed by: STUDENT IN AN ORGANIZED HEALTH CARE EDUCATION/TRAINING PROGRAM

## 2023-08-29 PROCEDURE — 700102 HCHG RX REV CODE 250 W/ 637 OVERRIDE(OP): Performed by: NURSE PRACTITIONER

## 2023-08-29 PROCEDURE — 700102 HCHG RX REV CODE 250 W/ 637 OVERRIDE(OP): Performed by: INTERNAL MEDICINE

## 2023-08-29 PROCEDURE — 99232 SBSQ HOSP IP/OBS MODERATE 35: CPT | Performed by: STUDENT IN AN ORGANIZED HEALTH CARE EDUCATION/TRAINING PROGRAM

## 2023-08-29 PROCEDURE — 97535 SELF CARE MNGMENT TRAINING: CPT

## 2023-08-29 PROCEDURE — G0378 HOSPITAL OBSERVATION PER HR: HCPCS

## 2023-08-29 PROCEDURE — A9270 NON-COVERED ITEM OR SERVICE: HCPCS | Performed by: INTERNAL MEDICINE

## 2023-08-29 PROCEDURE — 96376 TX/PRO/DX INJ SAME DRUG ADON: CPT

## 2023-08-29 PROCEDURE — 700111 HCHG RX REV CODE 636 W/ 250 OVERRIDE (IP): Mod: JZ | Performed by: NURSE PRACTITIONER

## 2023-08-29 RX ORDER — MIRTAZAPINE 15 MG/1
15 TABLET, FILM COATED ORAL
Status: DISCONTINUED | OUTPATIENT
Start: 2023-08-29 | End: 2023-09-02 | Stop reason: HOSPADM

## 2023-08-29 RX ADMIN — LEVOTHYROXINE SODIUM 100 MCG: 0.1 TABLET ORAL at 10:19

## 2023-08-29 RX ADMIN — MIRTAZAPINE 15 MG: 15 TABLET, FILM COATED ORAL at 23:23

## 2023-08-29 RX ADMIN — OMEPRAZOLE 20 MG: 20 CAPSULE, DELAYED RELEASE ORAL at 16:43

## 2023-08-29 RX ADMIN — AMLODIPINE BESYLATE 10 MG: 10 TABLET ORAL at 10:20

## 2023-08-29 RX ADMIN — OMEPRAZOLE 20 MG: 20 CAPSULE, DELAYED RELEASE ORAL at 10:20

## 2023-08-29 RX ADMIN — DABIGATRAN ETEXILATE MESYLATE 75 MG: 75 CAPSULE ORAL at 23:23

## 2023-08-29 RX ADMIN — Medication 1000 UNITS: at 10:21

## 2023-08-29 RX ADMIN — DABIGATRAN ETEXILATE MESYLATE 75 MG: 75 CAPSULE ORAL at 10:25

## 2023-08-29 RX ADMIN — NITROFURANTOIN MONOHYDRATE/MACROCRYSTALLINE 100 MG: 25; 75 CAPSULE ORAL at 16:43

## 2023-08-29 RX ADMIN — NITROFURANTOIN MONOHYDRATE/MACROCRYSTALLINE 100 MG: 25; 75 CAPSULE ORAL at 10:21

## 2023-08-29 RX ADMIN — METOPROLOL TARTRATE 25 MG: 25 TABLET, FILM COATED ORAL at 16:43

## 2023-08-29 RX ADMIN — ONDANSETRON 4 MG: 2 INJECTION INTRAMUSCULAR; INTRAVENOUS at 10:29

## 2023-08-29 ASSESSMENT — COGNITIVE AND FUNCTIONAL STATUS - GENERAL
DAILY ACTIVITIY SCORE: 18
HELP NEEDED FOR BATHING: A LITTLE
DRESSING REGULAR UPPER BODY CLOTHING: A LITTLE
PERSONAL GROOMING: A LITTLE
DRESSING REGULAR LOWER BODY CLOTHING: A LITTLE
TOILETING: A LITTLE
SUGGESTED CMS G CODE MODIFIER DAILY ACTIVITY: CK
EATING MEALS: A LITTLE

## 2023-08-29 ASSESSMENT — ENCOUNTER SYMPTOMS
ABDOMINAL PAIN: 0
SHORTNESS OF BREATH: 0
FEVER: 0
PALPITATIONS: 0
MYALGIAS: 0
VOMITING: 0
COUGH: 0
CHILLS: 0
HEADACHES: 0
NAUSEA: 0
DIZZINESS: 0

## 2023-08-29 ASSESSMENT — PAIN DESCRIPTION - PAIN TYPE: TYPE: ACUTE PAIN

## 2023-08-29 NOTE — DISCHARGE INSTR - CASE MGT
Follow up psych with Dr. Ambika Miramontes on 9/6 at 2pm  [address] [place]

## 2023-08-29 NOTE — THERAPY
"Occupational Therapy  Daily Treatment     Patient Name: Felipa Chavez  Age:  82 y.o., Sex:  female  Medical Record #: [id number]  Today's Date: 8/29/2023     Precautions  Precautions: Fall Risk    Assessment    Pt reports she has not really gotten out of bed since last session. She was able to participate in functional mobility to the bathroom w/ 4ww for toileting. She was agreeable to sitting in the recliner at the end of the session. Remains limited by weakness, fatigue, impaired balance.    Plan    Treatment Plan Status: Continue Current Treatment Plan  Type of Treatment: Self Care / Activities of Daily Living, Neuro Re-Education / Balance, Therapeutic Exercises, Therapeutic Activity, Adaptive Equipment  Treatment Frequency: 3 Times per Week  Treatment Duration: Until Therapy Goals Met    DC Equipment Recommendations: Unable to determine at this time  Discharge Recommendations: Recommend post-acute placement for additional occupational therapy services prior to discharge home    Subjective    \"Is it possible for a person to sleep too much?\"     Objective       08/29/23 1020   Cognition    Cognition / Consciousness X   Level of Consciousness Alert   Comments pleasant and cooperative, delayed and withdrawn throughout, needs encouragement to participate   Active ROM Upper Body   Active ROM Upper Body  WDL   Strength Upper Body   Upper Body Strength  WDL   Balance   Sitting Balance (Static) Fair +   Sitting Balance (Dynamic) Fair   Standing Balance (Static) Poor +   Standing Balance (Dynamic) Poor +   Weight Shift Sitting Fair   Weight Shift Standing Fair   Skilled Intervention Verbal Cuing;Tactile Cuing;Sequencing   Comments w/ 4ww   Bed Mobility    Supine to Sit Minimal Assist   Skilled Intervention Verbal Cuing;Tactile Cuing;Sequencing   Comments in recliner post   Activities of Daily Living   Eating Supervision   Grooming Supervision;Seated  (wash face, wash hands)   Toileting Minimal Assist   Skilled Intervention " Tactile Cuing;Sequencing;Verbal Cuing   How much help from another person does the patient currently need...   Putting on and taking off regular lower body clothing? 3   Bathing (including washing, rinsing, and drying)? 3   Toileting, which includes using a toilet, bedpan, or urinal? 3   Putting on and taking off regular upper body clothing? 3   Taking care of personal grooming such as brushing teeth? 3   Eating meals? 3   6 Clicks Daily Activity Score 18   Functional Mobility   Sit to Stand Minimal Assist   Bed, Chair, Wheelchair Transfer Minimal Assist   Toilet Transfers Minimal Assist   Mobility EOB>BR>Recliner   Skilled Intervention Verbal Cuing;Tactile Cuing;Sequencing   Comments w/ 4ww   Patient / Family Goals   Patient / Family Goal #1 get stronger   Short Term Goals   Short Term Goal # 1 pt will complete toilet transfer with SPV   Goal Outcome # 1 Progressing as expected   Short Term Goal # 2 pt will complete pericare at SPV level   Goal Outcome # 2 Progressing as expected   Short Term Goal # 3 pt will tolerate >5min standing grooming with SPV   Goal Outcome # 3 Goal not met

## 2023-08-29 NOTE — DISCHARGE PLANNING
Spoke to supervisor Brigido. DD committee recommending home with family, will not approve 6 month VARGAS. Called [facility] to cancel plan for .   Met with Dr. Barrera, he will speak to family about possible Palliative care.

## 2023-08-29 NOTE — DISCHARGE PLANNING
Spoke to [facility] at [facility]  he will accept pt with 6 mo VARGAS at $4000.00/month pt to contribute $1100.00. [facility] will send me documents required. They have hospital bed but pt will need W/C. They prefer to use [facility] or [facility] .

## 2023-08-29 NOTE — PROGRESS NOTES
Hospital Medicine Daily Progress Note    Date of Service  8/28/2023    Chief Complaint  Felipa Chavez is a 82 y.o. female with CAD, hypothyroid, HTN, and Afib admitted 8/15/2023 with near-syncope and collapse.    Hospital Course  No notes on file    Interval Problem Update    8/28: NESSA ON  She is having increased nausea today. Appetite is slightly improved. Multiple BM overnight and today.  POC discussed with Psychiatrist Dr. Miramontes, who advised increase in mirtazapine and hold celexa for now due to nausea. She will follow up in clinic.  POC discussed with RNNAVI Kim and GIOVANA Lambert. VARGAS approved for 6 months at . Unable to complete paperwork and set up transport this evening.  Advised Mrs. Chavez and [place] that HH services are intermittent and  staff will provide 24/7 care.  Advised Mrs. Chavez that rehabilitation with HHPT/OT will end if she declines to work with them. It is important that she participate to functionally improve and return to [place].    I have discussed this patient's plan of care and discharge plan at IDT rounds today with Case Management, Nursing, Nursing leadership, and other members of the IDT team.    Consultants/Specialty  psychiatry and physiatry    Code Status  DNAR/DNI    Disposition  The patient is medically cleared for discharge to home or a post-acute facility.  Anticipate discharge to: home with organized home healthcare and close outpatient follow-up    I have placed the appropriate orders for post-discharge needs.    Review of Systems  Review of Systems   All other systems reviewed and are negative.       Physical Exam  Temp:  [36 °C (96.8 °F)-37.3 °C (99.1 °F)] 36.7 °C (98.1 °F)  Pulse:  [62-85] 71  Resp:  [15-17] 17  BP: (119-187)/(51-91) 119/51  SpO2:  [89 %-95 %] 94 %    Physical Exam  Vitals and nursing note reviewed. Exam conducted with a chaperone present (Friend Erik and RNNAVI Kim at bedside).   Constitutional:       General: She is sleeping. She is not in acute distress.      Appearance: She is underweight. She is not ill-appearing, toxic-appearing or diaphoretic.   HENT:      Head:      Comments: Bitemporal wasting     Nose: Nose normal.      Mouth/Throat:      Mouth: Mucous membranes are moist.   Eyes:      General: No scleral icterus.     Conjunctiva/sclera: Conjunctivae normal.   Pulmonary:      Effort: Pulmonary effort is normal.   Abdominal:      General: Abdomen is flat.   Genitourinary:     Comments: No magaña  Musculoskeletal:      Right lower leg: No edema.      Left lower leg: No edema.   Skin:     General: Skin is warm and dry.   Neurological:      Mental Status: She is easily aroused.      Comments: Appropriately conversant. Moves all extremities.   Psychiatric:         Attention and Perception: Attention normal.         Mood and Affect: Mood normal. Affect is blunt.         Speech: Speech normal.         Behavior: Behavior normal. Behavior is cooperative.         Cognition and Memory: Cognition and memory normal.         Fluids  No intake or output data in the 24 hours ending 08/28/23 1933      Laboratory                            Imaging  [id number] VIEW   Final Result      No acute process. Large amount of stool.      EC-ECHOCARDIOGRAM COMPLETE W/O CONT   Final Result      DX-CHEST-PORTABLE (1 VIEW)   Final Result      No acute cardiopulmonary abnormality.      CT-CSPINE WITHOUT PLUS RECONS   Final Result      No acute fracture or dislocation of the cervical spine.      Multilevel disc and facet degeneration.      CT-HEAD W/O   Final Result      1.  Chronic microvascular ischemic type changes.   2.  No acute intracranial abnormality.   3.  Right sphenoid sinusitis                Assessment/Plan  * Vasovagal near-syncope- (present on admission)  Assessment & Plan  Per patient report and security footage at bank she did not lose consciousness  Recurrent near-syncope events precipitated by nausea  Likely vasovagal due to chronic constipation and nausea  CTH negative for  acute process  TTE without significant valvular disease  EKG and telemetry NSR without Afib RVR    Hyponatremia  Assessment & Plan  Mild  Likely due to inadequate PO intake  Repeat BMP only if clinical change    Slow transit constipation- (present on admission)  Assessment & Plan  Likely polyfactorial including chronic idiopathic constipation, poor PO intake, and decreased mobility with possible neurogenic bowel  Reportedly evaluated by colonoscopy previously  Exacerbated by overflow fecal incontinence  Scheduled miralax with PRN doses for catharsis  PRN bisacodyl for refractory constipation    Failure to thrive in adult- (present on admission)  Assessment & Plan  Weakness and severe protein-calorie malnutrition due to severe MDD  TSH and B12 WNL  PT/OT/SLP evaluations, recommend post-acute placement but not possible on OBS status  Physiatry consulted, not a candidate for IPR  RNCM assisting with [facility] coordination, TB Quant negative  HH RN/SW/PT/OT/RD/SLP ordered    Severe protein-calorie malnutrition (HCC)- (present on admission)  Assessment & Plan  ~25% weight loss (30 lbs) preceding hospitalization  Bitemporal wasting  RD consult, agreeable to Boost supplements  Unrestricted diet, encouraged to request and have family deliver desirable food    KIYA (acute kidney injury) (HCC)- (present on admission)  Assessment & Plan  Resolved with IVF  Likely pre-renal from inadequate PO intake  Encouraging PO hydration    Current 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 Miramontes scheduled    Acute cystitis without hematuria  Assessment & Plan  Symptomatic by dysuria 8/23  UA  +Pyuria  UCx grew ESBL E coli  Macrobid for 5-day course per pharmacy  Likely colonized from hospitalization, did not meet sepsis criteria nor worsen with ceftriaxone which was resistant    Paroxysmal atrial fibrillation (HCC)- (present on admission)  Assessment & Plan  Continue metoprolol and pradaxa    Chronic nausea- (present on admission)  Assessment & Plan  Uncertain etiology, likely component of slow transit constipation / gastroparesis  Per patient has been evaluated with EGD and Colonoscopy  KUB demonstrates heavy stool burden  Lactulose transitioned to miralax TID for catharsis from constipation  Zofran PRN with compazine PRN second-line    JEREMIAH (obstructive sleep apnea)- (present on admission)  Assessment & Plan  Unable to tolerate CPAP    Prediabetes- (present on admission)  Assessment & Plan  A1c 5.9  Weight loss not indicated due to severe protein-calorie malnutrition  Repeat A1c in 3 months with PCP    Acquired hypothyroidism- (present on admission)  Assessment & Plan  TSH WNL  Continue levothyroxine    Gastroesophageal reflux disease with esophagitis without hemorrhage- (present on admission)  Assessment & Plan  Continue omeprazole    Primary hypertension- (present on admission)  Assessment & Plan  Continue metoprolol and amlodipine       VTE prophylaxis:    therapeutic anticoagulation with pradaxa    I have performed a physical exam and reviewed and updated ROS and Plan today (8/28/2023). In review of yesterday's note (8/27/2023), there are no changes except as documented above.

## 2023-08-29 NOTE — PROGRESS NOTES
"Upon giving scheduled evening medication and encourage patient to supplement her meals which she is hardly eating.  She voiced to me that she \"I'm tired of trying\"  \"Why won't they let me die\". She would like hospice to see her she states.  She denies a plan to harm herself.  She states she would not harm herself.  I notified Dr. Barrera of above, as well a charge nurse, and Dr. Levy.   "

## 2023-08-29 NOTE — CONSULTS
"PSYCHIATRIC FOLLOW UP:    Reason for Admission: syncope  Reason for Consult: depression  Source of Information: patient and granddaughter      Subjective:  Patient is a 82 y.o. female with history of depression seen today for followup of grief and depression. The patient appears sleepy today but is still able to arouse easily. She states she is frustrated and feels hopeless today. She denies any thoughts of harming herself because of her [place] beliefs but at the same time feels ready for \"the lord to take me.\" She cannot identify anything that would make her feel better today. She does state nausea is better today but she still has not eaten much. She denied problems sleeping. She feels tired. Denies HI or psychosis. Agreed to a trial increase in mirtazepine.    Spoke with nursing staff who state that the patient was able to walk a short distance with them and sit up in a chair but energy level appears low. She does appear depressed. Of note she refused her bedtime meds last night but did take meds with encouragement today. She has had poor oral intake. No other behavioral concerns    With patient's permission spoke with her granddaughter Airam who states that the patient is frustrated and upset today because she found out that she cannot live with her friend in [facility]. She agrees that the patient sounds more depressed today. She agreed to the trial of mirtazepine.    Psychiatric Examination:   Vitals: /59   Pulse 75   Temp 36.9 °C (98.4 °F) (Temporal)   Resp 16   Ht 1.499 m (4' 11.02\")   Wt 40.9 kg (90 lb 2.7 oz)   SpO2 91%   BMI 18.20 kg/m²   Musculoskeletal: No abnormal movements noted, no tremors, clonus, tics, cogwheeling/rigidity  Appearance:  fair grooming, thin, appears  stated age , evasive  Thoughts:  linear and organized  Speech:  soft, mumbling  Mood: \"depressed\"  Affect: dysthymic  SI/HI: Denies SI and HI  Alert/Oriented: alert and oriented  Memory: no gross impairment in " immediate, recent, or remote memory  Fund of Knowledge: adequate  Insight/Judgement into symptoms: fair  Neurological Testing: MMSE not performed during this encounter    Medications (currently prescribed at [facility]):    Current Facility-Administered Medications:     mirtazapine (Remeron) tablet 15 mg, 15 mg, Oral, QHS, Ambika Bangura D.O.    nitrofurantoin (Macrobid) capsule 100 mg, 100 mg, Oral, BID WITH MEALS, Casie Fuentes M.D., 100 mg at 08/29/23 1021    prochlorperazine (Compazine) tablet 5 mg, 5 mg, Oral, Q6HRS PRN, Prashant Wall M.D., 5 mg at 08/27/23 0805    prochlorperazine (Compazine) injection 10 mg, 10 mg, Intravenous, Q6HRS PRN, Prashant Wall M.D.    polyethylene glycol/lytes (Miralax) PACKET 1 Packet, 1 Packet, Oral, TID PRN, Prashant Wall M.D.    acetaminophen (Tylenol) tablet 1,000 mg, 1,000 mg, Oral, Q6HRS PRN, Prashant Wall M.D., 1,000 mg at 08/27/23 1826    polyethylene glycol/lytes (Miralax) PACKET 1 Packet, 1 Packet, Oral, TID WITH MEALS, Prashant Wall M.D., 1 Packet at 08/27/23 1636    dabigatran (Pradaxa) capsule 75 mg, 75 mg, Oral, BID, Casie Fuentes M.D., 75 mg at 08/29/23 1025    senna-docusate (Pericolace Or Senokot S) 8.6-50 MG per tablet 2 Tablet, 2 Tablet, Oral, BID, Steffany Sanchez M.D., 2 Tablet at 08/27/23 1636    amLODIPine (Norvasc) tablet 10 mg, 10 mg, Oral, Q DAY, Steffany Sanchez M.D., 10 mg at 08/29/23 1020    metoprolol tartrate (Lopressor) tablet 25 mg, 25 mg, Oral, TWICE DAILY, Steffany Sanchez M.D., 25 mg at 08/28/23 1730    [DISCONTINUED] senna-docusate (Pericolace Or Senokot S) 8.6-50 MG per tablet 2 Tablet, 2 Tablet, Oral, BID PRN, 2 Tablet at 08/18/23 1707 **AND** [DISCONTINUED] polyethylene glycol/lytes (Miralax) PACKET 1 Packet, 1 Packet, Oral, QDAY PRN **AND** [DISCONTINUED] magnesium hydroxide (Milk Of Magnesia) suspension 30 mL, 30 mL, Oral, QDAY PRN **AND** bisacodyl (Dulcolax) suppository 10 mg, 10 mg, Rectal, QDAY PRN, Ghazal August, A.P.R.N., 10 mg at 08/26/23 2031     ondansetron (Zofran) syringe/vial injection 4 mg, 4 mg, Intravenous, Q6HRS PRN, Ghazal August, A.P.R.N., 4 mg at 08/29/23 1029    ondansetron (Zofran ODT) dispertab 4 mg, 4 mg, Oral, Q6HRS PRN, Ghazal Hankinst, A.P.R.N., 4 mg at 08/27/23 0635    vitamin D3 (Cholecalciferol) tablet 1,000 Units, 1,000 Units, Oral, DAILY, Ghazal August, A.P.R.N., 1,000 Units at 08/29/23 1021    omeprazole (PriLOSEC) capsule 20 mg, 20 mg, Oral, BID, Ghazal Hankinst, A.P.R.N., 20 mg at 08/29/23 1020    levothyroxine (Synthroid) tablet 100 mcg, 100 mcg, Oral, AM ES, Ghazal Hankinst, A.P.R.N., 100 mcg at 08/29/23 1019  Labs:        Reviewed, none since 8/24/23                        Assessment:  Patient does appear more depressed today, possible related to situational factor related to disposition at discharge. She endorses improved nausea but still has poor appetite, low energy and low mood. More hopeless today but still denies SI/HI. No psychosis noted. Still oriented and alert    Diagnosis  #Grief, prolonged  #Major depressive disorder, severe - current  R/o depression due to medical condition    Plan:  - - Legal hold: no legal hold, patient does not meet criteria at this time  - Psychotropic medications              - Increase Remeron to 15 mg PO qhs for depression and poor appetite  - EKG reviewed  - Old records reviewed  - Collateral obtained, spoke with granddaughter Lu today   - Safety plan completed, copy in chart  -   Voalte message sent to Dr. Barrera with above plan, granddaughter also aware    - Psychiatry will follow up      Thank you for the consult.

## 2023-08-29 NOTE — CARE PLAN
The patient is Stable - Low risk of patient condition declining or worsening    Shift Goals  Clinical Goals: pain control, safety, mobilization, dc planning, dietary intake  Patient Goals: wants to rest  Family Goals: updated over the phone    Progress made toward(s) clinical / shift goals: Patient requires additional time for encouragement and oob.  Bed alarm, chair alarm, and falls precautions in place.  Slow to mobilize but able to with FWW, checked oxygen no desaturations with  and mobilizing.  Encouraging supplements as only eating some of her meals.     Patient is not progressing towards the following goals:

## 2023-08-29 NOTE — DIETARY
Nutrition Update:    Day 0 of admit.  Felipa Chavez is a 82 y.o. female with admitting DX of Syncope and collapse [R55].  Patient being followed to optimize nutrition.    Current Diet: L6: soft and bite sized with thin liquids and supplements; PO intake 0-<50% of all meals and many times patient will refuse. No meals recorded today.    Per discussion with RN, poor appetite is likely related to depression and lack of motivation to eat. Pt sleeps often and has multiple unopened Boost supplements sitting at bedside. Pt is currently receiving remeron and reports that appetite has started to improve. Will plan to send high calorie Boost that can be used as meal replacements. If pt drinks 2 per day, this will meet daily nutritional goal. RN agreeable with this plan and will encourage pt to drink 2 per day. RD to also send magic cups as an alternative supplement.     Problem: Nutritional:  Goal: Achieve adequate nutritional intake  Description: Patient will consume ~50% of meals  Outcome: Not met    Recommendations:  Provide Boost VHC TID and high protein snacks  Continue Remeron to stimulate appetite   Encourage intake ~50% of meals and supplements  Monitor weight trends     RD following.

## 2023-08-29 NOTE — THERAPY
Physical Therapy Contact Note    Patient Name: Felipa Chavez  Age:  82 y.o., Sex:  female  Medical Record #: [id number]  Today's Date: 8/28/2023 08/28/23 1700   Interdisciplinary Plan of Care Collaboration   IDT Collaboration with  Certified Nursing Assistant   Collaboration Comments Pt sitting up in bed eating. Pt on a new medication that stimulates the appetite. Will allow pt to eat and attempt to assist pt at another time.

## 2023-08-30 PROBLEM — Z51.5 PALLIATIVE CARE ENCOUNTER: Status: ACTIVE | Noted: 2023-08-30

## 2023-08-30 PROBLEM — Z66 DNR (DO NOT RESUSCITATE): Status: ACTIVE | Noted: 2023-08-30

## 2023-08-30 PROCEDURE — 99231 SBSQ HOSP IP/OBS SF/LOW 25: CPT | Mod: GC | Performed by: STUDENT IN AN ORGANIZED HEALTH CARE EDUCATION/TRAINING PROGRAM

## 2023-08-30 PROCEDURE — 700102 HCHG RX REV CODE 250 W/ 637 OVERRIDE(OP): Performed by: INTERNAL MEDICINE

## 2023-08-30 PROCEDURE — 700102 HCHG RX REV CODE 250 W/ 637 OVERRIDE(OP): Performed by: STUDENT IN AN ORGANIZED HEALTH CARE EDUCATION/TRAINING PROGRAM

## 2023-08-30 PROCEDURE — 99497 ADVNCD CARE PLAN 30 MIN: CPT | Performed by: NURSE PRACTITIONER

## 2023-08-30 PROCEDURE — 99232 SBSQ HOSP IP/OBS MODERATE 35: CPT | Performed by: STUDENT IN AN ORGANIZED HEALTH CARE EDUCATION/TRAINING PROGRAM

## 2023-08-30 PROCEDURE — 99222 1ST HOSP IP/OBS MODERATE 55: CPT | Mod: 25 | Performed by: NURSE PRACTITIONER

## 2023-08-30 PROCEDURE — A9270 NON-COVERED ITEM OR SERVICE: HCPCS | Performed by: STUDENT IN AN ORGANIZED HEALTH CARE EDUCATION/TRAINING PROGRAM

## 2023-08-30 PROCEDURE — A9270 NON-COVERED ITEM OR SERVICE: HCPCS | Performed by: NURSE PRACTITIONER

## 2023-08-30 PROCEDURE — A9270 NON-COVERED ITEM OR SERVICE: HCPCS | Performed by: INTERNAL MEDICINE

## 2023-08-30 PROCEDURE — 700102 HCHG RX REV CODE 250 W/ 637 OVERRIDE(OP): Performed by: NURSE PRACTITIONER

## 2023-08-30 PROCEDURE — G0378 HOSPITAL OBSERVATION PER HR: HCPCS

## 2023-08-30 RX ADMIN — DABIGATRAN ETEXILATE MESYLATE 75 MG: 75 CAPSULE ORAL at 05:41

## 2023-08-30 RX ADMIN — Medication 1000 UNITS: at 05:41

## 2023-08-30 RX ADMIN — LEVOTHYROXINE SODIUM 100 MCG: 0.1 TABLET ORAL at 05:41

## 2023-08-30 RX ADMIN — OMEPRAZOLE 20 MG: 20 CAPSULE, DELAYED RELEASE ORAL at 05:41

## 2023-08-30 RX ADMIN — OMEPRAZOLE 20 MG: 20 CAPSULE, DELAYED RELEASE ORAL at 16:55

## 2023-08-30 RX ADMIN — DABIGATRAN ETEXILATE MESYLATE 75 MG: 75 CAPSULE ORAL at 16:55

## 2023-08-30 RX ADMIN — ACETAMINOPHEN 1000 MG: 500 TABLET, FILM COATED ORAL at 01:23

## 2023-08-30 RX ADMIN — METOPROLOL TARTRATE 25 MG: 25 TABLET, FILM COATED ORAL at 05:41

## 2023-08-30 RX ADMIN — METOPROLOL TARTRATE 25 MG: 25 TABLET, FILM COATED ORAL at 16:55

## 2023-08-30 RX ADMIN — AMLODIPINE BESYLATE 10 MG: 10 TABLET ORAL at 05:41

## 2023-08-30 RX ADMIN — SENNOSIDES AND DOCUSATE SODIUM 2 TABLET: 50; 8.6 TABLET ORAL at 05:41

## 2023-08-30 RX ADMIN — MIRTAZAPINE 15 MG: 15 TABLET, FILM COATED ORAL at 20:41

## 2023-08-30 RX ADMIN — SENNOSIDES AND DOCUSATE SODIUM 2 TABLET: 50; 8.6 TABLET ORAL at 16:55

## 2023-08-30 RX ADMIN — POLYETHYLENE GLYCOL 3350 1 PACKET: 17 POWDER, FOR SOLUTION ORAL at 12:08

## 2023-08-30 RX ADMIN — POLYETHYLENE GLYCOL 3350 1 PACKET: 17 POWDER, FOR SOLUTION ORAL at 16:55

## 2023-08-30 RX ADMIN — NITROFURANTOIN MONOHYDRATE/MACROCRYSTALLINE 100 MG: 25; 75 CAPSULE ORAL at 16:55

## 2023-08-30 ASSESSMENT — ENCOUNTER SYMPTOMS
WEIGHT LOSS: 1
COUGH: 0
DEPRESSION: 1
NAUSEA: 0
MYALGIAS: 0
PALPITATIONS: 0
SHORTNESS OF BREATH: 1
HEADACHES: 0
SHORTNESS OF BREATH: 0
DIARRHEA: 0
WEAKNESS: 1
VOMITING: 0
CONSTIPATION: 0
FEVER: 0
ABDOMINAL PAIN: 0
CHILLS: 0
DIZZINESS: 0

## 2023-08-30 ASSESSMENT — CHA2DS2 SCORE
VASCULAR DISEASE: YES
AGE 75 OR GREATER: YES
PRIOR STROKE OR TIA OR THROMBOEMBOLISM: NO
SEX: FEMALE
CHF OR LEFT VENTRICULAR DYSFUNCTION: NO
AGE 65 TO 74: NO
CHA2DS2 VASC SCORE: 4
HYPERTENSION: NO
DIABETES: NO

## 2023-08-30 ASSESSMENT — PAIN DESCRIPTION - PAIN TYPE: TYPE: ACUTE PAIN

## 2023-08-30 NOTE — DISCHARGE PLANNING
Case Management Discharge Planning    Admission Date: 8/15/2023  GMLOS:    ALOS: 0    6-Clicks ADL Score: 18  6-Clicks Mobility Score: 6  PT and/or OT Eval ordered: Yes  Post-acute Referrals Ordered: Yes  Post-acute Choice Obtained: Yes  Has referral(s) been sent to post-acute provider:  Yes    Anticipated Discharge Dispo: Discharge Disposition: Discharged to home/self care (01)  Plan for GH with Hospice    DME Needed: No    Action(s) Taken: Updated Provider/Nurse on Discharge Plan and Choice obtained    RN NAVI spoke to Saima from Palliative Care. Per Saima, she spoke to pt and pt agreed with hospice and she placed hospice referral.     RN CM spoke to pt at bedside. Discussed discharge plan. Pt confirmed she spoke to Palliative and she agreed to hospice referral. Received verbal hospice choice. Pt is on isolation. Choice form faxed to DPA. Discussed placement option. Per pt, she does not have a home, was living with grandchild prior to admission, agreeable to discharge to group home with hospice, agreeable with need to contribute with payment as needed, discussed group home waiver process. Pt verbalized understanding. Per pt, her income is around $1000 per month from [employer], she has access to her income and does not have a financial DPOA at his time but is working on it.     Escalated to HCM Manager. Provided update with hospice referral. Per Leadership, they will only approve for 3 months [facility]ARGAS at a time, pending hospice acceptance.    Escalations Completed: Leadership    Medically Clear: No    Next Steps:   Follow up with treatment team for medical clearance and discharge planning needs.  Follow up for hospice acceptance and group home acceptance, [facility]    Barriers to Discharge: Medical clearance and Pending Placement    Is the patient up for discharge tomorrow: No

## 2023-08-30 NOTE — ASSESSMENT & PLAN NOTE
Discussed with complex discharge planning committee.  Patient has too much assets to qualify for group home waiver.  She has not been cooperating with physical therapy.  Refusing to eat.    Patient is trying to get accepted to a group home with home health.

## 2023-08-30 NOTE — CARE PLAN
The patient is Stable - Low risk of patient condition declining or worsening    Shift Goals  Clinical Goals: Safety, comfort  Patient Goals: rest  Family Goals: updated over the phone    Progress made toward(s) clinical / shift goals:  Pt called for help before transferring to [facility].  She requested analgesics as needed.      Problem: Knowledge Deficit - Standard  Goal: Patient and family/care givers will demonstrate understanding of plan of care, disease process/condition, diagnostic tests and medications  Outcome: Progressing     Problem: Fall Risk  Goal: Patient will remain free from falls  Outcome: Progressing     Problem: Depression  Goal: Patient and family/caregiver will verbalize accurate information about at least two of the possible causes of depression, three-four of the signs and symptoms of depression  Outcome: Progressing     Problem: Pain - Standard  Goal: Alleviation of pain or a reduction in pain to the patient’s comfort goal  Outcome: Progressing        Patient is not progressing towards the following goals:

## 2023-08-30 NOTE — CARE PLAN
The patient is Watcher - Medium risk of patient condition declining or worsening    Shift Goals  Clinical Goals: comfort  Patient Goals: rest  Family Goals: updated over the phone    Progress made toward(s) clinical / shift goals:    Problem: Pain - Standard  Goal: Alleviation of pain or a reduction in pain to the patient’s comfort goal  Outcome: Progressing   Pt denies pain.    Patient is not progressing towards the following goals:      Problem: Knowledge Deficit - Standard  Goal: Patient and family/care givers will demonstrate understanding of plan of care, disease process/condition, diagnostic tests and medications  Outcome: Not Progressing   Pt refused education, refused to take meds this AM.

## 2023-08-30 NOTE — DISCHARGE PLANNING
Received Choice form @: 0703  Agency/Facility Name: [facility]     Agency/Facility Name: [facility]  Spoke To: Mami  Outcome: DPA called to follow up on referral, per Mami referral is getting denied.     Agency/Facility Name: [facility]  Referral sent per Choice form @: 0193

## 2023-08-30 NOTE — CONSULTS
"PSYCHIATRIC FOLLOW-UP:(established)  *Reason for admission: near - syncope     *Legal Hold Status:  no legal hold        Chart reviewed.         HPI: Patient reports today she has very little energy and has been sleeping throughout the night and this morning.  Patient notes that she has a poor appetite, and has not noticed improvement with medication.  Patient denies pain with swallowing.  Patient reports food has no taste, but does not taste bad.  Patient denies nausea or dental pain. Patient reports her lack of eating is mainly due to to not having energy and if she did have more energy she would be eating more.  Due to lack of energy patient has not been participating in physical activity.  Stated prior to her fall things were going fine at home.  Patient reports her mood would be improved if she could go live in [place].  Patient denied active suicidal thoughts, intent, plan.  Patient does endorse passive suicidal statements such as \"I do not want to live anymore \". She does state if she had active thoughts to harm self she would notify staff or a physician before attempting to harm self. Patient denies homicidal ideation, AVH.    Spoke with attending physician this morning. He states the patient does appear to have low motivation currently. Discharge placement still pending.    Medical ROS (as pertinent):     Review of Systems   Constitutional:  Positive for malaise/fatigue and weight loss.   HENT:          Denies dysphagia   Gastrointestinal:  Negative for constipation, diarrhea and nausea.   Psychiatric/Behavioral:  Positive for depression.            Psychiatric Examination:  Vitals:   Vitals:    08/30/23 0400   BP: 134/60   Pulse: 63   Resp: 16   Temp: 35.9 °C (96.6 °F)   SpO2:       Appearance: appears stated age, fair grooming and hygiene, drowsy, cooperative, eyes closed during interview  Abnormal movements: none  Gait/posture: normal  Speech: low volume, tired tone and normal rhythm, slow " "rate  Though process: linear and goal oriented  Associations: no loose associations  Thought content: not observed responding to internal stimuli, denies paranoia, delusional content not present during interview  Judgement and Insight: limited/limited  Orientation:oriented to person, place, time and situation (just off on specific date by a few days)  Recent and Remote Memory: grossly intact  Attention Span and Concentration: grossly intact  Language: English, fluent  Fund of Knowledge: not tested   Mood and Affect: \"tired\", somnolent, congruent  SI/HI: passive SI without intent or plan, denies HI        Labs personally reviewed, no new labs since 8.24.23    Assessment: Patient continues to have poor appetite and decreased energy.  Patient does not appear to have dysphagia, dental pain or other physical barriers to eating.  Lack of eating and motivation seem to be tied to depression and anhedonia.  We will continue Remeron as it was just increased yesterday for mood and appetite stimulation.  In the future we will consider a stimulant for increased energy in an attempt to boost physical activity and appetite.  Patient endorsing passive suicidal ideation, denying active suicidal ideation, intent or plan.  Legal hold will not be initiated as patient does not meet criteria.      Dx:  #Grief, prolonged  #Major depressive disorder, severe - current  R/o Depression due to medical condition      Plan:  - Legal hold: no hold, patient does not meet criteria at this time  - Psychotropic medications   - Continue Remeron 15mg PO qhs for depression and poor appetite, may consider trial of stimulant this week if no improvement noted from remeron  - Labs reviewed  - EKG reviewed  - Old records reviewed  - Collateral obtained - last spoke with granddaughterLu on 8/29/23  - Safety plan completed, copy in chart  - Discussed the case with: Dr. Miramontes and voalte message sent to Dr. Barrera  - Psychiatry will follow up    Thank you " for the consult.     This note was created using voice recognition software (Dragon). The accuracy of the dictation is limited by the abilities of the software. I have reviewed the note prior to signing. However, error related to voice recognition software and /or scribes may still exist and should be interpreted within the appropriate context.      I personally performed the service, or was physically present during the key or critical portions of the service when performed by a resident; participated in the management of the patient. I have reviewed Dr Alfred's note and agree with the documented finding and plan of care.     Patient continues to endorse depressed mood and anhedonia. She does identify anhedonia as a cause for poor PO intake. Does not endorse pain or nausea today. There is evidence behind the use of mirtazepine as an appetite stimulant in patients with comorbid depression. There is also evidence for use of stimulants if severe depression is driving decreased appetite so will consider this as well.     JACY Miramontes

## 2023-08-30 NOTE — PROGRESS NOTES
Hospital Medicine Daily Progress Note    Date of Service  8/29/2023    Chief Complaint  Felipa Chavez is a 82 y.o. female with CAD, hypothyroid, HTN, and Afib admitted 8/15/2023 with near-syncope and collapse.    Hospital Course  No notes on file    Interval Problem Update    8/28: NESSA ON  She is having increased nausea today. Appetite is slightly improved. Multiple BM overnight and today.  POC discussed with Psychiatrist Dr. Miramontes, who advised increase in mirtazapine and hold celexa for now due to nausea. She will follow up in clinic.  POC discussed with RNCM Judy and GIOVANA Lambert. VARGAS approved for 6 months at . Unable to complete paperwork and set up transport this evening.  Advised Mrs. Chavez and Airam that HH services are intermittent and  staff will provide 24/7 care.  Advised Mrs. Chavez that rehabilitation with HHPT/OT will end if she declines to work with them. It is important that she participate to functionally improve and return to [place].    Above per previous hospitalist    08/29/23  Discussed with case management.  Still not accepted to group home.  On room air today.  Patient needs nocturnal oxygen.  She is not cooperating with PT.  She has been refusing to eat.  Palliative care consult placed to discuss possible hospice.  Discussed with psychiatry to increase mirtazapine.      I have discussed this patient's plan of care and discharge plan at IDT rounds today with Case Management, Nursing, Nursing leadership, and other members of the IDT team.    Consultants/Specialty  psychiatry and physiatry    Code Status  DNAR/DNI    Disposition  The patient is not medically cleared for discharge to home or a post-acute facility.  Anticipate discharge to: home with organized home healthcare and close outpatient follow-up    I have placed the appropriate orders for post-discharge needs.    Review of Systems  Review of Systems   Constitutional:  Negative for chills and fever.   Respiratory:  Negative for cough and  shortness of breath.    Cardiovascular:  Negative for chest pain and palpitations.   Gastrointestinal:  Negative for abdominal pain, nausea and vomiting.   Genitourinary:  Negative for dysuria and hematuria.   Musculoskeletal:  Negative for joint pain and myalgias.   Neurological:  Negative for dizziness and headaches.        Physical Exam  Temp:  [36.5 °C (97.7 °F)-37.1 °C (98.8 °F)] 36.9 °C (98.4 °F)  Pulse:  [54-75] 74  Resp:  [16-18] 16  BP: (126-150)/(54-69) 126/59  SpO2:  [90 %-98 %] 91 %    Physical Exam  Vitals and nursing note reviewed.   Constitutional:       General: She is sleeping.      Appearance: Normal appearance. She is cachectic. She is not ill-appearing or diaphoretic.   HENT:      Head: Normocephalic and atraumatic.      Mouth/Throat:      Mouth: Mucous membranes are moist.      Pharynx: Oropharynx is clear. No oropharyngeal exudate.   Eyes:      General:         Right eye: No discharge.         Left eye: No discharge.      Conjunctiva/sclera: Conjunctivae normal.      Pupils: Pupils are equal, round, and reactive to light.   Cardiovascular:      Rate and Rhythm: Normal rate and regular rhythm.      Pulses: Normal pulses.      Heart sounds: Normal heart sounds. No murmur heard.  Pulmonary:      Effort: Pulmonary effort is normal. No respiratory distress.      Breath sounds: Normal breath sounds.   Abdominal:      General: Abdomen is flat. Bowel sounds are normal. There is no distension.      Palpations: Abdomen is soft.      Tenderness: There is no abdominal tenderness.   Musculoskeletal:      Cervical back: Neck supple. No tenderness.      Right lower leg: No edema.      Left lower leg: No edema.   Neurological:      Mental Status: She is oriented to person, place, and time and easily aroused.      Motor: No weakness.   Psychiatric:         Attention and Perception: Attention normal.         Mood and Affect: Affect is labile and blunt.         Speech: Speech is delayed.         Behavior:  Behavior is cooperative.         Fluids    Intake/Output Summary (Last 24 hours) at 8/29/2023 1916  Last data filed at 8/29/2023 1217  Gross per 24 hour   Intake 240 ml   Output 0 ml   Net 240 ml         Laboratory                            Imaging  [id number] VIEW   Final Result      No acute process. Large amount of stool.      EC-ECHOCARDIOGRAM COMPLETE W/O CONT   Final Result      DX-CHEST-PORTABLE (1 VIEW)   Final Result      No acute cardiopulmonary abnormality.      CT-CSPINE WITHOUT PLUS RECONS   Final Result      No acute fracture or dislocation of the cervical spine.      Multilevel disc and facet degeneration.      CT-HEAD W/O   Final Result      1.  Chronic microvascular ischemic type changes.   2.  No acute intracranial abnormality.   3.  Right sphenoid sinusitis                Assessment/Plan  * Vasovagal near-syncope- (present on admission)  Assessment & Plan  Per patient report and security footage at bank she did not lose consciousness  Recurrent near-syncope events precipitated by nausea  Likely vasovagal due to chronic constipation and nausea  CTH negative for acute process  TTE without significant valvular disease  EKG and telemetry NSR without Afib RVR    Discharge planning issues- (present on admission)  Assessment & Plan  Discussed with complex discharge planning committee.  Patient has too much assets to qualify for group home waiver.  She has not been cooperating with physical therapy.  Refusing to eat.    Patient is trying to get accepted to a group home with home health.    Hyponatremia  Assessment & Plan  Mild  Likely due to inadequate PO intake  Repeat BMP only if clinical change    Slow transit constipation- (present on admission)  Assessment & Plan  Likely polyfactorial including chronic idiopathic constipation, poor PO intake, and decreased mobility with possible neurogenic bowel  Reportedly evaluated by colonoscopy previously  Exacerbated by overflow fecal incontinence  Scheduled  miralax with PRN doses for catharsis  PRN bisacodyl for refractory constipation    Failure to thrive in adult- (present on admission)  Assessment & Plan  Weakness and severe protein-calorie malnutrition due to severe MDD  TSH and B12 WNL  PT/OT/SLP evaluations, recommend post-acute placement but not possible on OBS status  Physiatry consulted, not a candidate for IPR  RNCM assisting with GH coordination, TB Quant negative  HH RN/SW/PT/OT/RD/SLP ordered    Severe protein-calorie malnutrition (HCC)- (present on admission)  Assessment & Plan  ~25% weight loss (30 lbs) preceding hospitalization  Bitemporal wasting  RD consult, agreeable to Boost supplements  Unrestricted diet, encouraged to request and have family deliver desirable food    KIYA (acute kidney injury) (HCC)- (present on admission)  Assessment & Plan  Resolved with IVF  Likely pre-renal from inadequate PO intake  Encouraging PO hydration    Current 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 Miramontes scheduled    08/29/23  Discussed with psychiatry.  Increasing mirtazapine.  Discontinue citalopram.    Acute cystitis without hematuria  Assessment & Plan  Symptomatic by dysuria 8/23  UA +Pyuria  UCx grew ESBL E coli  Macrobid for 5-day course per pharmacy  Likely colonized from hospitalization, did not meet sepsis criteria nor worsen with ceftriaxone which was resistant    Paroxysmal atrial fibrillation (HCC)- (present on admission)  Assessment & Plan  Continue metoprolol and pradaxa    Chronic nausea- (present on admission)  Assessment & Plan  Uncertain etiology, likely component of slow transit constipation /  gastroparesis  Per patient has been evaluated with EGD and Colonoscopy  KUB demonstrates heavy stool burden  Lactulose transitioned to miralax TID for catharsis from constipation  Zofran PRN with compazine PRN second-line    JEREMIAH (obstructive sleep apnea)- (present on admission)  Assessment & Plan  Unable to tolerate CPAP    Prediabetes- (present on admission)  Assessment & Plan  A1c 5.9  Weight loss not indicated due to severe protein-calorie malnutrition  Repeat A1c in 3 months with PCP    Acquired hypothyroidism- (present on admission)  Assessment & Plan  TSH WNL  Continue levothyroxine    Gastroesophageal reflux disease with esophagitis without hemorrhage- (present on admission)  Assessment & Plan  Continue omeprazole    Primary hypertension- (present on admission)  Assessment & Plan  Continue metoprolol and amlodipine       VTE prophylaxis:    therapeutic anticoagulation with pradaxa 150 mg BID    I have performed a physical exam and reviewed and updated ROS and Plan today (8/29/2023). In review of yesterday's note (8/28/2023), there are no changes except as documented above.

## 2023-08-30 NOTE — CONSULTS
MRN: [id number]  Date of initial palliative care consult: order 2023, seen 2023  Reason for palliative medicine consultation/visit: Advance care planning  Referring provider: Dr. Barrera  Location of consult: [place] [address]  Additional consulting services: PM&R and psychiatry    HPI:   Felipa Chavez is a 82 y.o. female with past medical history significant for hypothyroidism, hypertension, CAD, JEREMIAH with intermittent use of CPAP, and depression admitted 8/15/2023 after having an episode of syncope and collapse associated with nausea the bank.  She has been followed by psychiatry for her severe depression.    Symptoms: Denies pain.  Patient is frail and weak.  She has intermittent dyspnea.  She has persistent nausea without vomiting and anorexia/poor appetite.  She reports nausea has improved since initiation of mirtazapine but she still does not have an appetite.    Medication Allergy/Sensitivities:  Allergies   Allergen Reactions    Codeine     Pcn [Penicillins]      As a child     ROS:    Review of Systems   Constitutional:  Positive for malaise/fatigue and weight loss.   Respiratory:  Positive for shortness of breath (intermittent).    Neurological:  Positive for weakness.     PE:   Recent vital signs  BMI: Body mass index is 18.2 kg/m².    Temp (24hrs), Av.6 °C (97.9 °F), Min:35.9 °C (96.6 °F), Max:37 °C (98.6 °F)  Temperature: 35.9 °C (96.6 °F), Monitored Temp: 37 °C (98.6 °F)  Pulse  Av.6  Min: 46  Max: 97   Blood Pressure : 134/60       Physical Exam  Constitutional:       Appearance: She is cachectic.   Pulmonary:      Effort: No respiratory distress.   Skin:     Coloration: Skin is pale.   Neurological:      Mental Status: She is oriented to person, place, and time.       ASSESSMENT/PLAN WITH SHARED DECISION MAKING:   Medications reviewed. Labs Reviewed.     Pertinent imaging reviewed.    PHYSICAL ASPECTS OF CARE  Palliative Performance Scale: 40%    #Syncopal collapse, recurrent  #Prolonged grief due to  "loss of  and home  #Failure to thrive in an adult  #Severe major depressive disorder - psychiatry following and recommendations appreciated, mirtazapine increased   #Nausea and anorexia  #Slow transit constipation  #Severe protein calorie malnutrition  #Acute cystitis without hematuria, treated with Macrobid x5 days to ESBL, E. coli urine culture  #CAD  #Atrial fibrillation  #Hypothyroidism  #Hypertension  #JEREMIAH does not tolerate CPAP  #GERD    SOCIAL ASPECTS OF CARE  Patient is  and lost her  dementia in 2022.  They previously lived in [facility] but relocated to [facility] to be closer to patient's sister.  Unfortunately the patient and her sister had a fall out.  Patient and her  purchased an RV/motorhome that they lived in for a while however RV/motorhome now and [place].  Patient has been living with different relatives since her 's death and currently lives with her granddaughter who recently moved to [place] from [place] about 2 months ago.  Prior to admission patient responsible for cooking her own meals, grocery shopping, and driving.  She does not eat much when food is not in the home and has lost 30-40 pounds over the last 9 months.  She uses food stamps.  She does not get any assistance from Senior services through [facility] stating \"I have not lived in [place] long enough.\"    SPIRITUAL ASPECTS OF CARE   [employer].  Patient reports the missionaries have come to see her once when she appreciated.  Offered additional spiritual care visits which patient would appreciate.     GOALS OF CARE/SERIOUS ILLNESS CONVERSATION  Patient laying in bed, no family at bedside.  Introduced myself and discussed role of palliative care/reason for consult.  Patient confirms she has had significant physical and emotional decline over the last several years.  Her   last November.  They relocated from [place] to [place] to be close to her 3 sisters however the " "sister she was living with and her had a very serious falling out.  Patient has a lack of financial and social resources.  She ultimately feels very weak and debilitated stating \"I am just getting worse.\"  She reports her granddaughter is unable to care for her and she has no other resources.  She is interested in hospice care though she does not know much about hospice care.    Provided education and overview of hospice care benefit.  Discussed barrier to hospice benefit may be lack of terminal progressive diagnosis however patient does have multiple medical comorbidities, significant frailty, and severe protein calorie malnutrition with greater than 10 pound weight loss in the last 6 months.  Confirmed we can place a referral so she can speak with an agency to discuss hospice further and see if they have additional resources to help patient.  She would like to move forward with hospice referral.  She is not sure of what agency she would like to speak with.  Confirmed I can have care coordination follow-up.    POLST form behind bed completed by Dr. Fuentes and patient on 8/23.  I asked patient her wishes in regards to artificial nutrition and she confirms she would not want any feeding tube.  Lalo to this box and obtain copy of form to scanned into EMR.  Patient denied having other questions or needs. Outcome: Hospice referral placed, POLST form completed and obtained copy for scanning into EMR.  Updated RN CM and requested hospice choice.  Patient okay with selective treatment while in the hospital including antibiotics, fluids, and therapies.    Code Status: DNR/DNI    ACP Documents: None on file; POLST form was behind bed.  Completed section C and obtained copy to scanned into EMR.  Replaced form above patient's bed    16 minutes spent discussing advance care planning, this time excludes any other billed services.    I spent a total of 60 minutes reviewing medical records, direct face-to-face time with the patient " "and/or family, documentation and coordination of care. This is separate from the time spent on advance care planning, which is documented above.    Sarah \"Saima\" BRENDA Dixon, [facility]  Palliative Care Nurse Practitioner  [phone]      "

## 2023-08-31 PROBLEM — Z71.89 ADVANCE CARE PLANNING: Status: ACTIVE | Noted: 2023-08-31

## 2023-08-31 LAB
ANION GAP SERPL CALC-SCNC: 11 MMOL/L (ref 7–16)
BUN SERPL-MCNC: 20 MG/DL (ref 8–22)
CALCIUM SERPL-MCNC: 9.2 MG/DL (ref 8.5–10.5)
CHLORIDE SERPL-SCNC: 103 MMOL/L (ref 96–112)
CO2 SERPL-SCNC: 22 MMOL/L (ref 20–33)
CREAT SERPL-MCNC: 0.86 MG/DL (ref 0.5–1.4)
ERYTHROCYTE [DISTWIDTH] IN BLOOD BY AUTOMATED COUNT: 47.8 FL (ref 35.9–50)
GFR SERPLBLD CREATININE-BSD FMLA CKD-EPI: 66 ML/MIN/1.73 M 2
GLUCOSE SERPL-MCNC: 99 MG/DL (ref 65–99)
HCT VFR BLD AUTO: 38.3 % (ref 37–47)
HGB BLD-MCNC: 12.4 G/DL (ref 12–16)
MCH RBC QN AUTO: 31.3 PG (ref 27–33)
MCHC RBC AUTO-ENTMCNC: 32.4 G/DL (ref 32.2–35.5)
MCV RBC AUTO: 96.7 FL (ref 81.4–97.8)
PLATELET # BLD AUTO: 279 K/UL (ref 164–446)
PMV BLD AUTO: 9.5 FL (ref 9–12.9)
POTASSIUM SERPL-SCNC: 4.6 MMOL/L (ref 3.6–5.5)
RBC # BLD AUTO: 3.96 M/UL (ref 4.2–5.4)
SODIUM SERPL-SCNC: 136 MMOL/L (ref 135–145)
WBC # BLD AUTO: 5.4 K/UL (ref 4.8–10.8)

## 2023-08-31 PROCEDURE — 36415 COLL VENOUS BLD VENIPUNCTURE: CPT

## 2023-08-31 PROCEDURE — 99232 SBSQ HOSP IP/OBS MODERATE 35: CPT | Performed by: STUDENT IN AN ORGANIZED HEALTH CARE EDUCATION/TRAINING PROGRAM

## 2023-08-31 PROCEDURE — A9270 NON-COVERED ITEM OR SERVICE: HCPCS | Performed by: STUDENT IN AN ORGANIZED HEALTH CARE EDUCATION/TRAINING PROGRAM

## 2023-08-31 PROCEDURE — 85027 COMPLETE CBC AUTOMATED: CPT

## 2023-08-31 PROCEDURE — 700102 HCHG RX REV CODE 250 W/ 637 OVERRIDE(OP): Performed by: NURSE PRACTITIONER

## 2023-08-31 PROCEDURE — A9270 NON-COVERED ITEM OR SERVICE: HCPCS | Performed by: NURSE PRACTITIONER

## 2023-08-31 PROCEDURE — 80048 BASIC METABOLIC PNL TOTAL CA: CPT

## 2023-08-31 PROCEDURE — 700102 HCHG RX REV CODE 250 W/ 637 OVERRIDE(OP): Performed by: STUDENT IN AN ORGANIZED HEALTH CARE EDUCATION/TRAINING PROGRAM

## 2023-08-31 PROCEDURE — A9270 NON-COVERED ITEM OR SERVICE: HCPCS | Performed by: INTERNAL MEDICINE

## 2023-08-31 PROCEDURE — G0378 HOSPITAL OBSERVATION PER HR: HCPCS

## 2023-08-31 PROCEDURE — 99497 ADVNCD CARE PLAN 30 MIN: CPT | Performed by: STUDENT IN AN ORGANIZED HEALTH CARE EDUCATION/TRAINING PROGRAM

## 2023-08-31 PROCEDURE — 700102 HCHG RX REV CODE 250 W/ 637 OVERRIDE(OP): Performed by: INTERNAL MEDICINE

## 2023-08-31 PROCEDURE — 99231 SBSQ HOSP IP/OBS SF/LOW 25: CPT | Mod: GC | Performed by: STUDENT IN AN ORGANIZED HEALTH CARE EDUCATION/TRAINING PROGRAM

## 2023-08-31 PROCEDURE — 700111 HCHG RX REV CODE 636 W/ 250 OVERRIDE (IP): Performed by: NURSE PRACTITIONER

## 2023-08-31 RX ORDER — METHYLPHENIDATE HYDROCHLORIDE 5 MG/1
5 TABLET ORAL
Status: DISCONTINUED | OUTPATIENT
Start: 2023-08-31 | End: 2023-09-02 | Stop reason: HOSPADM

## 2023-08-31 RX ADMIN — LEVOTHYROXINE SODIUM 100 MCG: 0.1 TABLET ORAL at 11:09

## 2023-08-31 RX ADMIN — ACETAMINOPHEN 1000 MG: 500 TABLET, FILM COATED ORAL at 00:25

## 2023-08-31 RX ADMIN — MIRTAZAPINE 15 MG: 15 TABLET, FILM COATED ORAL at 20:19

## 2023-08-31 RX ADMIN — SENNOSIDES AND DOCUSATE SODIUM 2 TABLET: 50; 8.6 TABLET ORAL at 20:19

## 2023-08-31 RX ADMIN — SENNOSIDES AND DOCUSATE SODIUM 2 TABLET: 50; 8.6 TABLET ORAL at 11:09

## 2023-08-31 RX ADMIN — ACETAMINOPHEN 1000 MG: 500 TABLET, FILM COATED ORAL at 13:47

## 2023-08-31 RX ADMIN — OMEPRAZOLE 20 MG: 20 CAPSULE, DELAYED RELEASE ORAL at 11:09

## 2023-08-31 RX ADMIN — ONDANSETRON 4 MG: 4 TABLET, ORALLY DISINTEGRATING ORAL at 04:56

## 2023-08-31 RX ADMIN — DABIGATRAN ETEXILATE MESYLATE 75 MG: 75 CAPSULE ORAL at 11:09

## 2023-08-31 RX ADMIN — METOPROLOL TARTRATE 25 MG: 25 TABLET, FILM COATED ORAL at 11:09

## 2023-08-31 RX ADMIN — OMEPRAZOLE 20 MG: 20 CAPSULE, DELAYED RELEASE ORAL at 20:19

## 2023-08-31 RX ADMIN — NITROFURANTOIN MONOHYDRATE/MACROCRYSTALLINE 100 MG: 25; 75 CAPSULE ORAL at 11:09

## 2023-08-31 RX ADMIN — DABIGATRAN ETEXILATE MESYLATE 75 MG: 75 CAPSULE ORAL at 20:19

## 2023-08-31 RX ADMIN — METOPROLOL TARTRATE 25 MG: 25 TABLET, FILM COATED ORAL at 20:19

## 2023-08-31 RX ADMIN — Medication 1000 UNITS: at 11:09

## 2023-08-31 RX ADMIN — AMLODIPINE BESYLATE 10 MG: 10 TABLET ORAL at 11:09

## 2023-08-31 ASSESSMENT — ENCOUNTER SYMPTOMS
NAUSEA: 1
SHORTNESS OF BREATH: 0
ABDOMINAL PAIN: 0
DEPRESSION: 1
CHILLS: 0
HALLUCINATIONS: 0
SHORTNESS OF BREATH: 0
FEVER: 0
CONSTIPATION: 0
DIZZINESS: 0
COUGH: 0
HEADACHES: 0
NERVOUS/ANXIOUS: 0
NAUSEA: 0
MYALGIAS: 1
INSOMNIA: 0
VOMITING: 0
VOMITING: 0
WEAKNESS: 1
HEADACHES: 0
MYALGIAS: 0
ABDOMINAL PAIN: 0
PALPITATIONS: 0
DIZZINESS: 0
PALPITATIONS: 0
WEIGHT LOSS: 1
DIARRHEA: 0

## 2023-08-31 NOTE — DISCHARGE PLANNING
Teams messaged FC on 8/29 to request mcaid screening.I don't see any notes to indicate that it was done. Called FC today [phone] and Left VM message with that request again.   Spoke to Nereida from [facility], she will be in to see pt today. She has some GH suggestions.

## 2023-08-31 NOTE — FACE TO FACE
Face to Face Note  -  Durable Medical Equipment    Cruz Barrera M.D. - NPI: [id number]  I certify that this patient is under my care and that they had a durable medical equipment(DME)face to face encounter by myself that meets the physician DME face-to-face encounter requirements with this patient on:    Date of encounter:   Patient:                    MRN:                       YOB: 2025  Felipa Gilr  [id number]  1940     The encounter with the patient was in whole, or in part, for the following medical condition, which is the primary reason for durable medical equipment:  Other - Failure to thrive in adult    I certify that, based on my findings, the following durable medical equipment is medically necessary:    3 in 1 Bedside [place].    My Clinical findings support the need for the above equipment due to:  Abnormal Gait, Frequent Falls

## 2023-08-31 NOTE — FACE TO FACE
Face to Face Note  -  Durable Medical Equipment    Cruz Barrera M.D. - NPI: [id number]  I certify that this patient is under my care and that they had a durable medical equipment(DME)face to face encounter by myself that meets the physician DME face-to-face encounter requirements with this patient on:    Date of encounter:   Patient:                    MRN:                       YOB: 2025  Felipa Chavez  [id number]  1940     The encounter with the patient was in whole, or in part, for the following medical condition, which is the primary reason for durable medical equipment:  Other - Failure to thrive in adult    I certify that, based on my findings, the following durable medical equipment is medically necessary:    Wheelchair   Patient needs manual wheelchair for use inside the home based on the above diagnosis. Per guidelines patient meets criteria in the following ways:   A.  Patient has significant impairment in the following Toileting, Feeding, Dressing, Grooming, and Bathing and is is unable to complete these tasks in a reasonable timeframe and places the patient at a heightened risk of morbidity.   B.  The patient's mobility limitations cannot be sufficiently resolved by use of  fitted cane or walker.   C.  The patient reports his home provides adequate access between rooms,  maneuvering space, and surfaces for use of the manual wheelchair that is  provided.   D.  The use of the manual wheelchair will significantly improve the patient's  ability to participate in MRADLs and the patient will use it on a regular basis in  the home.   E.  The patient has not expressed an unwillingness to use the manual  wheelchair.   F. The patient has limitations of strength, endurance, range of motion, or coordination per OT notes:.    My Clinical findings support the need for the above equipment due to:  Abnormal Gait, Frequent Falls

## 2023-08-31 NOTE — DISCHARGE PLANNING
Received call from CHERRIE Dalal. She will message the screeners and ask to have pt screened today.

## 2023-08-31 NOTE — PROGRESS NOTES
Hospital Medicine Daily Progress Note    Date of Service  8/31/2023    Chief Complaint  Felipa Chavez is a 82 y.o. female with CAD, hypothyroid, HTN, and Afib admitted 8/15/2023 with near-syncope and collapse.    Hospital Course  No notes on file    Interval Problem Update    8/28: NESSA ON  She is having increased nausea today. Appetite is slightly improved. Multiple BM overnight and today.  POC discussed with Psychiatrist Dr. Miramontes, who advised increase in mirtazapine and hold celexa for now due to nausea. She will follow up in clinic.  POC discussed with RNNAVI Kim and GIOVANA Lambert. VARGAS approved for 6 months at . Unable to complete paperwork and set up transport this evening.  Advised Mrs. Chavze and Airam that HH services are intermittent and  staff will provide 24/7 care.  Advised Mrs. Chavez that rehabilitation with HHPT/OT will end if she declines to work with them. It is important that she participate to functionally improve and return to [place].    Above per previous hospitalist    08/29/23  Discussed with case management.  Still not accepted to group home.  On room air today.  Patient needs nocturnal oxygen.  She is not cooperating with PT.  She has been refusing to eat.  Palliative care consult placed to discuss possible hospice.  Discussed with psychiatry to increase mirtazapine.    08/30/23  No acute events overnight.  Per palliative care, patient is interested in hospice, referral placed.  Wants selective treatment at this time.  Discussed with psychiatry.  No new changes.  May consider stimulants.  Discussed with Complex Discharge Planning Committee.    08/31/23  Patient states that she does not want hospice.  Discussed with psychiatry recommending starting Ritalin.  Discussed with granddaughter does not want Ritalin at this time but patient is consenting.  Long discussion regarding advance care planning and discharge planning.  DME for commode and wheelchair ordered.  Granddaughter considering taking home for  the time being until the patient can be placed into a group home.      I have discussed this patient's plan of care and discharge plan at IDT rounds today with Case Management, Nursing, Nursing leadership, and other members of the IDT team.    Consultants/Specialty  psychiatry and physiatry    Code Status  DNAR/DNI    Disposition  The patient is medically cleared for discharge to home or a post-acute facility.  Anticipate discharge to: home with organized home healthcare and close outpatient follow-up    I have placed the appropriate orders for post-discharge needs.    Review of Systems  Review of Systems   Constitutional:  Positive for malaise/fatigue. Negative for chills and fever.   Respiratory:  Negative for cough and shortness of breath.    Cardiovascular:  Negative for chest pain and palpitations.   Gastrointestinal:  Negative for abdominal pain, nausea and vomiting.   Genitourinary:  Negative for dysuria and hematuria.   Musculoskeletal:  Negative for joint pain and myalgias.   Neurological:  Negative for dizziness and headaches.        Physical Exam  Temp:  [36.3 °C (97.3 °F)-37.1 °C (98.8 °F)] 37 °C (98.6 °F)  Pulse:  [64-70] 70  Resp:  [16-17] 17  BP: (122-165)/(56-74) 143/74  SpO2:  [90 %-95 %] 90 %    Physical Exam  Vitals and nursing note reviewed.   Constitutional:       General: She is sleeping.      Appearance: She is cachectic. She is not ill-appearing or diaphoretic.   HENT:      Head: Normocephalic and atraumatic.      Mouth/Throat:      Mouth: Mucous membranes are moist.      Pharynx: Oropharynx is clear. No oropharyngeal exudate.   Eyes:      General:         Right eye: No discharge.         Left eye: No discharge.      Conjunctiva/sclera: Conjunctivae normal.      Pupils: Pupils are equal, round, and reactive to light.   Cardiovascular:      Rate and Rhythm: Normal rate and regular rhythm.      Pulses: Normal pulses.      Heart sounds: Normal heart sounds. No murmur heard.  Pulmonary:       Effort: Pulmonary effort is normal. No respiratory distress.      Breath sounds: Normal breath sounds.   Abdominal:      General: Abdomen is flat. Bowel sounds are normal. There is no distension.      Palpations: Abdomen is soft.      Tenderness: There is no abdominal tenderness.   Musculoskeletal:      Cervical back: Neck supple. No tenderness.      Right lower leg: No edema.      Left lower leg: No edema.   Neurological:      Mental Status: She is oriented to person, place, and time and easily aroused.      Motor: No weakness.   Psychiatric:         Attention and Perception: Attention normal.         Mood and Affect: Affect is labile and blunt.         Speech: Speech is delayed.         Behavior: Behavior is cooperative.         Fluids    Intake/Output Summary (Last 24 hours) at 8/31/2023 1425  Last data filed at 8/31/2023 1100  Gross per 24 hour   Intake 680 ml   Output no documentation   Net 680 ml         Laboratory  Recent Labs     08/31/23  1005   WBC 5.4   RBC 3.96*   HEMOGLOBIN 12.4   HEMATOCRIT 38.3   MCV 96.7   MCH 31.3   MCHC 32.4   RDW 47.8   PLATELETCT 279   MPV 9.5       Recent Labs     08/31/23  1005   SODIUM 136   POTASSIUM 4.6   CHLORIDE 103   CO2 22   GLUCOSE 99   BUN 20   CREATININE 0.86   CALCIUM 9.2                     Imaging  [id number] VIEW   Final Result      No acute process. Large amount of stool.      EC-ECHOCARDIOGRAM COMPLETE W/O CONT   Final Result      DX-CHEST-PORTABLE (1 VIEW)   Final Result      No acute cardiopulmonary abnormality.      CT-CSPINE WITHOUT PLUS RECONS   Final Result      No acute fracture or dislocation of the cervical spine.      Multilevel disc and facet degeneration.      CT-HEAD W/O   Final Result      1.  Chronic microvascular ischemic type changes.   2.  No acute intracranial abnormality.   3.  Right sphenoid sinusitis                Assessment/Plan  * Vasovagal near-syncope- (present on admission)  Assessment & Plan  Per patient report and security footage  at bank she did not lose consciousness  Recurrent near-syncope events precipitated by nausea  Likely vasovagal due to chronic constipation and nausea  CTH negative for acute process  TTE without significant valvular disease  EKG and telemetry NSR without Afib RVR    Discharge planning issues- (present on admission)  Assessment & Plan  Discussed with complex discharge planning committee.  Patient has too much assets to qualify for group home waiver.  She has not been cooperating with physical therapy.  Refusing to eat.    Patient is trying to get accepted to a group home with home health.    Hyponatremia  Assessment & Plan  Mild  Likely due to inadequate PO intake  Repeat BMP only if clinical change    Slow transit constipation- (present on admission)  Assessment & Plan  Likely polyfactorial including chronic idiopathic constipation, poor PO intake, and decreased mobility with possible neurogenic bowel  Reportedly evaluated by colonoscopy previously  Exacerbated by overflow fecal incontinence  Scheduled miralax with PRN doses for catharsis  PRN bisacodyl for refractory constipation    Failure to thrive in adult- (present on admission)  Assessment & Plan  Weakness and severe protein-calorie malnutrition due to severe MDD  TSH and B12 WNL  PT/OT/SLP evaluations, recommend post-acute placement but not possible on OBS status  Physiatry consulted, not a candidate for IPR  RNCM assisting with GH coordination, TB Quant negative  HH RN/SW/PT/OT/RD/SLP ordered    Severe protein-calorie malnutrition (HCC)- (present on admission)  Assessment & Plan  ~25% weight loss (30 lbs) preceding hospitalization  Bitemporal wasting  RD consult, agreeable to Boost supplements  Unrestricted diet, encouraged to request and have family deliver desirable food    KIYA (acute kidney injury) (HCC)- (present on admission)  Assessment & Plan  Resolved with IVF  Likely pre-renal from inadequate PO intake  Encouraging PO hydration    Current severe  episode of 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 without prior episode (HCC)- (present on admission)  Assessment & Plan  Due to grief of of loss of  and home  Psychiatry consulted and recommended mirtazapine with psychotherapy  Declined psychology consult  Citalopram discontinued and increased mirtazapine per psychiatry recommendation  Outpatient follow up with Dr. Miramontes scheduled    08/29/23  Discussed with psychiatry.  Increasing mirtazapine.  Discontinue citalopram.    08/31/23  Discussed with psychiatry.  Continuing mirtazapine.  Recommending starting Ritalin.  Ordered for tomorrow.    Acute cystitis without hematuria  Assessment & Plan  Symptomatic by dysuria 8/23  UA +Pyuria  UCx grew ESBL E coli  Macrobid for 5-day course per pharmacy  Likely colonized from hospitalization, did not meet sepsis criteria nor worsen with ceftriaxone which was resistant    Paroxysmal atrial fibrillation (HCC)- (present on admission)  Assessment & Plan  Continue metoprolol and pradaxa    Chronic nausea- (present on admission)  Assessment & Plan  Uncertain etiology, likely component of slow transit constipation / gastroparesis  Per patient has been evaluated with EGD and Colonoscopy  KUB demonstrates heavy stool burden  Lactulose transitioned to miralax TID for catharsis from constipation  Zofran PRN with compazine PRN second-line    JEREMIAH (obstructive sleep apnea)- (present on admission)  Assessment & Plan  Unable to tolerate CPAP    Prediabetes- (present on admission)  Assessment & Plan  A1c 5.9  Weight loss not indicated due to severe protein-calorie malnutrition  Repeat A1c in 3 months with PCP    Acquired hypothyroidism- (present on admission)  Assessment & Plan  TSH WNL  Continue levothyroxine    Gastroesophageal reflux disease with esophagitis without hemorrhage- (present on admission)  Assessment & Plan  Continue omeprazole    Primary hypertension- (present on admission)  Assessment & Plan  Continue  metoprolol and amlodipine    Advance care planning- (present on admission)  Assessment & Plan  I answered further questions regarding patient's medical condition with the patient's granddaughter.  I discussed my concerns that the patient was nearing the end of her life exhibiting decrease in eating as well as poor engagement with social environments.   She talked about maybe transferring her to [place] where she can get assistance from [facility].  However, she was not certain whether patient would be able to handle the transport.  I encouraged her to think very carefully about the endpoint in regards to the patient as I did not feel that the patient would make a meaningful recovery to the point that she could function at her previous baseline.  I informed her that her recent weight is 90 pounds, which she noted was significantly decreased from 120 pounds.  I informed her these are all signs of severe protein calorie malnutrition and signs that the patient was nearing the end of her life.  She stated that she was considering taking her to her home for the time being.  She expressed concerns about being home by herself to take care of the grandmother.  She stated that she recently had to take care of another family member with hospice which was difficult at home.  I explained the recommendations of the complex discharge committee, recommended selling off patient's assets to pay for her group home until she would qualify for a group home waiver.  She stated that the patient really did not have any assess and that her motor home was under her son's name.  We will continue to have further discussions in regards to her difficult discharge planning.    Advance care planning total time 22 minutes    DNR (do not resuscitate)- (present on admission)  Assessment & Plan  Per discussion. DNR/DNI.  POLST completed by Palliative care.  Appreciate assistance.    Palliative care encounter- (present on  admission)  Assessment & Plan  Per discussion palliative care, patient is interested in hospice.  Referral placed.  Selective treatment during hospitalization.       VTE prophylaxis:    therapeutic anticoagulation with pradaxa 150 mg BID    I have performed a physical exam and reviewed and updated ROS and Plan today (8/31/2023). In review of yesterday's note (8/30/2023), there are no changes except as documented above.

## 2023-08-31 NOTE — DISCHARGE PLANNING
Case Management Discharge Planning    Admission Date: 8/15/2023  GMLOS:    ALOS: 0    6-Clicks ADL Score: 18  6-Clicks Mobility Score: 6  PT and/or OT Eval ordered: Yes  Post-acute Referrals Ordered: Yes  Post-acute Choice Obtained: Yes  Has referral(s) been sent to post-acute provider:  Yes      Anticipated Discharge Dispo: Discharge Disposition: Discharged to home/self care (01)    DME Needed: No    Action(s) Taken: Updated Provider/Nurse on Discharge Plan    Escalations Completed: Pending Discharge Destination    Medically Clear: Yes    Next Steps: Nereida from [facility] visited pt and she declined hospice. She said she didn't understand what hospice was and does not want end of life care at this time. She plans to have her friend from [facility] take her to another friend, Megan, in [place].She states grand-daughter is going to allow her to go back to grand-daughter's home while she makes arrangements to go to [place]. Grand  daughter, Airam is coming to visit here this afternoon. Pt's appetite improving an she is now able to amb short distances with walker. She wons 4 wheeled walker and has BSC. Will discuss with grand  daughter when she arrives.    Barriers to Discharge: Pending Placement    Is the patient up for discharge tomorrow: No

## 2023-08-31 NOTE — DISCHARGE PLANNING
Met with grand daughter, Airam. She verified that she is planning to take pt back to her home briefly with eventual move to [place] with friend, Megan. Airam requesting hospital bed, W/C and BSC.I explained that insurance will not cover equipment unless medically necessary. Hospital bed and W/C will not be covered. She questioned why she was  promised that these would be provided in earlier conversations with ALLEN Wall. I was unable to answer that but provided information on [facility] and encouraged her to call ASAP. Also provided DME choice for so she can call to arrange for private pay if [facility] unable to provide the requested equipment. Also suggested purchase  through [facility] or retail pharmacy. She also requested update from MD. Will ask Dr. Barrera to call her. I explained that discharge is imminent and did tell her that will will provide transport to home. Pt will need HH. Referral to [facility] in process.

## 2023-08-31 NOTE — ASSESSMENT & PLAN NOTE
Per discussion palliative care, patient is interested in hospice.  Referral placed.  Selective treatment during hospitalization.

## 2023-08-31 NOTE — PROGRESS NOTES
Hospital Medicine Daily Progress Note    Date of Service  8/30/2023    Chief Complaint  Felipa Chavez is a 82 y.o. female with CAD, hypothyroid, HTN, and Afib admitted 8/15/2023 with near-syncope and collapse.    Hospital Course  No notes on file    Interval Problem Update    8/28: NESSA ON  She is having increased nausea today. Appetite is slightly improved. Multiple BM overnight and today.  POC discussed with Psychiatrist Dr. Miramontes, who advised increase in mirtazapine and hold celexa for now due to nausea. She will follow up in clinic.  POC discussed with RNNAVI Kim and GIOVANA Lambert. VARGAS approved for 6 months at . Unable to complete paperwork and set up transport this evening.  Advised Mrs. Chavez and Airam that HH services are intermittent and  staff will provide 24/7 care.  Advised Mrs. Chavez that rehabilitation with HHPT/OT will end if she declines to work with them. It is important that she participate to functionally improve and return to [place].    Above per previous hospitalist    08/29/23  Discussed with case management.  Still not accepted to group home.  On room air today.  Patient needs nocturnal oxygen.  She is not cooperating with PT.  She has been refusing to eat.  Palliative care consult placed to discuss possible hospice.  Discussed with psychiatry to increase mirtazapine.    08/30/23  No acute events overnight.  Per palliative care, patient is interested in hospice, referral placed.  Wants selective treatment at this time.  Discussed with psychiatry.  No new changes.  May consider stimulants.  Discussed with Complex Discharge Planning Committee.    I have discussed this patient's plan of care and discharge plan at IDT rounds today with Case Management, Nursing, Nursing leadership, and other members of the IDT team.    Consultants/Specialty  psychiatry and physiatry    Code Status  DNAR/DNI    Disposition  The patient is not medically cleared for discharge to home or a post-acute facility.  Anticipate  discharge to: hospice    I have placed the appropriate orders for post-discharge needs.    Review of Systems  Review of Systems   Constitutional:  Negative for chills and fever.   Respiratory:  Negative for cough and shortness of breath.    Cardiovascular:  Negative for chest pain and palpitations.   Gastrointestinal:  Negative for abdominal pain, nausea and vomiting.   Genitourinary:  Negative for dysuria and hematuria.   Musculoskeletal:  Negative for joint pain and myalgias.   Neurological:  Negative for dizziness and headaches.        Physical Exam  Temp:  [35.9 °C (96.6 °F)-37.1 °C (98.8 °F)] 36.3 °C (97.3 °F)  Pulse:  [57-65] 65  Resp:  [16] 16  BP: (122-159)/(60-67) 122/60  SpO2:  [90 %-97 %] 95 %    Physical Exam  Vitals and nursing note reviewed.   Constitutional:       General: She is sleeping.      Appearance: Normal appearance. She is cachectic. She is not ill-appearing or diaphoretic.   HENT:      Head: Normocephalic and atraumatic.      Mouth/Throat:      Mouth: Mucous membranes are moist.      Pharynx: Oropharynx is clear. No oropharyngeal exudate.   Eyes:      General:         Right eye: No discharge.         Left eye: No discharge.      Conjunctiva/sclera: Conjunctivae normal.      Pupils: Pupils are equal, round, and reactive to light.   Cardiovascular:      Rate and Rhythm: Normal rate and regular rhythm.      Pulses: Normal pulses.      Heart sounds: Normal heart sounds. No murmur heard.  Pulmonary:      Effort: Pulmonary effort is normal. No respiratory distress.      Breath sounds: Normal breath sounds.   Abdominal:      General: Abdomen is flat. Bowel sounds are normal. There is no distension.      Palpations: Abdomen is soft.      Tenderness: There is no abdominal tenderness.   Musculoskeletal:      Cervical back: Neck supple. No tenderness.      Right lower leg: No edema.      Left lower leg: No edema.   Neurological:      Mental Status: She is oriented to person, place, and time and easily  aroused.      Motor: No weakness.   Psychiatric:         Attention and Perception: Attention normal.         Mood and Affect: Affect is labile and blunt.         Speech: Speech is delayed.         Behavior: Behavior is cooperative.         Fluids    Intake/Output Summary (Last 24 hours) at 8/30/2023 2125  Last data filed at 8/30/2023 1700  Gross per 24 hour   Intake 660 ml   Output no documentation   Net 660 ml         Laboratory                            Imaging  [id number] VIEW   Final Result      No acute process. Large amount of stool.      EC-ECHOCARDIOGRAM COMPLETE W/O CONT   Final Result      DX-CHEST-PORTABLE (1 VIEW)   Final Result      No acute cardiopulmonary abnormality.      CT-CSPINE WITHOUT PLUS RECONS   Final Result      No acute fracture or dislocation of the cervical spine.      Multilevel disc and facet degeneration.      CT-HEAD W/O   Final Result      1.  Chronic microvascular ischemic type changes.   2.  No acute intracranial abnormality.   3.  Right sphenoid sinusitis                Assessment/Plan  * Vasovagal near-syncope- (present on admission)  Assessment & Plan  Per patient report and security footage at bank she did not lose consciousness  Recurrent near-syncope events precipitated by nausea  Likely vasovagal due to chronic constipation and nausea  CTH negative for acute process  TTE without significant valvular disease  EKG and telemetry NSR without Afib RVR    Discharge planning issues- (present on admission)  Assessment & Plan  Discussed with complex discharge planning committee.  Patient has too much assets to qualify for group home waiver.  She has not been cooperating with physical therapy.  Refusing to eat.    Patient is trying to get accepted to a group home with home health.    Hyponatremia  Assessment & Plan  Mild  Likely due to inadequate PO intake  Repeat BMP only if clinical change    Slow transit constipation- (present on admission)  Assessment & Plan  Likely  polyfactorial including chronic idiopathic constipation, poor PO intake, and decreased mobility with possible neurogenic bowel  Reportedly evaluated by colonoscopy previously  Exacerbated by overflow fecal incontinence  Scheduled miralax with PRN doses for catharsis  PRN bisacodyl for refractory constipation    Failure to thrive in adult- (present on admission)  Assessment & Plan  Weakness and severe protein-calorie malnutrition due to severe MDD  TSH and B12 WNL  PT/OT/SLP evaluations, recommend post-acute placement but not possible on OBS status  Physiatry consulted, not a candidate for IPR  RNCM assisting with GH coordination, TB Quant negative  HH RN/SW/PT/OT/RD/SLP ordered    Severe protein-calorie malnutrition (HCC)- (present on admission)  Assessment & Plan  ~25% weight loss (30 lbs) preceding hospitalization  Bitemporal wasting  RD consult, agreeable to Boost supplements  Unrestricted diet, encouraged to request and have family deliver desirable food    KIYA (acute kidney injury) (HCC)- (present on admission)  Assessment & Plan  Resolved with IVF  Likely pre-renal from inadequate PO intake  Encouraging PO hydration    Current 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 Miramontes scheduled    08/29/23  Discussed with psychiatry.  Increasing mirtazapine.  Discontinue citalopram.    Acute cystitis without hematuria  Assessment & Plan  Symptomatic by dysuria 8/23  UA +Pyuria  UCx grew ESBL E coli  Macrobid for 5-day course per pharmacy  Likely colonized from hospitalization, did not meet sepsis criteria nor worsen with ceftriaxone which was resistant    Paroxysmal  atrial fibrillation (HCC)- (present on admission)  Assessment & Plan  Continue metoprolol and pradaxa    Chronic nausea- (present on admission)  Assessment & Plan  Uncertain etiology, likely component of slow transit constipation / gastroparesis  Per patient has been evaluated with EGD and Colonoscopy  KUB demonstrates heavy stool burden  Lactulose transitioned to miralax TID for catharsis from constipation  Zofran PRN with compazine PRN second-line    JEREMIAH (obstructive sleep apnea)- (present on admission)  Assessment & Plan  Unable to tolerate CPAP    Prediabetes- (present on admission)  Assessment & Plan  A1c 5.9  Weight loss not indicated due to severe protein-calorie malnutrition  Repeat A1c in 3 months with PCP    Acquired hypothyroidism- (present on admission)  Assessment & Plan  TSH WNL  Continue levothyroxine    Gastroesophageal reflux disease with esophagitis without hemorrhage- (present on admission)  Assessment & Plan  Continue omeprazole    Primary hypertension- (present on admission)  Assessment & Plan  Continue metoprolol and amlodipine    DNR (do not resuscitate)- (present on admission)  Assessment & Plan  Per discussion. DNR/DNI.  POLST completed by Palliative care.  Appreciate assistance.    Palliative care encounter- (present on admission)  Assessment & Plan  Per discussion palliative care, patient is interested in hospice.  Referral placed.  Selective treatment during hospitalization.       VTE prophylaxis:    therapeutic anticoagulation with pradaxa 150 mg BID    I have performed a physical exam and reviewed and updated ROS and Plan today (8/30/2023). In review of yesterday's note (8/29/2023), there are no changes except as documented above.

## 2023-08-31 NOTE — CARE PLAN
"Received A&Ox4 flat affect but very pleasant, assisted to the restroom ambulating with x1 assist with walker, complained of pain after controlled by Tylenol PO. Uses call light appropriately, encouraged to verbalized needs and feelings. Able to rest and sleep during the shift.    Upon giving her morning medication she complained of nausea, gave Zofran disintegrating tablet and came back after an hour if she'll be able to take morning meds, Felipa verbalized \"No, I want to take them after breakfast.\" Meds moved to 8am dose.     The patient is Stable - Low risk of patient condition declining or worsening    Shift Goals  Clinical Goals: working on placement  Patient Goals: rest and comfort  Family Goals: updated over the phone    Progress made toward(s) clinical / shift goals:      Problem: Pain - Standard  Goal: Alleviation of pain or a reduction in pain to the patient’s comfort goal  Outcome: Progressing     Problem: Provide Safe Environment  Goal: Suicide environmental safety, protocols, policies, and practices will be implemented  Outcome: Progressing     Problem: Fall Risk  Goal: Patient will remain free from falls  Outcome: Progressing       Patient is not progressing towards the following goals:    Problem: Depression  Goal: Patient and family/caregiver will verbalize accurate information about at least two of the possible causes of depression, three-four of the signs and symptoms of depression  Outcome: Progressing  Verbalized she misses late          "

## 2023-08-31 NOTE — CONSULTS
PSYCHIATRIC FOLLOW-UP:(established)    *Reason for admission: Near syncopal event  *Reason for consult: depression, grief, medication management       *Legal Hold Status: not applicable           Chart reviewed.         HPI:  Overnight, she got sleep but she is somnolent during interview and reports little energy. Patient reports today her mood is better and she is denying SI, intent or plan; she was also able to contract for safety. She has a better appetite, and had just requested her breakfast tray prior to our arrival. She is tolerating her medication and isn't sure if it's helping her appetite or sleep but denies adverse effects. She denies pain with swallowing, abdominal pain, and dental pain. She endorsed some nausea this morning that improved with Zofran and some pain in her legs that improved with tylenol. She is agreeable to starting a medication for energy and mood as this is one of her primary barriers for resuming independence in her ADLs and IADLs. She is Aox4 and denies additional questions or concerns.    Patient met had palliative consult and requested hospice referral; currently pending. Discharge placement still pending.            Medical ROS (as pertinent):     Review of Systems   Constitutional:  Positive for malaise/fatigue and weight loss.   Respiratory:  Negative for shortness of breath.    Cardiovascular:  Negative for chest pain and palpitations.   Gastrointestinal:  Positive for nausea. Negative for abdominal pain, constipation, diarrhea and vomiting.   Genitourinary:  Negative for dysuria, frequency and urgency.   Musculoskeletal:  Positive for myalgias.   Neurological:  Positive for weakness. Negative for dizziness and headaches.   Psychiatric/Behavioral:  Positive for depression and suicidal ideas. Negative for hallucinations. The patient is not nervous/anxious and does not have insomnia.        Psychiatric Examination:  Vitals: There were no vitals filed for this visit.  BP-143/74  "  General appearance: appears stated age, appropriate, casual, and neat grooming and hygiene.   Behavior: Pt is calm and cooperative with interview and drowsy .  No apparent distress.  Eye contact is limited/fleeting. with multiple instances of closed eyes.  Abnormal movements: Psychomotor agitation not noted. Some retardation noted.  Tics or tremors not noted.  Gait/posture: No abnormalities appreciated  Speech:  low volume, tired tone, normal rhythm, slow rate  Thought Process: Logical and Goal-directed  Thought Content:  denies passive  suicidal ideation, intent, and plan, denies homicidal ideation. Within normal limits  Perception: denies auditory hallucinations, denies visual hallucinations. Not responding to internal stimuli. No delusions or paranoia noted on interview.   Judgment and Insight: Limited / Limited  Orientation: Alert and Fully Oriented  Recent and remote memory: No gross evidence of memory deficits  Attention span and concentration: intact  Language: English, fluent  Fund of Knowledge: appropriate for age and education  Mood: \"better\"  Affect: Congruent with content and somnolent  SI/HI: Denies passive and active SI or HI         Labs personally reviewed , BMP collected this AM still pending  CBC-Hgb: 12.4 WBC-5.4 plt-[id number]      Assessment: Patient has improved mood, appetite, and sleep and is tolerating current medications. Patient does not appear to have dysphagia, dental pain, or other physical barriers to eating. Lack of eating and motivation was likely secondary to depression and anhedonia. Continue Remeron at current dose for mood and appetite stimulation. She is still somnolent with decreased energy. Patient agreeable to start ritalin for energy and mood. Patient denies passive and active SI, intent, or plan and able to contract for safety. Legal hold will not be initiated as patient does not meet criteria.       Dx:  #Grief, prolonged  #Major depressive disorder, severe - current  R/o " Depression due to medical condition      Medical :  See medical note      Plan:  Legal hold: not applicable  Psychotropic medications  Continue Remeron 15mg PO qhs for depression and poor appetite  START ritalin 5mg with breakfast for energy and mood   Can give NOW dose today  Continue to hold Celexa due to ongoing nausea  Palliative consult completed; hospice referral pending  Labs reviewed  EKG reviewed  Old records reviewed   Collateral obtained; last spoke with granddaughterAiram on 8/29/23  Safety plan completed, copy in chart   Discussed the case with: Dr. Miramontes   Psychiatry will follow up     Thank you for the consult.       This note was created using voice recognition software (Dragon). The accuracy of the dictation is limited by the abilities of the software. I have reviewed the note prior to signing. However, error related to voice recognition software and /or scribes may still exist and should be interpreted within the appropriate context.

## 2023-08-31 NOTE — CARE PLAN
The patient is Stable - Low risk of patient condition declining or worsening    Shift Goals  Clinical Goals: placement, mobilize  Patient Goals: rest  Family Goals: updated over the phone    Progress made toward(s) clinical / shift goals:    Problem: Knowledge Deficit - Standard  Goal: Patient and family/care givers will demonstrate understanding of plan of care, disease process/condition, diagnostic tests and medications  Outcome: Progressing   POC discussed with pt and pts granddaughter. Case management notified so they can speak with pt and granddaughter.  Problem: Pain - Standard  Goal: Alleviation of pain or a reduction in pain to the patient’s comfort goal  Outcome: Progressing   Pt denied pain this AM. Educated pt on importance of pain control- pt verbalized understanding.      Patient is not progressing towards the following goals:

## 2023-08-31 NOTE — DISCHARGE PLANNING
Received choice form at: [id number]  Agency/Facility name: [facility]  Referral sent per choice form at:  [id number]

## 2023-09-01 PROBLEM — N30.00 ACUTE CYSTITIS WITHOUT HEMATURIA: Status: RESOLVED | Noted: 2023-08-24 | Resolved: 2023-09-01

## 2023-09-01 PROBLEM — F32.9 MAJOR DEPRESSIVE DISORDER: Status: ACTIVE | Noted: 2023-09-01

## 2023-09-01 PROBLEM — Z51.5 PALLIATIVE CARE ENCOUNTER: Status: RESOLVED | Noted: 2023-08-30 | Resolved: 2023-09-01

## 2023-09-01 PROBLEM — E87.1 HYPONATREMIA: Status: RESOLVED | Noted: 2023-08-27 | Resolved: 2023-09-01

## 2023-09-01 PROBLEM — Z02.9 DISCHARGE PLANNING ISSUES: Status: RESOLVED | Noted: 2023-08-29 | Resolved: 2023-09-01

## 2023-09-01 PROBLEM — R55 VASOVAGAL NEAR-SYNCOPE: Status: RESOLVED | Noted: 2023-08-15 | Resolved: 2023-09-01

## 2023-09-01 PROBLEM — N17.9 AKI (ACUTE KIDNEY INJURY) (HCC): Status: RESOLVED | Noted: 2023-08-26 | Resolved: 2023-09-01

## 2023-09-01 PROBLEM — Z71.89 ADVANCE CARE PLANNING: Status: RESOLVED | Noted: 2023-08-31 | Resolved: 2023-09-01

## 2023-09-01 PROCEDURE — 700102 HCHG RX REV CODE 250 W/ 637 OVERRIDE(OP): Performed by: STUDENT IN AN ORGANIZED HEALTH CARE EDUCATION/TRAINING PROGRAM

## 2023-09-01 PROCEDURE — A9270 NON-COVERED ITEM OR SERVICE: HCPCS | Performed by: INTERNAL MEDICINE

## 2023-09-01 PROCEDURE — G0378 HOSPITAL OBSERVATION PER HR: HCPCS

## 2023-09-01 PROCEDURE — A9270 NON-COVERED ITEM OR SERVICE: HCPCS | Performed by: STUDENT IN AN ORGANIZED HEALTH CARE EDUCATION/TRAINING PROGRAM

## 2023-09-01 PROCEDURE — 97530 THERAPEUTIC ACTIVITIES: CPT

## 2023-09-01 PROCEDURE — 700102 HCHG RX REV CODE 250 W/ 637 OVERRIDE(OP): Performed by: NURSE PRACTITIONER

## 2023-09-01 PROCEDURE — 99232 SBSQ HOSP IP/OBS MODERATE 35: CPT | Performed by: STUDENT IN AN ORGANIZED HEALTH CARE EDUCATION/TRAINING PROGRAM

## 2023-09-01 PROCEDURE — A9270 NON-COVERED ITEM OR SERVICE: HCPCS | Performed by: NURSE PRACTITIONER

## 2023-09-01 PROCEDURE — 700102 HCHG RX REV CODE 250 W/ 637 OVERRIDE(OP): Performed by: INTERNAL MEDICINE

## 2023-09-01 RX ORDER — MIRTAZAPINE 15 MG/1
15 TABLET, FILM COATED ORAL
Qty: 60 TABLET | Refills: 2 | Status: SHIPPED | OUTPATIENT
Start: 2023-09-01

## 2023-09-01 RX ORDER — METHYLPHENIDATE HYDROCHLORIDE 5 MG/1
5 TABLET ORAL
Qty: 60 TABLET | Refills: 0 | Status: SHIPPED | OUTPATIENT
Start: 2023-09-02 | End: 2023-11-01

## 2023-09-01 RX ADMIN — MIRTAZAPINE 15 MG: 15 TABLET, FILM COATED ORAL at 21:25

## 2023-09-01 RX ADMIN — METOPROLOL TARTRATE 25 MG: 25 TABLET, FILM COATED ORAL at 21:25

## 2023-09-01 RX ADMIN — OMEPRAZOLE 20 MG: 20 CAPSULE, DELAYED RELEASE ORAL at 08:36

## 2023-09-01 RX ADMIN — AMLODIPINE BESYLATE 10 MG: 10 TABLET ORAL at 08:36

## 2023-09-01 RX ADMIN — DABIGATRAN ETEXILATE MESYLATE 75 MG: 75 CAPSULE ORAL at 21:25

## 2023-09-01 RX ADMIN — OMEPRAZOLE 20 MG: 20 CAPSULE, DELAYED RELEASE ORAL at 21:25

## 2023-09-01 RX ADMIN — DABIGATRAN ETEXILATE MESYLATE 75 MG: 75 CAPSULE ORAL at 08:36

## 2023-09-01 RX ADMIN — Medication 1000 UNITS: at 04:28

## 2023-09-01 RX ADMIN — LEVOTHYROXINE SODIUM 100 MCG: 0.1 TABLET ORAL at 04:28

## 2023-09-01 RX ADMIN — ACETAMINOPHEN 1000 MG: 500 TABLET, FILM COATED ORAL at 04:28

## 2023-09-01 RX ADMIN — METHYLPHENIDATE HYDROCHLORIDE 5 MG: 5 TABLET ORAL at 08:36

## 2023-09-01 RX ADMIN — SENNOSIDES AND DOCUSATE SODIUM 2 TABLET: 50; 8.6 TABLET ORAL at 04:28

## 2023-09-01 ASSESSMENT — COGNITIVE AND FUNCTIONAL STATUS - GENERAL
SUGGESTED CMS G CODE MODIFIER MOBILITY: CK
TURNING FROM BACK TO SIDE WHILE IN FLAT BAD: A LITTLE
MOBILITY SCORE: 17
STANDING UP FROM CHAIR USING ARMS: A LITTLE
MOVING TO AND FROM BED TO CHAIR: A LITTLE
MOVING FROM LYING ON BACK TO SITTING ON SIDE OF FLAT BED: A LITTLE
WALKING IN HOSPITAL ROOM: A LITTLE
CLIMB 3 TO 5 STEPS WITH RAILING: A LOT

## 2023-09-01 ASSESSMENT — PAIN DESCRIPTION - PAIN TYPE
TYPE: ACUTE PAIN

## 2023-09-01 ASSESSMENT — ENCOUNTER SYMPTOMS
ABDOMINAL PAIN: 0
VOMITING: 0
FEVER: 0
PALPITATIONS: 0
SHORTNESS OF BREATH: 0
CHILLS: 0
DIZZINESS: 0
COUGH: 0
NAUSEA: 0
HEADACHES: 0
MYALGIAS: 0

## 2023-09-01 ASSESSMENT — GAIT ASSESSMENTS
GAIT LEVEL OF ASSIST: CONTACT GUARD ASSIST
DEVIATION: OTHER (COMMENT)
DISTANCE (FEET): 20
ASSISTIVE DEVICE: 4 WHEEL WALKER

## 2023-09-01 NOTE — DIETARY
Nutrition Update:    Day 0 of admit.  Felipa Chavez is a 82 y.o. female with admitting DX of Syncope and collapse [R55].  Patient being followed to optimize nutrition.    Current Diet: Soft and bite sized, thin liquids. Boost VHC and Magic cup sent once daily.   Per ADL, pt ate 50-75% of breakfast this am, previous four meal: 25-50% of meals X 2, 0% of meals X2. RN said pt got [employer] from family today and appeared to eat ~ 50% of one of the items but did not get a good look at what she was eating.   RD met with pt who reports poor intake,eating ~10% of meals + 1 carton of Boost VHC or protein drink from home. Pt does feel intake is improving though. Discussed the importance of adequate nutrition and benefit of the Boost Very High Calorie, pt verbalized understanding and agreed to try to drink 2 cartons of the Boost Very High Calorie/day. Noted pt food preferences with meals to help optimize intake (oatmeal with brown sugar and whole milk for breakfast, mashed potatoes and gravy with dinner and lunch).   Pt scheduled to D/C tomorrow at noon.     Problem: Nutritional:  Goal: Achieve adequate nutritional intake  Description: Patient will consume ~50%  of meals  Outcome: progressing slowly.       Plan/rec: Will add 2 cartons of the Boost Very High Calorie/day. Noted pt food preferences with meals to help optimize intake (oatmeal with brown sugar and whole milk for breakfast, mashed potatoes and gravy with dinner and lunch).  RD to continue to monitor for consistently adequate intake.

## 2023-09-01 NOTE — PROGRESS NOTES
Hospital Medicine Daily Progress Note    Date of Service  9/1/2023    Chief Complaint  Felipa Chavez is a 82 y.o. female with CAD, hypothyroid, HTN, and Afib admitted 8/15/2023 with near-syncope and collapse.    Hospital Course  No notes on file    Interval Problem Update    8/28: NESSA ON  She is having increased nausea today. Appetite is slightly improved. Multiple BM overnight and today.  POC discussed with Psychiatrist Dr. Miramontes, who advised increase in mirtazapine and hold celexa for now due to nausea. She will follow up in clinic.  POC discussed with RNNAVI Kim and GIOVANA Lambert. VARGAS approved for 6 months at . Unable to complete paperwork and set up transport this evening.  Advised Mrs. Chavez and Airam that HH services are intermittent and  staff will provide 24/7 care.  Advised Mrs. Chavez that rehabilitation with HHPT/OT will end if she declines to work with them. It is important that she participate to functionally improve and return to [place].    Above per previous hospitalist    08/29/23  Discussed with case management.  Still not accepted to group home.  On room air today.  Patient needs nocturnal oxygen.  She is not cooperating with PT.  She has been refusing to eat.  Palliative care consult placed to discuss possible hospice.  Discussed with psychiatry to increase mirtazapine.    08/30/23  No acute events overnight.  Per palliative care, patient is interested in hospice, referral placed.  Wants selective treatment at this time.  Discussed with psychiatry.  No new changes.  May consider stimulants.  Discussed with Complex Discharge Planning Committee.    08/31/23  Patient states that she does not want hospice.  Discussed with psychiatry recommending starting Ritalin.  Discussed with granddaughter does not want Ritalin at this time but patient is consenting.  Long discussion regarding advance care planning and discharge planning.  DME for commode and wheelchair ordered.  Granddaughter considering taking home for the  time being until the patient can be placed into a group home.    09/01/23  Patient with appearing more awake this morning.  She started Ritalin.  Discussed with case management.  Daughter planning to take the patient home tomorrow.  Discussed with daughter.  Answered questions.  Discussed with psychiatry.  No new changes.         I have discussed this patient's plan of care and discharge plan at IDT rounds today with Case Management, Nursing, Nursing leadership, and other members of the IDT team.    Consultants/Specialty  psychiatry and physiatry    Code Status  DNAR/DNI    Disposition  The patient is medically cleared for discharge to home or a post-acute facility.  Anticipate discharge to: home with organized home healthcare and close outpatient follow-up    I have placed the appropriate orders for post-discharge needs.    Review of Systems  Review of Systems   Constitutional:  Positive for malaise/fatigue. Negative for chills and fever.   Respiratory:  Negative for cough and shortness of breath.    Cardiovascular:  Negative for chest pain and palpitations.   Gastrointestinal:  Negative for abdominal pain, nausea and vomiting.   Genitourinary:  Negative for dysuria and hematuria.   Musculoskeletal:  Negative for joint pain and myalgias.   Neurological:  Negative for dizziness and headaches.        Physical Exam  Temp:  [35.9 °C (96.6 °F)-37.1 °C (98.8 °F)] 35.9 °C (96.6 °F)  Pulse:  [57-77] 77  Resp:  [16-17] 17  BP: (125-158)/(59-79) 128/59  SpO2:  [91 %-96 %] 94 %    Physical Exam  Vitals and nursing note reviewed.   Constitutional:       General: She is sleeping.      Appearance: She is cachectic. She is not ill-appearing or diaphoretic.   HENT:      Head: Normocephalic and atraumatic.      Mouth/Throat:      Mouth: Mucous membranes are moist.      Pharynx: Oropharynx is clear. No oropharyngeal exudate.   Eyes:      General:         Right eye: No discharge.         Left eye: No discharge.       Conjunctiva/sclera: Conjunctivae normal.      Pupils: Pupils are equal, round, and reactive to light.   Cardiovascular:      Rate and Rhythm: Normal rate and regular rhythm.      Pulses: Normal pulses.      Heart sounds: Normal heart sounds. No murmur heard.  Pulmonary:      Effort: Pulmonary effort is normal. No respiratory distress.      Breath sounds: Normal breath sounds.   Abdominal:      General: Abdomen is flat. Bowel sounds are normal. There is no distension.      Palpations: Abdomen is soft.      Tenderness: There is no abdominal tenderness.   Musculoskeletal:      Cervical back: Neck supple. No tenderness.      Right lower leg: No edema.      Left lower leg: No edema.   Neurological:      Mental Status: She is oriented to person, place, and time and easily aroused.      Motor: No weakness.   Psychiatric:         Attention and Perception: Attention normal.         Mood and Affect: Affect is labile and blunt.         Speech: Speech is delayed.         Behavior: Behavior is cooperative.         Fluids  No intake or output data in the 24 hours ending 09/01/23 1841      Laboratory  Recent Labs     08/31/23  1005   WBC 5.4   RBC 3.96*   HEMOGLOBIN 12.4   HEMATOCRIT 38.3   MCV 96.7   MCH 31.3   MCHC 32.4   RDW 47.8   PLATELETCT 279   MPV 9.5     Recent Labs     08/31/23  1005   SODIUM 136   POTASSIUM 4.6   CHLORIDE 103   CO2 22   GLUCOSE 99   BUN 20   CREATININE 0.86   CALCIUM 9.2                   Imaging  [id number] VIEW   Final Result      No acute process. Large amount of stool.      EC-ECHOCARDIOGRAM COMPLETE W/O CONT   Final Result      DX-CHEST-PORTABLE (1 VIEW)   Final Result      No acute cardiopulmonary abnormality.      CT-CSPINE WITHOUT PLUS RECONS   Final Result      No acute fracture or dislocation of the cervical spine.      Multilevel disc and facet degeneration.      CT-HEAD W/O   Final Result      1.  Chronic microvascular ischemic type changes.   2.  No acute intracranial abnormality.   3.   Right sphenoid sinusitis                Assessment/Plan  * Vasovagal near-syncope- (present on admission)  Assessment & Plan  Per patient report and security footage at bank she did not lose consciousness  Recurrent near-syncope events precipitated by nausea  Likely vasovagal due to chronic constipation and nausea  CTH negative for acute process  TTE without significant valvular disease  EKG and telemetry NSR without Afib RVR    Major depressive disorder- (present on admission)  Assessment & Plan  As per psychiatry.  Patient's decreased appetite and anhedonia related to depression.    Continue mirtazapine  Started Ritalin today.  Discussed with psychiatry.    Discharge planning issues- (present on admission)  Assessment & Plan  Discussed with complex discharge planning committee.  Patient has too much assets to qualify for group home waiver.  She has not been cooperating with physical therapy.  Refusing to eat.    Patient is trying to get accepted to a group home with home health.    Hyponatremia  Assessment & Plan  Mild  Likely due to inadequate PO intake  Repeat BMP only if clinical change    Slow transit constipation- (present on admission)  Assessment & Plan  Likely polyfactorial including chronic idiopathic constipation, poor PO intake, and decreased mobility with possible neurogenic bowel  Reportedly evaluated by colonoscopy previously  Exacerbated by overflow fecal incontinence  Scheduled miralax with PRN doses for catharsis  PRN bisacodyl for refractory constipation    Failure to thrive in adult- (present on admission)  Assessment & Plan  Weakness and severe protein-calorie malnutrition due to severe MDD  TSH and B12 WNL  PT/OT/SLP evaluations, recommend post-acute placement but not possible on OBS status  Physiatry consulted, not a candidate for IPR  RNCM assisting with GH coordination, TB Quant negative   RN/SW/PT/OT/RD/SLP ordered    Severe protein-calorie malnutrition (HCC)- (present on  admission)  Assessment & Plan  ~25% weight loss (30 lbs) preceding hospitalization  Bitemporal wasting  RD consult, agreeable to Boost supplements  Unrestricted diet, encouraged to request and have family deliver desirable food    KIYA (acute kidney injury) (HCC)- (present on admission)  Assessment & Plan  Resolved with IVF  Likely pre-renal from inadequate PO intake  Encouraging PO hydration    Current 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 Miramontes scheduled    08/29/23  Discussed with psychiatry.  Increasing mirtazapine.  Discontinue citalopram.    08/31/23  Discussed with psychiatry.  Continuing mirtazapine.  Recommending starting Ritalin.  Ordered for tomorrow.    Acute cystitis without hematuria  Assessment & Plan  Symptomatic by dysuria 8/23  UA +Pyuria  UCx grew ESBL E coli  Macrobid for 5-day course per pharmacy  Likely colonized from hospitalization, did not meet sepsis criteria nor worsen with ceftriaxone which was resistant    Paroxysmal atrial fibrillation (HCC)- (present on admission)  Assessment & Plan  Continue metoprolol and pradaxa    Chronic nausea- (present on admission)  Assessment & Plan  Uncertain etiology, likely component of slow transit constipation / gastroparesis  Per patient has been evaluated with EGD and Colonoscopy  KUB demonstrates heavy stool burden  Lactulose transitioned to miralax TID for catharsis from constipation  Zofran PRN with compazine PRN second-line    JEREMIAH (obstructive sleep apnea)- (present on admission)  Assessment & Plan  Unable to tolerate CPAP    Prediabetes- (present on admission)  Assessment & Plan  A1c 5.9  Weight  loss not indicated due to severe protein-calorie malnutrition  Repeat A1c in 3 months with PCP    Acquired hypothyroidism- (present on admission)  Assessment & Plan  TSH WNL  Continue levothyroxine    Gastroesophageal reflux disease with esophagitis without hemorrhage- (present on admission)  Assessment & Plan  Continue omeprazole    Primary hypertension- (present on admission)  Assessment & Plan  Continue metoprolol and amlodipine    Advance care planning- (present on admission)  Assessment & Plan  I answered further questions regarding patient's medical condition with the patient's granddaughter.  I discussed my concerns that the patient was nearing the end of her life exhibiting decrease in eating as well as poor engagement with social environments.   She talked about maybe transferring her to [place] where she can get assistance from [facility].  However, she was not certain whether patient would be able to handle the transport.  I encouraged her to think very carefully about the endpoint in regards to the patient as I did not feel that the patient would make a meaningful recovery to the point that she could function at her previous baseline.  I informed her that her recent weight is 90 pounds, which she noted was significantly decreased from 120 pounds.  I informed her these are all signs of severe protein calorie malnutrition and signs that the patient was nearing the end of her life.  She stated that she was considering taking her to her home for the time being.  She expressed concerns about being home by herself to take care of the grandmother.  She stated that she recently had to take care of another family member with hospice which was difficult at home.  I explained the recommendations of the complex discharge committee, recommended selling off patient's assets to pay for her group home until she would qualify for a group home waiver.  She stated that the patient really did not have any assess  and that her motor home was under her son's name.  We will continue to have further discussions in regards to her difficult discharge planning.    Advance care planning total time 22 minutes    DNR (do not resuscitate)- (present on admission)  Assessment & Plan  Per discussion. DNR/DNI.  POLST completed by Palliative care.  Appreciate assistance.    Palliative care encounter- (present on admission)  Assessment & Plan  Per discussion palliative care, patient is interested in hospice.  Referral placed.  Selective treatment during hospitalization.       VTE prophylaxis: Therapeutic anticoagulation with Pradaxa 75 mg twice daily.      I have performed a physical exam and reviewed and updated ROS and Plan today (9/1/2023). In review of yesterday's note (8/31/2023), there are no changes except as documented above.

## 2023-09-01 NOTE — PROGRESS NOTES
Pts granddaughter called and stated that pts insurance is [employer] and no one has contacted them and they are requesting info faxed to [phone].

## 2023-09-01 NOTE — THERAPY
Physical Therapy   Daily Treatment     Patient Name: Felipa Chavez  Age:  82 y.o., Sex:  female  Medical Record #: [id number]  Today's Date: 9/1/2023     Precautions  Precautions: Fall Risk    Assessment  PT orders previously cancelled, and now re-ordered. No changes in status, and prior POC remains appropriate. Pt currently requires SBA-CGA for functional tasks, and is able to ambulate short distances to/from the bathroom. Encourage up to chair for meals, and ambulation to/from the bathroom with nursing to increase repetition of activity. Pt would benefit from continued PT services, as pt is below prior independent level of function.    Plan    Treatment Plan Status: Continue Current Treatment Plan  Type of Treatment: Bed Mobility, Debridement, Equipment, Group Therapy, Gait Training, Orthotics Training , Neuro Re-Education / Balance, Manual Therapy, Self Care / Home Evaluation, Stair Training, Therapeutic Activities, Therapeutic Exercise  Treatment Frequency: 4 Times per Week  Treatment Duration: Until Therapy Goals Met    DC Equipment Recommendations:  (Pt has 4WW at home. May benefit from a wc for longer distances or with fatigue onset)  Discharge Recommendations: Other - (post-acute rehab vs home with support and HHPT (pending support available))      Subjective    Pt reported wanting to get out of bed and walk more. Pt reported nausea with activity.     Objective     09/01/23 5396   Precautions   Precautions Fall Risk   Pain   Pain Scales 0 to 10 Scale    Pain 0 - 10 Group   Pain Rating Scale (NPRS) 0   Balance   Sitting Balance (Static) Fair   Standing Balance (Static) Fair -   Bed Mobility    Supine to Sit Supervised  (HOB elevated, use of rail)   Sit to Supine Supervised  (HOB elevated)   Gait Analysis   Gait Level Of Assist Contact Guard Assist   Assistive Device 4 Wheel Walker   Distance (Feet) 20   # of Times Distance was Traveled 2   Deviation Other (Comment)  (Decreased jesus, decreased B step length, one  minor posterior LOB)   Skilled Intervention Verbal Cuing   Functional Mobility   Sit to Stand Contact Guard Assist  (4WW)   Toilet Transfers Contact Guard Assist  (mod cues to optimize positioning on commode, use of 4WW and grab bar)   How much difficulty does the patient currently have...   Turning over in bed (including adjusting bedclothes, sheets and blankets)? 3   Sitting down on and standing up from a chair with arms (e.g., wheelchair, bedside commode, etc.) 3   Moving from lying on back to sitting on the side of the bed? 3   How much help from another person does the patient currently need...   Moving to and from a bed to a chair (including a wheelchair)? 3   Need to walk in a hospital room? 3   Climbing 3-5 steps with a railing? 2   6 clicks Mobility Score 17   Short Term Goals    Short Term Goal # 1 Pt will perform sit<>supine with supervision   Goal Outcome # 1 Progressing as expected   Short Term Goal # 2 Pt will perform sit<>stand and stand pivot transfers with 4WW and supervision.   Goal Outcome # 2 Goal met   Short Term Goal # 3 Pt will ambulate 100' with 4WW and supervision.   Goal Outcome # 3 Progressing as expected   Short Term Goal # 4 Pt will perform 16 stairs with supervision.   Goal Outcome # 4 Progressing slower than expected   Education Group   Additional Comments Discussed overall goals, and importance of mobility. Provided encouragement to increase activity/engagement, and pt expressed wanting to walk around more.   Anticipated Discharge Equipment and Recommendations   DC Equipment Recommendations   (Pt has 4WW at home. May benefit from a wc for longer distances or with fatigue onset)   Discharge Recommendations Other -  (post-acute rehab vs home with support and HHPT (pending support available))   Interdisciplinary Plan of Care Collaboration   IDT Collaboration with  Nursing   Patient Position at End of Therapy In Bed;Bed Alarm On;Call Light within Reach;Tray Table within Reach;Phone within  Reach   Collaboration Comments updated RN   Session Information   Date / Session Number  9/1-1(1/4, 9/7)

## 2023-09-01 NOTE — PROGRESS NOTES
Bedside report received.  Assessment complete.  A&O x 4. Patient calls appropriately.  Patient ambulates with x1 with FWW assist. Bed alarm on.   Patient has 0/10 pain. Pain managed with prescribed medications.  Denies N&V. Regular diet ordered. Poor oral intake  + void, + flatus, + BM.  Patient denies SOB.  SCD's refused.  Review plan with of care with patient. Call light and personal belongings within reach. Hourly rounding in place. All needs met at this time.

## 2023-09-01 NOTE — DISCHARGE PLANNING
DC Transport Scheduled    Received request at: 9/1/2023    Transport Company Scheduled:  [facility]    Scheduled Date: 9/2/2023  Scheduled Time: 1200    Destination: HOME   [address] [place]     Notified care team of scheduled transport via Vo[employer]te.     If there are any changes needed to the DC transportation scheduled, please contact [facility] at ext. [phone] between the hours of 7136-1205 Mon-Fri. If outside those hours, contact the ED Case Manager at ext. [phone].

## 2023-09-01 NOTE — DISCHARGE PLANNING
"RNCM spoke with patients granddaughter- Airam. Per Airam- patient has [employer] insurance # [id number]. RNCM forwarded information to More VENTURA RN.   Per Airam- she cannot take care of patient by herself. Other family members are not willing to assist. Pt can not go to [place] as \"Megan\" cannot care for patient as well. RNCM to speak with patient regarding group home with HH to follow. VARGAS approved for 3 months with CM. Patient refusing GH. \" I only have $2,000.00 in my checking account\" \"My RV is a 1988 I could only get $5,000.00 if I'm christiana\"    13:55- Patient's granddaughter- Airam is here. Patient's choice were presented to her, home with Airam or to shelter. Airam is agreeable to taking patient home. [facility] contacted for [facility] transport for 9/2 @ 12:00 noon. RNCM will send HH choice form ([facility]) to [facility]. DCP- home with grandciarraughter and Amie to follow for HH needs.    *Granddaughter's address [address] [place]. [address].  "

## 2023-09-01 NOTE — CARE PLAN
The patient is Watcher - Medium risk of patient condition declining or worsening    Shift Goals  Clinical Goals: skin integrity, improve diet, OOB activity  Patient Goals: rest  Family Goals: updated over the phone    Progress made toward(s) clinical / shift goals:      Patient will remain free from falls with use of bed alarm, frequent rounding and toileting, and a bed close to the nurses station. Patient's skin will remain intact with using a waffle overlay, frequent mobility, encouraged nutritional intake, and frequent prompting and education of repositioning.    Problem: Fall Risk  Goal: Patient will remain free from falls  Outcome: Progressing     Problem: Skin Integrity  Goal: Skin integrity is maintained or improved  Outcome: Progressing

## 2023-09-01 NOTE — CARE PLAN
The patient is Stable - Low risk of patient condition declining or worsening    Shift Goals  Clinical Goals: mobilize, safety  Patient Goals: rest  Family Goals: updated over the phone    Progress made toward(s) clinical / shift goals:    Problem: Knowledge Deficit - Standard  Goal: Patient and family/care givers will demonstrate understanding of plan of care, disease process/condition, diagnostic tests and medications  Outcome: Progressing     Problem: Fall Risk  Goal: Patient will remain free from falls  Outcome: Progressing     Problem: Depression  Goal: Patient and family/caregiver will verbalize accurate information about at least two of the possible causes of depression, three-four of the signs and symptoms of depression  Outcome: Progressing     Problem: Pain - Standard  Goal: Alleviation of pain or a reduction in pain to the patient’s comfort goal  Outcome: Progressing     Problem: Skin Integrity  Goal: Skin integrity is maintained or improved  Outcome: Progressing     Problem: Provide Safe Environment  Goal: Suicide environmental safety, protocols, policies, and practices will be implemented  Outcome: Progressing     Problem: Psychosocial  Goal: Patient's ability to identify and develop effective coping behaviors will improve  Outcome: Progressing  Goal: Patient's ability to identify and utilize available support systems will improve  Outcome: Progressing       Patient is not progressing towards the following goals:

## 2023-09-01 NOTE — ASSESSMENT & PLAN NOTE
I answered further questions regarding patient's medical condition with the patient's granddaughter.  I discussed my concerns that the patient was nearing the end of her life exhibiting decrease in eating as well as poor engagement with social environments.   She talked about maybe transferring her to [place] where she can get assistance from [facility].  However, she was not certain whether patient would be able to handle the transport.  I encouraged her to think very carefully about the endpoint in regards to the patient as I did not feel that the patient would make a meaningful recovery to the point that she could function at her previous baseline.  I informed her that her recent weight is 90 pounds, which she noted was significantly decreased from 120 pounds.  I informed her these are all signs of severe protein calorie malnutrition and signs that the patient was nearing the end of her life.  She stated that she was considering taking her to her home for the time being.  She expressed concerns about being home by herself to take care of the grandmother.  She stated that she recently had to take care of another family member with hospice which was difficult at home.  I explained the recommendations of the complex discharge committee, recommended selling off patient's assets to pay for her group home until she would qualify for a group home waiver.  She stated that the patient really did not have any assess and that her motor home was under her son's name.  We will continue to have further discussions in regards to her difficult discharge planning.    Advance care planning total time 22 minutes

## 2023-09-02 ENCOUNTER — PHARMACY VISIT (OUTPATIENT)
Dept: PHARMACY | Facility: MEDICAL CENTER | Age: 83
End: 2023-09-02
Payer: COMMERCIAL

## 2023-09-02 VITALS
DIASTOLIC BLOOD PRESSURE: 62 MMHG | SYSTOLIC BLOOD PRESSURE: 163 MMHG | TEMPERATURE: 97.3 F | OXYGEN SATURATION: 96 % | HEIGHT: 59 IN | WEIGHT: 90.17 LBS | RESPIRATION RATE: 14 BRPM | HEART RATE: 53 BPM | BODY MASS INDEX: 18.18 KG/M2

## 2023-09-02 PROCEDURE — A9270 NON-COVERED ITEM OR SERVICE: HCPCS | Performed by: NURSE PRACTITIONER

## 2023-09-02 PROCEDURE — A9270 NON-COVERED ITEM OR SERVICE: HCPCS | Performed by: STUDENT IN AN ORGANIZED HEALTH CARE EDUCATION/TRAINING PROGRAM

## 2023-09-02 PROCEDURE — RXMED WILLOW AMBULATORY MEDICATION CHARGE: Performed by: STUDENT IN AN ORGANIZED HEALTH CARE EDUCATION/TRAINING PROGRAM

## 2023-09-02 PROCEDURE — 700102 HCHG RX REV CODE 250 W/ 637 OVERRIDE(OP): Performed by: STUDENT IN AN ORGANIZED HEALTH CARE EDUCATION/TRAINING PROGRAM

## 2023-09-02 PROCEDURE — G0378 HOSPITAL OBSERVATION PER HR: HCPCS

## 2023-09-02 PROCEDURE — 700102 HCHG RX REV CODE 250 W/ 637 OVERRIDE(OP): Performed by: INTERNAL MEDICINE

## 2023-09-02 PROCEDURE — 99239 HOSP IP/OBS DSCHRG MGMT >30: CPT | Performed by: STUDENT IN AN ORGANIZED HEALTH CARE EDUCATION/TRAINING PROGRAM

## 2023-09-02 PROCEDURE — 700111 HCHG RX REV CODE 636 W/ 250 OVERRIDE (IP): Performed by: NURSE PRACTITIONER

## 2023-09-02 PROCEDURE — A9270 NON-COVERED ITEM OR SERVICE: HCPCS | Performed by: INTERNAL MEDICINE

## 2023-09-02 PROCEDURE — 700102 HCHG RX REV CODE 250 W/ 637 OVERRIDE(OP): Performed by: NURSE PRACTITIONER

## 2023-09-02 RX ADMIN — DABIGATRAN ETEXILATE MESYLATE 75 MG: 75 CAPSULE ORAL at 08:20

## 2023-09-02 RX ADMIN — OMEPRAZOLE 20 MG: 20 CAPSULE, DELAYED RELEASE ORAL at 08:21

## 2023-09-02 RX ADMIN — ONDANSETRON 4 MG: 4 TABLET, ORALLY DISINTEGRATING ORAL at 10:55

## 2023-09-02 RX ADMIN — LEVOTHYROXINE SODIUM 100 MCG: 0.1 TABLET ORAL at 05:46

## 2023-09-02 RX ADMIN — AMLODIPINE BESYLATE 10 MG: 10 TABLET ORAL at 08:21

## 2023-09-02 RX ADMIN — ACETAMINOPHEN 1000 MG: 500 TABLET, FILM COATED ORAL at 02:36

## 2023-09-02 RX ADMIN — Medication 1000 UNITS: at 05:46

## 2023-09-02 RX ADMIN — SENNOSIDES AND DOCUSATE SODIUM 2 TABLET: 50; 8.6 TABLET ORAL at 05:46

## 2023-09-02 RX ADMIN — METHYLPHENIDATE HYDROCHLORIDE 5 MG: 5 TABLET ORAL at 08:21

## 2023-09-02 ASSESSMENT — PAIN DESCRIPTION - PAIN TYPE
TYPE: ACUTE PAIN

## 2023-09-02 NOTE — DISCHARGE INSTRUCTIONS
Syncope, Adult    Syncope is when you pass out or faint for a short time. It is caused by a sudden decrease in blood flow to the brain. This can happen for many reasons. It can sometimes happen when seeing blood, getting a shot (injection), or having pain or strong emotions. Most causes of fainting are not dangerous, but in some cases it can be a sign of a serious medical problem.  If you faint, get help right away. Call your local emergency services (911 in the [place]  Follow these instructions at home:  Watch for any changes in your symptoms. Take these actions to stay safe and help with your symptoms:  Knowing when you may be about to faint  Signs that you may be about to faint include:  Feeling dizzy or light-headed. It may feel like the room is spinning.  Feeling weak.  Feeling like you may vomit (nauseous).  Seeing spots or seeing all white or all black.  Having cold, clammy skin.  Feeling warm and sweaty.  Hearing ringing in the ears.  If you start to feel like you might faint, sit or lie down right away. If sitting, lower your head down between your legs. If lying down, raise (elevate) your feet above the level of your heart.  Breathe deeply and steadily. Wait until all of the symptoms are gone.  Have someone stay with you until you feel better.  Medicines  Take over-the-counter and prescription medicines only as told by your doctor.  If you are taking blood pressure or heart medicine, sit up and stand up slowly. Spend a few minutes getting ready to sit and then stand. This can help you feel less dizzy.  Lifestyle  Do not drive, use machinery, or play sports until your doctor says it is okay.  Do not drink alcohol.  Do not smoke or use any products that contain nicotine or tobacco. If you need help quitting, ask your doctor.  Avoid hot tubs and saunas.  General instructions  Talk with your doctor about your symptoms. You may need to have testing to help find the cause.  Drink enough fluid to keep your pee  (urine) pale yellow.  Avoid standing for a long time. If you must stand for a long time, do movements such as:  Moving your legs.  Crossing your legs.  Flexing and stretching your leg muscles.  Squatting.  Keep all follow-up visits.  Contact a doctor if:  You have episodes of near fainting.  Get help right away if:  You pass out or faint.  You hit your head or are injured after fainting.  You have any of these symptoms:  Fast or uneven heartbeats (palpitations).  Pain in your chest, belly, or back.  Shortness of breath.  You have jerky movements that you cannot control (seizure).  You have a very bad headache.  You are confused.  You have problems with how you see (vision).  You are very weak.  You have trouble walking.  You are bleeding from your mouth or your butt (rectum).  You have black or tarry poop (stool).  These symptoms may be an emergency. Get help right away. Call your local emergency services (911 in the [place]  Do not wait to see if the symptoms will go away.  Do not drive yourself to the hospital.  Summary  Syncope is when you pass out or faint for a short time. It is caused by a sudden decrease in blood flow to the brain.  Signs that you may be about to faint include feeling dizzy or light-headed, feeling like you may vomit, seeing all white or all black, or having cold, clammy skin.  If you start to feel like you might faint, sit or lie down right away. Lower your head if sitting, or raise (elevate) your feet if lying down. Breathe deeply and steadily. Wait until all of the symptoms are gone.  This information is not intended to replace advice given to you by your health care provider. Make sure you discuss any questions you have with your health care provider.  Document Revised: 04/28/2022 Document Reviewed: 04/28/2022  Elsevier Patient Education © 2023 [employer]    Depression, Adult  Depression is feeling sad, low, down in the dumps, blue, gloomy, or empty. In general, there are two kinds of  depression:  Normal sadness or grief. This can happen after something upsetting. It often goes away on its own within 2 weeks. After losing a loved one (bereavement), normal sadness and grief may last longer than two weeks. It usually gets better with time.  Clinical depression. This kind lasts longer than normal sadness or grief. It keeps you from doing the things you normally do in life. It is often hard to function at home, work, or at school. It may affect your relationships with others. Treatment is often needed.  GET HELP RIGHT AWAY IF:  You have thoughts about hurting yourself or others.  You lose touch with reality (psychotic symptoms). You may:  See or hear things that are not real.  Have untrue beliefs about your life or people around you.  Your medicine is giving you problems.  MAKE SURE YOU:  Understand these instructions.  Will watch your condition.  Will get help right away if you are not doing well or get worse.     This information is not intended to replace advice given to you by your health care provider. Make sure you discuss any questions you have with your health care provider.     Document Released: 01/20/2012 Document Revised: 01/08/2016 Document Reviewed: 04/18/2013  [employer] Interactive Patient Education ©2016 [employer]    ESBL Infection  ESBL (extended-spectrum beta-lactamase) is an enzyme that is made by some types of bacteria. This enzyme breaks down some antibiotic medicines, making them unable to kill the bacteria or treat the infection. The two most common types of bacteria that make ESBL in the stomach and intestines (gastrointestinal tract, or GI tract) are E. coli and Klebsiella pneumoniae. These types of bacteria help break down food so it can be used by the body for energy. Normally, they do not cause infection unless there are too many bacteria, or unless the bacteria travel to other parts of the body where they are not normally found.  ESBL infections are hard to treat.  "Bacteria that make ESBL are sometimes called \"super bugs\" because they are very hard to get rid of. Lab tests must be done to find the type of ESBL bacteria that is causing the infection and the right antibiotic medicine that will treat it.  What are the causes?  This condition is caused by certain types of bacteria that make ESBL enzymes as a way to survive the effects of antibiotics. This type of infection is most commonly caused by E. coli and Klebsiella pneumoniae bacteria, which make the ESBL enzymes.  ESBL bacteria can live outside the body, on the skin, or on other surfaces. People who are infected with ESBL bacteria may shed the bacteria in their stool (feces) or in body fluids such as urine or blood. These bacteria can be spread to surfaces, such as bedding, medical equipment, countertops, and bathroom fixtures. The bacteria can also be spread by direct contact with hands or body fluids that have the bacteria on or in them (are contaminated).  What increases the risk?  You may be more likely to develop this condition if:  You are now or have recently been admitted to a hospital, nursing home, or other health care facility.  You have another illness or a weakened disease-fighting system (immune system).  You have drains or tubes placed in your body.  You have used antibiotics before.  You have sores or open wounds.  You have traveled to a place where ESBL germs are more commonly found.  What are the signs or symptoms?  Conditions that can be caused by ESBL-producing bacteria include diarrhea, skin infections, pneumonia, and urinary tract infections (UTIs).  Symptoms depend on the location and type of infection and may include:  Fever.  Chills.  Needing to urinate often or feeling burning with urination.  Reddened, warm skin around the site of infection.  Diarrhea, extra gas, or bloating.  Nausea, vomiting, or loss of appetite.  Not knowing the time of day, where you are, or who you are (feeling " disoriented).  How is this diagnosed?    This condition is diagnosed with a physical exam and lab testing. You may have a blood, urine, or stool sample taken. Samples can be looked at under a microscope by trained health care providers who can determine the type of bacteria growing in the samples. These samples can also be tested to see if certain antibiotic medicines can kill the bacteria.  How is this treated?  ESBL infections can be treated after the right antibiotic medicine is found that can kill the bacteria. Antibiotic medicines may be given at home or in the hospital. Some of these medicines can be taken by mouth, and others must be given through an IV.  Follow these instructions at home:  Take your antibiotic medicine as told by your health care provider. Do not stop taking the antibiotic even if you start to feel better. This is important.  Rest.  Practice ways to keep clean (good hygiene).  Prevent spread.  Keep all follow-up visits. This is important.  How is this prevented?  Preventing ESBL infections in hospitals  To prevent the spread of ESBL infections, health care providers will:  Wash their hands with soap and water for at least 20 seconds, or clean their hands with an alcohol-based hand , before they enter or leave your room.  Clean and disinfect your room and the medical equipment that is being used for your care.  Put a sign on your door to let visitors and health care providers know to use safety measures (contact precautions) when they care for you. This means health care providers will:  Put you in a private room.  Put on a gown with long sleeves and wear gloves when they care for you, and then remove the gloves and gown before leaving your room.  Ask your visitors to wear a gown and gloves, if the visitors will help care for you.  Ask your visitors to wash or sanitize their hands before they enter or leave your room.  Ask you not to leave your room to visit other parts of the  "hospital.  Preventing ESBL infections at home  You can help to prevent the spread of ESBL bacteria by:  Reminding health care providers, cleaning staff, and visitors to wash their hands with soap and water for at least 20 seconds, or clean their hands with an alcohol-based hand , before they enter or leave your room.  Taking antibiotics exactly as told by your health care provider.  Washing your hands with soap and water for at least 20 seconds, or cleaning your hands with an alcohol-based hand , after you do any of these things:  Use the bathroom.  Touch or come in contact with your stool or body fluids.  Touch medical equipment or surfaces that may have come in contact with your stool or body fluids.  Touch or care for wounds or open sores.  Touch or care for tubes or drains that are placed in your body.    Preventing the spread of ESBL infections to visitors  Visitors are at risk for infection if they come in contact with unclean surfaces or equipment, or with your stool or body fluids, while they are in your room. The risk of infection is higher if visitors then touch openings in their own bodies, such as their eyes, nose, mouth, or a sore. Doing that can let the bacteria enter their bodies. Casual contact, such as hugging or touching, will not spread ESBL.  Summary  ESBL is an enzyme that is made by some types of bacteria.  ESBL infections are hard to treat. Bacteria that make ESBL are sometimes called \"super bugs\" because they are very hard to get rid of.  Lab tests must be done to find the type of ESBL bacteria that is causing the infection and the right antibiotic medicine that will treat it.  You can help stop the spread of ESBL infection by washing your hands often with soap and water for at least 20 seconds or cleaning your hands with an alcohol-based hand , especially after using the bathroom.  This information is not intended to replace advice given to you by your health care " provider. Make sure you discuss any questions you have with your health care provider.  Document Revised: 05/01/2021 Document Reviewed: 05/01/2021  Elsevier Patient Education © 2023 [employer]

## 2023-09-02 NOTE — CARE PLAN
Problem: Skin Integrity  Goal: Skin integrity is maintained or improved  Outcome: Progressing  Skin integrity interventions: devices in place: waffle overlay, silicone O2 tubing with gray foam ear piece and pillows in use for proper positioning.       The patient is Stable - Low risk of patient condition declining or worsening    Shift Goals  Clinical Goals: rest  Patient Goals: rest  Family Goals: na    Progress made toward(s) clinical / shift goals:  progressing     Patient is not progressing towards the following goals:

## 2023-09-02 NOTE — PROGRESS NOTES
Pt discharged to home via medical transportation. AVS printed and reviewed with patient. All questions answered. Pt verbalizes agreement with discharge. Pt took a shower prior to discharge. Meds to bed delivered to pt by pharmacy. All personal belongings taken by pt. Family at bedside.

## 2023-09-02 NOTE — DISCHARGE PLANNING
Case Management Discharge Planning    Admission Date: 8/15/2023  GMLOS:    ALOS: 0    6-Clicks ADL Score: 18  6-Clicks Mobility Score: 17  PT and/or OT Eval ordered: Yes  Post-acute Referrals Ordered: Yes  Post-acute Choice Obtained: Yes  Has referral(s) been sent to post-acute provider:  Yes      Anticipated Discharge Dispo: Discharge Disposition: D/T to home under [facility] care in anticipation of covered skilled care (06)    DME Needed: No    Action(s) Taken: OTHER    LSW called pt granddaughter Airam to inform her of the pt transport time home. Pt granddaughter stated she is aware of the pt transport time and is agreeable with her transport home.     Escalations Completed: None    Medically Clear: Yes    Next Steps: LSW will continue to assist with discharge as needed.           Barriers to Discharge: Transportation

## 2023-09-02 NOTE — PROGRESS NOTES
"PSYCHIATRIC FOLLOW-UP:(established)     *Reason for admission: Near syncopal event  *Reason for consult: depression, grief, medication management       *Legal Hold Status: not applicable           Chart reviewed.          HPI:  Patient reports mood is better today since she found out that she is going to be going home with her granddaughter. She still feels depressed but mood is somewhat better. She did deny nausea/stomach pain. NO side effects noted from dose of methylphenidate today. She does feel \"less sleepy\" this afternoon. She did endorse not sleeping overnight as well. She did deny SI/HI. NO hallucinations or delusions    Nursing staff reports patient has been awake most of the day. She has agreed to drink boost shakes but inake is still low. She has been on the phone talking to family today. She did get up to use the restroom but felt tired after that. She has been compliant with medications. No other behavioral issues at this time.     Discharge planned for tomorrow     Medical ROS (as pertinent):     Review of Systems   Constitutional:  awake, alert, less sleepy today  Respiratory:  Negative for shortness of breath.    Cardiovascular:  Negative for chest pain and palpitations.   Gastrointestinal:  Denies nausea. Negative for abdominal pain, constipation, diarrhea and vomiting.   Neurological:  Positive for weakness. Negative for dizziness and headaches.   Psychiatric/Behavioral:  Positive for depression but no suicidal ideations. Negative for hallucinations. The patient is not nervous/anxious and does not have insomnia.          Psychiatric Examination:  Vitals: BP-128/59, HR-77  General appearance: appears stated age, appropriate, casual, and neat grooming and hygiene.   Behavior: Pt is calm and cooperative with interview and drowsy .  No apparent distress.  Eye contact is limited/fleeting. with multiple instances of closed eyes.  Abnormal movements: Psychomotor agitation not noted. Some retardation " "noted.  Tics or tremors not noted.   Gait/posture: Not observed  Speech:  normal volume, normal rhythm, slow rate  Thought Process: Logical and Goal-directed  Thought Content:  denies passive  suicidal ideation, intent, and plan, denies homicidal ideation. Within normal limits  Perception: denies auditory hallucinations, denies visual hallucinations. Not responding to internal stimuli. No delusions or paranoia noted on interview.   Judgment and Insight: fair/fair  Orientation: Alert and Fully Oriented  Recent and remote memory: No gross evidence of memory deficits  Attention span and concentration: intact  Language: English, fluent  Fund of Knowledge: appropriate for age and education  Mood: \"better\"  Affect: Congruent with stated mood, euthymic  SI/HI: Denies passive and active SI or HI                               Labs personally reviewed ,    BMP 8/31/23 Na-136. K-4.6, BUN/Cr-20/0.68 Ca-9.2     Assessment: Patient has improved mood, appetit though appetite still low. Sleep was not as good last night but overall has been better. Patient does not appear to have dysphagia, dental pain, or other physical barriers to eating. Lack of eating and motivation was likely secondary to depression and anhedonia. Continue Remeron at current dose for mood and appetite stimulation and continue ritalin for energy and mood. Patient denies passive and active SI, intent, or plan and able to contract for safety. Legal hold will not be initiated as patient does not meet criteria.         Dx:  #Grief, prolonged  #Major depressive disorder, severe - current  R/o Depression due to medical condition        Medical :  See medical note        Plan:  Legal hold: not applicable  Psychotropic medications  Continue Remeron 15mg PO qhs for depression and poor appetite  Continue ritalin 5mg with breakfast for energy and mood           Spoke with Granddaughter Airam who agrees with above plan  Palliative consult completed; hospice referral " pending  Labs reviewed  EKG reviewed  Old records reviewed   Collateral obtained; last spoke with granddaughter, Airam on 9/1/23  Safety plan completed, copy in chart        Thank you for the consult.

## 2023-09-02 NOTE — DISCHARGE SUMMARY
Discharge Summary    CHIEF COMPLAINT ON ADMISSION  Chief Complaint   Patient presents with    Fall       Reason for Admission  Syncope and collapse, failure to thrive    Admission Date  8/15/2023    CODE STATUS  DNAR/DNI    HPI & HOSPITAL COURSE  Felipa Chavez is a 82 y.o. female who presented on 8/15/2023 after having an episode of syncope and collapse.  This is a pleasant woman with a history of hypothyroidism, hypertension, coronary disease, obstructive sleep apnea with intermittent use of CPAP, chronic nausea of unclear etiology, and depression.  Patient started to feel dizzy and nauseated while at the bank and then passed out.  She recalls lying on the floor feeling confused and unclear what happened.  No preceding lightheadedness, chest pain, or palpitations.  She reported history of intermittent nausea for the past few years, which prompted multiple evaluations including EGD and colonoscopy.  She rarely has vomiting associated with these episodes of nausea.  In the past couple of months, her nausea began to be associated with dizziness and most recently syncope.  During her previous episode, she was leaning on a shopping cart at [facility] when she suddenly had nausea and dizziness feeling like she was going to pass out.  She reported significant stress since her  passed recently had lost 30 pounds over the last 6 months.  Since then, she has lived with 3 daughters and grandchildren, but this had apparently been not working out, she had been feeling significant depressed and like she had no place to live.  She had not been seen by a provider since last February or March when she was living in [place].  She most recently living with her granddaughter locally but felt that the home life was incredibly stressful for her.  She reported having a CPAP for night but was unable to get any sleep with it on.  She reported feeling very tired and fatigue upon awakening.  She did not seen and provider for adjustment  of her settings since last November.    In the emergency room, head CT was negative for acute findings.  It did reveal a right sphenoid sinusitis.  CT scan of the cervical spine was negative for acute fracture or dislocation.  Did show multilevel disc and facet degeneration.  Chest x-ray was negative.  Blood glucose mildly elevated 127 BUN of 24.  eGFR of 53.  EKG with normal sinus rhythm without significant findings.    Psychiatry was consulted for her depression and recommended changing citalopram (believed to be contributing to patient's recurrent nausea) for mirtazapine.  Patient often declined to work with physical and occupational therapy and did not have great oral intake.  In fact, patient's admitting weight was 105 pounds which decreased to 90 pounds exhibiting signs of severe protein calorie malnutrition.  Due to her refractory depression, methylphenidate was initiated with patient's consent with significant improvement of her alertness, social engagement, and appetite.  Physical medicine and rehabilitation was consulted, but the patient was not interested in inpatient rehabilitation and wanted to go directly to home when medically cleared.  Recommendation was for home health and outpatient therapies.    Patient had a prolonged hospitalization due to indefinite social support.  According to case management, patient could not qualify for the group home waiver due to her assets.  However, the patient's granddaughter Airam Macias stated that the motor home was not in the patient's name but rather another family member.  [facility] agreed to a letter of agreement for 3 months for a local group home.  However, the accepting group home requested 6 months further delaying patient's discharge.      Per review with the Complex Discharge Planning Committee, recommendation was for the patient to be discharged to her family versus local shelter.  Patient stated that she wanted to return living with her granddaughter  Airam, who continued to express significant concerns about her ability to care for her, especially with other children as well as her work schedule.  The patient stated that she became so depressed because she felt she was a bother to everybody and felt not wanted by anyone.    I personally had multiple discussions with the patient's granddaughter Airam regarding my concerns about the patient's continued failure to thrive and progressing severe protein calorie malnutrition indicating that the patient may be nearing the end of her life.  Patient made multiple comments to multiple providers about wanting to just die although she was not having any suicidal or homicidal ideation necessitating a legal hold.  Palliative care was consulted and the patient initially consented to hospice but then when referrals were trying to be sent, patient declined to be on hospice.  Per my discussion with Airam, she did not want the patient to be discharged to a shelter and agreed to take her to her home.  Home health could not be arranged for the patient because she did not have a establish primary care provider.  However, an appointment with a primary care provider was provided for the patient for 9/5/2023.  Patient was provided with wheelchair as well as 3 and 1 bedside commode.  Case management referred the granddaughter to the [facility] to obtain a medical bed.     In the couple of days preceding patient's discharge and after initiation of methylphenidate, patient displayed significant improvement in engagement with her providers and also food intake indicating that there was a chance that the patient may rebound.  Again, the patient and Airam were informed of the option for hospice if circumstances the patient's health did not continue to improve.    Patient reported that she still had a home in [place], where she wanted to go to continue to live her life.  She stated that she traveled down to the local area in  her motor  home and hoped to return there in in 3 weeks.    Therefore, she is discharged in fair and stable condition to home with close outpatient follow-up.    The patient met 2-midnight criteria for an inpatient stay at the time of discharge under observational status.    Discharge Date  9/2/2023    FOLLOW UP ITEMS POST DISCHARGE  -Establish care with primary care provider on appointment on 9/5/2023 for home health services.  -Follow-up with psychiatry on 9/6/2023.    DISCHARGE DIAGNOSES  Principal Problem (Resolved):    Vasovagal near-syncope (POA: Yes)  Active Problems:    Current severe episode of 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 without prior episode (HCC) (POA: Yes)    Failure to thrive in adult (POA: Yes)    Major depressive disorder (POA: Yes)    Primary hypertension (POA: Yes)    Gastroesophageal reflux disease with esophagitis without hemorrhage (POA: Yes)    Acquired hypothyroidism (POA: Yes)    Prediabetes (POA: Yes)    JEREMIAH (obstructive sleep apnea) (POA: Yes)    Chronic nausea (POA: Yes)      Overview: - Continue Zofran as needed    Paroxysmal atrial fibrillation (HCC) (POA: Yes)    Severe protein-calorie malnutrition (HCC) (POA: Yes)    Slow transit constipation (POA: Yes)    DNR (do not resuscitate) (POA: Yes)  Resolved Problems:    Hypertensive urgency (POA: Yes)    Acute cystitis without hematuria (POA: No)    KIYA (acute kidney injury) (HCC) (POA: Yes)    Hyponatremia (POA: No)    Discharge planning issues (POA: Yes)    Palliative care encounter (POA: Yes)    Advance care planning (POA: Yes)      FOLLOW UP  Future Appointments   Date Time Provider Department Center   9/5/2023  9:20 AM Yesi Carlos P.A.-C. [address]   9/6/2023  2:00 PM Ambika Bangura D.O. [facility]     Ambika Bangura D.O.  [address]  [address]  [place] [address]  [phone]    Go on 9/6/2023  2 pm appointment      MEDICATIONS ON DISCHARGE     Medication List        Start taking  these medications        Instructions   acetaminophen 500 MG Tabs  Commonly known as: Tylenol   Take 2 Tablets by mouth every 6 hours as needed for Mild Pain.  Dose: 1,000 mg     amLODIPine 10 MG Tabs  Commonly known as: Norvasc   Take 1 Tablet by mouth every day.  Dose: 10 mg     methylphenidate 5 MG Tabs  Commonly known as: Ritalin   Take 1 Tablet by mouth every morning with breakfast for 60 days.  Dose: 5 mg     mirtazapine 15 MG Tabs  Commonly known as: Remeron   Take 1 Tablet by mouth at bedtime.  Dose: 15 mg     One-Daily Multi-Vitamin Tabs   Take 1 Tablet by mouth every day.  Dose: 1 Tablet     polyethylene glycol/lytes 17 g Pack  Commonly known as: Miralax   Take 1 Packet by mouth 3 times a day as needed (constipation).  Dose: 17 g     prochlorperazine 5 MG Tabs  Commonly known as: Compazine   Take 1 Tablet by mouth every 6 hours as needed for Nausea/Vomiting (use SECOND if zofran ineffective).  Dose: 5 mg            Continue taking these medications        Instructions   fluticasone 50 MCG/ACT nasal spray  Commonly known as: Flonase   Administer 1 Spray into affected nostril(S) at bedtime.  Dose: 1 Spray     levothyroxine 100 MCG Tabs  Commonly known as: Synthroid   Take 100 mcg by mouth every morning on an empty stomach.  Dose: 100 mcg     MAGNESIUM PO   Take 1 Tablet by mouth every day.  Dose: 1 Tablet     metoprolol tartrate 25 MG Tabs  Commonly known as: Lopressor   Take 25 mg by mouth 2 times a day.  Dose: 25 mg     ondansetron 4 MG Tbdp  Commonly known as: Zofran ODT   Take 4 mg by mouth every 8 hours as needed for Nausea/Vomiting.  Dose: 4 mg     pantoprazole 40 MG Tbec  Commonly known as: Protonix   Take 40 mg by mouth 2 times a day.  Dose: 40 mg     Pradaxa 110 MG Caps  Generic drug: Dabigatran Etexilate Mesylate   Take 110 mg by mouth 2 times a day.  Dose: 110 mg     VITAMIN B 12 PO   Take 1 Tablet by mouth every day.  Dose: 1 Tablet     VITAMIN D3 PO   Take 1 Tablet by mouth every day.  Dose: 1  Tablet            Stop taking these medications      docusate sodium 100 MG Caps  Commonly known as: Colace     losartan 100 MG Tabs  Commonly known as: Cozaar              Allergies  Allergies   Allergen Reactions    Codeine     Pcn [Penicillins]      As a child       DIET  Orders Placed This Encounter   Procedures    Diet Order Diet: Level 6 - Soft and Bite Sized; Liquid level: Level 0 - Thin     Standing Status:   Standing     Number of Occurrences:   1     Order Specific Question:   Diet:     Answer:   Level 6 - Soft and Bite Sized [23]     Order Specific Question:   Liquid level     Answer:   Level 0 - Thin       ACTIVITY  As tolerated.  Weight bearing as tolerated    CONSULTATIONS  Psychiatry  Palliative medicine  Behavioral health for psychotherapy  Physical medicine and rehabilitation    PROCEDURES  None    LABORATORY  Lab Results   Component Value Date    SODIUM 136 08/31/2023    POTASSIUM 4.6 08/31/2023    CHLORIDE 103 08/31/2023    CO2 22 08/31/2023    GLUCOSE 99 08/31/2023    BUN 20 08/31/2023    CREATININE 0.86 08/31/2023        Lab Results   Component Value Date    WBC 5.4 08/31/2023    HEMOGLOBIN 12.4 08/31/2023    HEMATOCRIT 38.3 08/31/2023    PLATELETCT 279 08/31/2023        Total time of the discharge process exceeds 52 minutes.

## 2023-09-02 NOTE — ASSESSMENT & PLAN NOTE
As per psychiatry.  Patient's decreased appetite and anhedonia related to depression.    Continue mirtazapine  Started Ritalin today.  Discussed with psychiatry.

## 2023-09-05 ENCOUNTER — APPOINTMENT (OUTPATIENT)
Dept: MEDICAL GROUP | Facility: MEDICAL CENTER | Age: 83
End: 2023-09-05
Payer: MEDICARE

## 2023-09-05 NOTE — PROGRESS NOTES
Subjective:     CC: ***    HPI:   Felipa presents today with    Hospital follow-up  Patient recently hospitalized 8/15 through 9/2 (18 days) after an episode of syncope and collapse.      ROS:  Gen: no fevers/chills, no changes in weight  Eyes: no changes in vision  ENT: no sore throat, no hearing loss, no bloody nose  Pulm: no sob, no cough  CV: no chest pain, no palpitations  GI: no nausea/vomiting, no diarrhea  : no dysuria  MSk: no myalgias  Skin: no rash  Neuro: no headaches, no numbness/tingling  Heme/Lymph: no easy bruising      Objective:     Exam:  There were no vitals taken for this visit. There is no height or weight on file to calculate BMI.    Gen: Alert and oriented, No apparent distress.  Neck: Neck is supple without lymphadenopathy.  Lungs: Normal effort, CTA bilaterally, no wheezes, rhonchi, or rales  CV: Regular rate and rhythm. No murmurs, rubs, or gallops.  Ext: No clubbing, cyanosis, edema.    A chaperone was offered to the patient during today's exam. {[id number]}    Labs: ***    Assessment & Plan:     82 y.o. female with the following -     There are no diagnoses linked to this encounter.    I spent a total of *** minutes with record review, exam, communication with the patient, communication with other providers, and documentation of this encounter.      No follow-ups on file.    Please note that this dictation was created using voice recognition software. I have made every reasonable attempt to correct obvious errors, but I expect that there are errors of grammar and possibly content that I did not discover before finalizing the note.

## 2023-09-06 ENCOUNTER — TELEMEDICINE (OUTPATIENT)
Dept: BEHAVIORAL HEALTH | Facility: PSYCHIATRIC FACILITY | Age: 83
End: 2023-09-06
Payer: MEDICARE

## 2023-09-06 ENCOUNTER — TELEPHONE (OUTPATIENT)
Dept: HEALTH INFORMATION MANAGEMENT | Facility: OTHER | Age: 83
End: 2023-09-06
Payer: MEDICARE

## 2023-09-06 DIAGNOSIS — F32.1 CURRENT MODERATE EPISODE OF MAJOR DEPRESSIVE DISORDER WITHOUT PRIOR EPISODE (HCC): ICD-10-CM

## 2023-09-06 PROCEDURE — 99213 OFFICE O/P EST LOW 20 MIN: CPT | Mod: 95 | Performed by: STUDENT IN AN ORGANIZED HEALTH CARE EDUCATION/TRAINING PROGRAM

## 2023-09-06 NOTE — PROGRESS NOTES
"9/6/23  [id number]  Felipa Chavez is a 82 y.o.female seen today for an initial encounter for depression, failure to thrive    Accompanied by: Granddaughter  This was a TELEHEALTH visit conducted over a secure zoom link with patient consent. Patient located in [place], provider also located in [place]    Special language or communication needs: no  Responded to any questions about patient rights: no  Reviewed limits of confidentiality:   Confidentiality: The patient was informed that her medical records are confidential except for use by the treatment team in this clinic and others involved in her care.  Records may be shared with outside entities if the patient signs a release of information.  Information may be shared with appropriate authorities without a release of information to report instances of child/elder abuse or if it is determined she is in imminent risk of harm to self or others.       Chief Compliant: \"I've been doing better\"    History of Present Illness:  Ms. Chavez is an 83 yo female who presents today as a hospital followup from the psychiatric consultation service at [facility]. She was discharged 9/2 on remeron 15 mg QHS and methylphenidate 5 mg QD    The patient reports she is doing better. She has been walking with a walker and is getting stronger. She states she has a goal to move to [facility] with her friend. Her friend has told her she has a room available for her. She states she has a life back in [place] (has friends and Gnosticist there). Also does not want to burden her granddaughter and daughter (daughter was visiting from [place]). She is getting along well with her granddaughter. 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  She comments today that another factor contributing to her depressive episodes was the loss of the relationships with her sisters. Sisters stopped helping her when she and her  needed it. Has " "not spoken to them since she left [place]. She does take some solace that she has a good relationship with her daughters    Psychiatric Review of Symptoms:  Mood-\"better\", rates 7/10, 10 being happies  Sleep-Falling asleep well. She does wake up to use the bathroom but does  fall asleep again within 30 min  Energy-better since she has been able to walk more. Not as groggy as in the hospital  Appetite-\"I eat all time.\" Eats four times a day. Eats close to 100%  Psychosis-Denies A/V hallucinations, no Delusions  SI-none, feels life is worth living. Goals to move back to [place]  HI-none  Neurologic: No falls since leaving the hospital, No tremors, No tics, No dyskinesias, denies  dizziness/lightheadedness and no syncopal episodes. Ambulates with walker gait is steady    Per granddaughter she has progressed. Taking it day by day. Eating really well. Taking medications as prescribed. BP has been \"good\" 120-140/40-70. Getting around better. She has not been contacted by PT/OT. She did not make her PCP appt as it was too \"early.\"  Would still want PT and OT  at home particularly to help with showering    Denies side effects from medications inclusing sedation, dizziness, chest pain, palpitation. She has been compliant with both Ritalin and remeron    Past Medical History  Patient Active Problem List   Diagnosis    Primary hypertension    Gastroesophageal reflux disease with esophagitis without hemorrhage    Esophageal stricture    Acquired hypothyroidism    Chronic low back pain    Hyperlipidemia    ARF (acute renal failure) ([facility])    Urge incontinence    Prediabetes    JEREMIAH (obstructive sleep apnea)    Chronic nausea    Paroxysmal atrial fibrillation (HCC)    Current severe episode of 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 without prior episode (HCC)    Severe protein-calorie malnutrition (HCC)    Failure to thrive in adult    Slow transit constipation    DNR (do not resuscitate)    Major depressive " disorder    No new medical problems since discharge from hospital    Allergies   Allergen Reactions    Codeine     Pcn [Penicillins]      As a child       Current Medications (reviewed by provider and verified by patient and granddaughter)    Current Outpatient Medications:     multivitamin Tab, Take 1 Tablet by mouth every day., Disp: 30 Tablet, Rfl: 2    methylphenidate (RITALIN) 5 MG Tab, Take 1 Tablet by mouth every morning with breakfast for 60 days., Disp: 60 Tablet, Rfl: 0    mirtazapine (REMERON) 15 MG Tab, Take 1 Tablet by mouth at bedtime., Disp: 60 Tablet, Rfl: 2    acetaminophen (TYLENOL) 500 MG Tab, Take 2 Tablets by mouth every 6 hours as needed for Mild Pain., Disp: 30 Tablet, Rfl: 0    amLODIPine (NORVASC) 10 MG Tab, Take 1 Tablet by mouth every day., Disp: 30 Tablet, Rfl: 0    polyethylene glycol/lytes (MIRALAX) 17 g Pack, Take 1 Packet by mouth 3 times a day as needed (constipation)., Disp: , Rfl: 3    prochlorperazine (COMPAZINE) 5 MG Tab, Take 1 Tablet by mouth every 6 hours as needed for Nausea/Vomiting (use SECOND if zofran ineffective)., Disp: 30 Tablet, Rfl: 0    ondansetron (ZOFRAN ODT) 4 MG TABLET DISPERSIBLE, Take 4 mg by mouth every 8 hours as needed for Nausea/Vomiting., Disp: , Rfl:     levothyroxine (SYNTHROID) 100 MCG Tab, Take 100 mcg by mouth every morning on an empty stomach., Disp: , Rfl:     metoprolol tartrate (LOPRESSOR) 25 MG Tab, Take 25 mg by mouth 2 times a day., Disp: , Rfl:     pantoprazole (PROTONIX) 40 MG Tablet Delayed Response, Take 40 mg by mouth 2 times a day., Disp: , Rfl:     Dabigatran Etexilate Mesylate (PRADAXA) 110 MG Cap, Take 110 mg by mouth 2 times a day., Disp: , Rfl:     fluticasone (FLONASE) 50 MCG/ACT nasal spray, Administer 1 Spray into affected nostril(S) at bedtime., Disp: , Rfl:     Cholecalciferol (VITAMIN D3 PO), Take 1 Tablet by mouth every day., Disp: , Rfl:     Cyanocobalamin (VITAMIN B 12 PO), Take 1 Tablet by mouth every day., Disp: , Rfl:     " MAGNESIUM PO, Take 1 Tablet by mouth every day., Disp: , Rfl:   **has not taken miralax, compazine and is not using zofran as much (only two times a day)    No changes to psychiatric history or family history to add today     Social History:  Living situation: Living  with granddaughter  Denies tobacco, alcohol or illicit drug use  ADLS  Dressing: independent  Showering: has not showered since returning from hospital, they do have a shower chair, advised she could do sponge/wipe baths until OT comes as patient is nervous about getting into shower without OT  Toileting:  independent, continent  Ambulating: uses a four wheel walker  Feeding: independent, no problems with swallowing    IADLS  Cleaning:needs assistance  Cooking: needs assistance  Finances:all bills are autopay aside from credit card which she handles  Medication management: needs assistance  Driving: has not driven since hospital stay, does not plan on driving in the future        Mental Status Exam  There were no vitals filed for this visit., went over BP log with reza, most values have been WNL aside from one systolic reading of 142.  General: Awake, alert in no acute distress  Patient Appearance: Appropriate, fairly groomed, wearing glasses and jewelry  Behavior: Appropriate Behavior, Calm, Cooperative  Speech.: Spontaneous, Normal rate and rhythm, Answers appropriately, normal  volume  Mood Comments: \"getting better\"  Affect: appears euthymic  Thought Content: Appropriate, No suicidal ideation, No thoughts of self harm,  No homicidal ideation, Contracts for safety, Feels life is worth living  Thought Process: Logical and goal directed, No loosening of associations  Psychosis: No delusions, No hallucinations  Insight: Good insight  Judgment: good judgment  Cognition: Memory appeared intact in interview., Concentration is intact for age  and education and Fully oriented to person, place, time and circumstance.  Language: Is able to repeat " phrases. and Is able to name objects.  Fund of Knowledge: AS expected for age and level of education  Neuro: no tics, tremors, dyskinesias. no dystonias. Gait not observered    Geriatric Depression Scale (short form)  Are you basically satisfied with your life? No  2.  Have you dropped many of your interests/activities?-yes  3. Do you feel your life is empty?-yes  4. Do you often get bored?-yes  5. Are you in good spirits most of the time?-yes  6. Are you afraid that something bad is about to happen to you?-no  7. Do you feel happy most of the time?-no  8. Do you often feel helpless?-yes  9. Do you prefer to stay at home rather than going out and doing things?-yes  10. Do you feel that you have more problems with memory than most?-no  11. Do you think that it is wonderful to be alive now?-no  12. Do you feel worthless the way you are now?-no  13. Do you feel full of energy?-no  14. Do you feel that your situation is hopeless?-no  15. Do you think that most people are better off than you are?  Total: 9  Total score >5 suggests depression      Labs/Imaging (reviewed most recent labs and imaging, None since D/C from hospital on 9/2      Assessment/Plan  1. Current moderate episode of major depressive disorder without prior episode (HCC)    -Mood is improving and patient has more hope that she can get stronger and eventually return to [place] where she feels he has more friends and support. She has been eating better and energy level has been improving. Denies problems sleeping. Denies SI and now feels life if worth living.    Plan  -continue remeron 15 mg QHS for mood, sleep energy and apetite  -continue methylphenidate 5 mg QD for adjunctive treatment of depression. May stop this medication at next encounter if patient endorses continued improved mood    Attempted to send refills but granddaughter states they still need to establish with a local pharmacy and they still have about 15 days of medications left since  hospital discharge. She will contact the clinic if she is in need of refills    Risk Assessment:      SAFETY ASSESSMENT - RISK TO SELF  Current suicide attempts or self harm: No  Past suicide attempts or self harm: No  History of suicide by family member: No  History of suicide by friend/significant other: No  Recent change in amount/specificity/intensity of suicidal thoughts or self-harm behavior: No  Ongoing substance use disorder: No  Current access to firearms, medications, or other identified means of suicide/self-harm: No  If yes, willing to restrict access to means of suicide/self-harm: N/A  Protective factors present: Yes     SAFETY ASSESSMENT - RISK TO OTHERS  Current aggressive behavior or risk to others: No  Past aggressive behavior or risk to others: No  Recent change in amount/specificity/intensity of thoughts or threats to harm others? No  Current access to firearms/other identified means of harm? No  If yes, willing to restrict access to weapons/means of harm? N/A     CURRENT RISK ASSESSMENT       Suicide: Low       Homicide: Not applicable       Self-Harm: Not applicable       Relapse: Not applicable       Crisis Safety Plan Reviewed Not Indicated    The following treatment plan is designed to minimize the modifiable risk factors  for the patient.      RTC in 1  month sooner if needed    The patient demonstrates capacity to make informed consent.  The patient has the ability and capacity to respond to the treatment plan.  extensive verbal instruction provided    The patient is aware that they can call 911 or this clinic  at any time pt is in crisis and they feel that they may harm themselves or others.  They can also contact our clinic with any further questions/concerns about their  medications    Time Documentation   Encounter With: Patient, granddaughter  Topics Discussed: Prognosis, Risks and benefits of treatment options, Instructions  for management, Patient and family education, Importance of  treatment  compliance, Risk factor reductions, Impressions  Total Time of Encounter: 30 minute(s)  Time Spent Counseling or in Therapy: 15 minute(s)

## 2023-09-12 ENCOUNTER — APPOINTMENT (OUTPATIENT)
Dept: MEDICAL GROUP | Facility: MEDICAL CENTER | Age: 83
End: 2023-09-12
Payer: MEDICARE

## 2023-09-12 NOTE — PROGRESS NOTES
Subjective:     CC: No chief complaint on file.      HISTORY OF THE PRESENT ILLNESS: Felipa is a 82 y.o. female here to establish care and discuss:    ***    Previous PCP  Allergies:   Allergies   Allergen Reactions    Codeine     Pcn [Penicillins]      As a child     Medications:   Current Outpatient Medications:     multivitamin Tab, Take 1 Tablet by mouth every day., Disp: 30 Tablet, Rfl: 2    methylphenidate (RITALIN) 5 MG Tab, Take 1 Tablet by mouth every morning with breakfast for 60 days., Disp: 60 Tablet, Rfl: 0    mirtazapine (REMERON) 15 MG Tab, Take 1 Tablet by mouth at bedtime., Disp: 60 Tablet, Rfl: 2    acetaminophen (TYLENOL) 500 MG Tab, Take 2 Tablets by mouth every 6 hours as needed for Mild Pain., Disp: 30 Tablet, Rfl: 0    amLODIPine (NORVASC) 10 MG Tab, Take 1 Tablet by mouth every day., Disp: 30 Tablet, Rfl: 0    polyethylene glycol/lytes (MIRALAX) 17 g Pack, Take 1 Packet by mouth 3 times a day as needed (constipation)., Disp: , Rfl: 3    prochlorperazine (COMPAZINE) 5 MG Tab, Take 1 Tablet by mouth every 6 hours as needed for Nausea/Vomiting (use SECOND if zofran ineffective)., Disp: 30 Tablet, Rfl: 0    ondansetron (ZOFRAN ODT) 4 MG TABLET DISPERSIBLE, Take 4 mg by mouth every 8 hours as needed for Nausea/Vomiting., Disp: , Rfl:     levothyroxine (SYNTHROID) 100 MCG Tab, Take 100 mcg by mouth every morning on an empty stomach., Disp: , Rfl:     metoprolol tartrate (LOPRESSOR) 25 MG Tab, Take 25 mg by mouth 2 times a day., Disp: , Rfl:     pantoprazole (PROTONIX) 40 MG Tablet Delayed Response, Take 40 mg by mouth 2 times a day., Disp: , Rfl:     Dabigatran Etexilate Mesylate (PRADAXA) 110 MG Cap, Take 110 mg by mouth 2 times a day., Disp: , Rfl:     fluticasone (FLONASE) 50 MCG/ACT nasal spray, Administer 1 Spray into affected nostril(S) at bedtime., Disp: , Rfl:     Cholecalciferol (VITAMIN D3 PO), Take 1 Tablet by mouth every day., Disp: , Rfl:     Cyanocobalamin (VITAMIN B 12 PO), Take 1  Tablet by mouth every day., Disp: , Rfl:     MAGNESIUM PO, Take 1 Tablet by mouth every day., Disp: , Rfl:     ROS:   ROS ***      Objective:     Exam:   There were no vitals taken for this visit. There is no height or weight on file to calculate BMI.    Physical Exam      Assessment & Plan:   82 y.o. female with the following -    There are no diagnoses linked to this encounter.    Referral for genetic research was offered. Patient {declined/accepted}.    Anticipatory guidance  Patient counseled about skin care, diet, supplements, prenatal vitamins, safe sex and exercise, smoking, drugs/alcohol use, and wearing a seat belt.       No follow-ups on file.    Please note that this dictation was created using voice recognition software. I have made every reasonable attempt to correct obvious errors, but I expect that there are errors of grammar and possibly content that I did not discover before finalizing the note.